# Patient Record
Sex: MALE | Race: ASIAN | NOT HISPANIC OR LATINO | Employment: FULL TIME | URBAN - METROPOLITAN AREA
[De-identification: names, ages, dates, MRNs, and addresses within clinical notes are randomized per-mention and may not be internally consistent; named-entity substitution may affect disease eponyms.]

---

## 2018-07-16 ENCOUNTER — CARE COORDINATION (OUTPATIENT)
Dept: FAMILY MEDICINE | Facility: CLINIC | Age: 33
End: 2018-07-16

## 2018-07-16 NOTE — PROGRESS NOTES
Date of discharge: 7/13/2018  Facility of discharge: [facility]  Patient concerns about condition: Concerns include that if he stands too long his back becomes increasingly painful.  Patient concerns about medications: No concerns at this time.  Full med reconciliation will be completed at clinic visit.  Patient concerns about transitioning: No concerns at this time.  Clinic office visit appointment date: 7/25/2018  Dr Tao  Patient reminded to bring all medications (prescription and over-the-counter) to clinic appointment: Yes   Pharmacy added to chart    Using the date of discharge as day 1, the 30th day post discharge is 8/12/2018.

## 2018-07-17 ENCOUNTER — COMMUNICATION - HEALTHEAST (OUTPATIENT)
Dept: FAMILY MEDICINE | Facility: CLINIC | Age: 33
End: 2018-07-17

## 2018-07-19 ENCOUNTER — OFFICE VISIT - HEALTHEAST (OUTPATIENT)
Dept: UROLOGY | Facility: CLINIC | Age: 33
End: 2018-07-19

## 2018-07-19 ENCOUNTER — AMBULATORY - HEALTHEAST (OUTPATIENT)
Dept: LAB | Facility: CLINIC | Age: 33
End: 2018-07-19

## 2018-07-19 DIAGNOSIS — N20.1 CALCULUS OF URETER: ICD-10-CM

## 2018-07-19 DIAGNOSIS — N20.9 URINARY TRACT STONES: ICD-10-CM

## 2018-07-19 DIAGNOSIS — N20.9 URINARY CALCULUS: ICD-10-CM

## 2018-07-19 LAB
ALBUMIN SERPL-MCNC: 4.1 G/DL (ref 3.5–5)
ALBUMIN UR-MCNC: NEGATIVE MG/DL
ANION GAP SERPL CALCULATED.3IONS-SCNC: 6 MMOL/L (ref 5–18)
APPEARANCE UR: CLEAR
BILIRUB UR QL STRIP: NEGATIVE
BUN SERPL-MCNC: 14 MG/DL (ref 8–22)
CALCIUM SERPL-MCNC: 9.5 MG/DL (ref 8.5–10.5)
CALCIUM SERPL-MCNC: 9.5 MG/DL (ref 8.5–10.5)
CALCIUM, IONIZED MEASURED: 1.19 MMOL/L (ref 1.11–1.3)
CHLORIDE BLD-SCNC: 107 MMOL/L (ref 98–107)
CO2 SERPL-SCNC: 28 MMOL/L (ref 22–31)
COLOR UR AUTO: YELLOW
CREAT SERPL-MCNC: 1.17 MG/DL (ref 0.7–1.3)
CREAT SERPL-MCNC: 1.21 MG/DL (ref 0.7–1.3)
GFR SERPL CREATININE-BSD FRML MDRD: >60 ML/MIN/1.73M2
GFR SERPL CREATININE-BSD FRML MDRD: >60 ML/MIN/1.73M2
GLUCOSE BLD-MCNC: 83 MG/DL (ref 70–125)
GLUCOSE UR STRIP-MCNC: ABNORMAL MG/DL
HGB UR QL STRIP: NEGATIVE
ION CA PH 7.4: 1.14 MMOL/L (ref 1.11–1.3)
KETONES UR STRIP-MCNC: NEGATIVE MG/DL
LEUKOCYTE ESTERASE UR QL STRIP: NEGATIVE
MAGNESIUM SERPL-MCNC: 2.7 MG/DL (ref 1.8–2.6)
NITRATE UR QL: NEGATIVE
PH UR STRIP: 7 [PH] (ref 5–8)
PH: 7.3 (ref 7.35–7.45)
PHOSPHATE SERPL-MCNC: 3.1 MG/DL (ref 2.5–4.5)
PHOSPHATE SERPL-MCNC: 3.2 MG/DL (ref 2.5–4.5)
POTASSIUM BLD-SCNC: 4.7 MMOL/L (ref 3.5–5)
PTH-INTACT SERPL-MCNC: 55 PG/ML (ref 10–86)
SODIUM SERPL-SCNC: 141 MMOL/L (ref 136–145)
SP GR UR STRIP: 1.01 (ref 1–1.03)
URATE SERPL-MCNC: 7.2 MG/DL (ref 3–8)
UROBILINOGEN UR STRIP-ACNC: ABNORMAL

## 2018-07-21 ENCOUNTER — AMBULATORY - HEALTHEAST (OUTPATIENT)
Dept: LAB | Facility: HOSPITAL | Age: 33
End: 2018-07-21

## 2018-07-21 DIAGNOSIS — N20.1 CALCULUS OF URETER: ICD-10-CM

## 2018-07-25 ENCOUNTER — OFFICE VISIT (OUTPATIENT)
Dept: FAMILY MEDICINE | Facility: CLINIC | Age: 33
End: 2018-07-25
Payer: COMMERCIAL

## 2018-07-25 VITALS
HEART RATE: 96 BPM | TEMPERATURE: 98.1 F | SYSTOLIC BLOOD PRESSURE: 126 MMHG | WEIGHT: 213 LBS | OXYGEN SATURATION: 97 % | HEIGHT: 69 IN | BODY MASS INDEX: 31.55 KG/M2 | DIASTOLIC BLOOD PRESSURE: 86 MMHG | RESPIRATION RATE: 16 BRPM

## 2018-07-25 DIAGNOSIS — N13.2 HYDRONEPHROSIS WITH URINARY OBSTRUCTION DUE TO URETERAL CALCULUS: ICD-10-CM

## 2018-07-25 DIAGNOSIS — N20.1 URETEROLITHIASIS: Primary | ICD-10-CM

## 2018-07-25 NOTE — MR AVS SNAPSHOT
After Visit Summary   2018    Deonna Honeycutt    MRN: [id number]           Patient Information     Date Of Birth          1985        Visit Information        Provider Department      2018 3:00 PM Kranthi Kirby MD [facility]        Today's Diagnoses     Ureterolithiasis    -  1    Hydronephrosis with urinary obstruction due to ureteral calculus           Follow-ups after your visit        Follow-up notes from your care team     Return in about 1 month (around 2018).      Your next 10 appointments already scheduled     Aug 24, 2018  2:20 PM CDT   PHYSICAL with Kranthi Kirby MD   [facility] ([facility])    [address] [place] [id number]   [phone]              Who to contact     Please call your clinic at [phone] to:    Ask questions about your health    Make or cancel appointments    Discuss your medicines    Learn about your test results    Speak to your doctor            Additional Information About Your Visit        MyChart Information     CloudArena is an electronic gateway that provides easy, online access to your medical records. With CloudArena, you can request a clinic appointment, read your test results, renew a prescription or communicate with your care team.     To sign up for Instamo[employer] visit the website at [email or web address]   You will be asked to enter the access code listed below, as well as some personal information. Please follow the directions to create your username and password.     Your access code is: [email or web address]  Expires: 10/29/2018  8:10 AM     Your access code will  in 90 days. If you need help or a new code, please contact your [facility] or call [phone] for assistance.        Care EveryWhere ID     This is your Care EveryWhere ID. This could be used by other organizations to access your [facility] medical records  [id number]        Your Vitals Were     Pulse  "Temperature Respirations Height Pulse Oximetry BMI (Body Mass Index)    96 98.1  F (36.7  C) (Oral) 16 5' 8.7\" (174.5 cm) 97% 31.73 kg/m2       Blood Pressure from Last 3 Encounters:   07/25/18 126/86    Weight from Last 3 Encounters:   07/25/18 213 lb (96.6 kg)              We Performed the Following      : Sign Language or Oral - 38-52 minutes        Primary Care Provider Office Phone #    Kranthi Kirby [phone]       [facility] [address] [place] [address]        Equal Access to Services     [facility]: Hadii aad ku hadasho Soomaali, waaxda luqadaha, qaybta kaalmada adeegyada, waxay idiin hayaan adeeg kharaberna laboyd . So [facility] [phone].    ATENCIÓN: Si habla español, tiene a starks disposición servicios gratuitos de asistencia lingüística. Llame al [phone].    We comply with applicable federal civil rights laws and [place] laws. We do not discriminate on the basis of race, color, national origin, age, disability, sex, sexual orientation, or gender identity.            Thank you!     Thank you for choosing [facility]  for your care. Our goal is always to provide you with excellent care. Hearing back from our patients is one way we can continue to improve our services. Please take a few minutes to complete the written survey that you may receive in the mail after your visit with us. Thank you!             Your Updated Medication List - Protect others around you: Learn how to safely use, store and throw away your medicines at www.disposemymeds.org.      Notice  As of 7/25/2018 11:59 PM    You have not been prescribed any medications.      "

## 2018-07-25 NOTE — NURSING NOTE
Due to patient being non-English speaking/uses sign language, an  was used for this visit. Only for face-to-face interpretation by an external agency, date and length of interpretation can be found on the scanned worksheet.     name: Hstyra  Agency: [employer]  Language: Bell   Telephone number: [phone]  Type of interpretation: Face-to-face, spoken

## 2018-07-25 NOTE — LETTER
RETURN TO WORK/SCHOOL FORM    7/25/2018    Re: Deonna Honeycutt  1985      To Whom It May Concern:     Deonna Honeycutt was seen in clinic today..  He may return to work without restrictions on 7/26/18          Restrictions:  None      Kranthi Kirby MD  7/25/2018 3:45 PM

## 2018-07-25 NOTE — PROGRESS NOTES
"      Hospitalization Follow-up Visit         Lists of hospitals in the [place]       Hospital Follow-up Visit:    Hospital:  [facility]   Date of Admission: 7/12/18  Date of Discharge: 7/13/18  Reason(s) for Admission: Nephrolithiasis, hydroureter            Problems taking medications regularly:  None       Post Discharge Medication Reconciliation: discharge medications reconciled and changed, per note/orders (see AVS).       Problems adhering to non-medication therapy:  None       Medications reviewed by: by myself. Findings include Still taking tamsulosin, instructed to stop because not having any pain.    Summary of hospitalization:  [facility] discharge summary reviewed  Diagnostic Tests/Treatments reviewed.  Follow up needed: none  Other Healthcare Providers Involved in Patient s Care:[facility]  Update since discharge: improved. No more pain   Plan of care communicated with patient            A [employer]  was used for this visit.            Review of Systems:   CONSTITUTIONAL: no fatigue, no unexpected change in weight  SKIN: no worrisome rashes, no worrisome moles, no worrisome lesions  EYES: no acute vision problems or changes  ENT: no ear problems, no mouth problems, no throat problems  RESP: no significant cough, no shortness of breath  CV: no chest pain, no palpitations, no new or worsening peripheral edema  GI: no nausea, no vomiting, no constipation, no diarrhea            Physical Exam:     Vitals:    07/25/18 1506   BP: 126/86   Pulse: 96   Resp: 16   Temp: 98.1  F (36.7  C)   TempSrc: Oral   SpO2: 97%   Weight: 213 lb (96.6 kg)   Height: 5' 8.7\" (174.5 cm)     Body mass index is 31.73 kg/(m^2).    GENERAL: healthy, alert, well nourished, well hydrated, no distress  HENT: ear canals- normal; TMs- normal; Nose- normal; Mouth- no ulcers, no lesions  NECK: no tenderness, no adenopathy, no asymmetry, no masses, no stiffness; thyroid- normal to palpation  RESP: lungs clear to auscultation - no rales, no rhonchi, " no wheezes  CV: regular rates and rhythm, normal S1 S2, no S3 or S4 and no murmur, no click or rub -  ABDOMEN: soft, no tenderness, no  hepatosplenomegaly, no masses, normal bowel sounds         Results:   Results from the last 24 hours No results found for this or any previous visit (from the past 24 hour(s)).    Assessment and Plan     (N20.1) Ureterolithiasis  (primary encounter diagnosis)  Comment: I believe the stone has passed based on patient's symptoms. Unable to find stone despite straining. But straining likely inconsistent. Patient has FU scheduled with [facility]. In the meantime DC flomax. Patient has PCP and has not had a physical so scheduled for 1 month.  Plan:  : Sign Language or Oral - 38-52         minutes    (N13.2) Hydronephrosis with urinary obstruction due to ureteral calculus  Comment: Mild.  Plan:  Repeat imaging per urologist    FU for yearly physical in 1 month    E&M code to be billed if TCM cannot be: 03318    Type of decision making: Moderate complexity (75735)    Options for treatment and follow-up care were reviewed with the patient  Lay D Dah   engaged in the decision making process and verbalized understanding of the options discussed and agreed with the final plan.      Kranthi Kirby MD

## 2018-07-25 NOTE — PROGRESS NOTES
Preceptor Attestation:   Patient seen, evaluated and discussed with the resident. I have verified the content of the note, which accurately reflects my assessment of the patient and the plan of care.  Supervising Physician:Augie Gallardo MD  [facility]

## 2018-07-26 LAB
CALCIUM 24H UR-MRATE: 119 MG/24HR (ref 25–300)
CHLORIDE 24H UR-SRATE: 193 MMOL/24HR (ref 110–250)
CITRATE 24H UR-MCNC: 284 MG/24HR (ref 221–1191)
CREATININE, 24 HR URINE - HISTORICAL: 1613.7 MG/24HR (ref 1000–2000)
MAGNESIUM 24H UR-MRATE: ABNORMAL MG/24 HR (ref 75–150)
OXALATE MG/SPEC: 48.3 MG/24HR (ref 7–44)
PH UR STRIP: 6 [PH] (ref 4.5–8)
PHOSPHORUS URINE MG/SPEC: 443 MG/24HR (ref 400–1300)
POTASSIUM 24H UR-SCNC: 41 MMOL/24HR (ref 30–90)
SODIUM 24H UR-SRATE: 176 MMOL/24HR (ref 40–217)
SPECIMEN VOL UR: 2475 ML
URIC ACID URINE MG/SPEC: 691 MG/24HR (ref 250–750)

## 2018-07-28 ENCOUNTER — AMBULATORY - HEALTHEAST (OUTPATIENT)
Dept: LAB | Facility: HOSPITAL | Age: 33
End: 2018-07-28

## 2018-07-28 DIAGNOSIS — N20.1 CALCULUS OF URETER: ICD-10-CM

## 2018-07-31 LAB
CALCIUM 24H UR-MRATE: 120 MG/24HR (ref 25–300)
CHLORIDE 24H UR-SRATE: 179 MMOL/24HR (ref 110–250)
CITRATE 24H UR-MCNC: 374 MG/24HR (ref 221–1191)
CREATININE, 24 HR URINE - HISTORICAL: 2290.6 MG/24HR (ref 1000–2000)
MAGNESIUM 24H UR-MRATE: 55 MG/24 HR (ref 75–150)
OXALATE MG/SPEC: 77 MG/24HR (ref 7–44)
PH UR STRIP: 6 [PH] (ref 4.5–8)
PHOSPHORUS URINE MG/SPEC: 1024.4 MG/24HR (ref 400–1300)
POTASSIUM 24H UR-SCNC: 59 MMOL/24HR (ref 30–90)
SODIUM 24H UR-SRATE: 161 MMOL/24HR (ref 40–217)
SPECIMEN VOL UR: 2600 ML
URIC ACID URINE MG/SPEC: 918 MG/24HR (ref 250–750)

## 2018-08-01 ENCOUNTER — TELEPHONE (OUTPATIENT)
Dept: FAMILY MEDICINE | Facility: CLINIC | Age: 33
End: 2018-08-01

## 2018-08-01 NOTE — TELEPHONE ENCOUNTER
TCM APPOINTMENT FOLLOW UP    Language:Other: Bell    Medication questions: yes:  Explain: Unable to speak with him, message left through language line    Specialist follow up: no    Concerns since last visit: no    Next appointment at [facility]:No    Comments: Left messaged educating and to call 24 hr line if needed    @[facility]@ yes   Amada Avendano

## 2018-08-09 ENCOUNTER — OFFICE VISIT - HEALTHEAST (OUTPATIENT)
Dept: UROLOGY | Facility: CLINIC | Age: 33
End: 2018-08-09

## 2018-08-09 DIAGNOSIS — N20.9 URINARY TRACT STONES: ICD-10-CM

## 2018-08-09 DIAGNOSIS — R82.992 HYPEROXALURIA: ICD-10-CM

## 2018-08-09 LAB
ALBUMIN UR-MCNC: NEGATIVE MG/DL
APPEARANCE UR: CLEAR
BILIRUB UR QL STRIP: NEGATIVE
COLOR UR AUTO: YELLOW
GLUCOSE UR STRIP-MCNC: NEGATIVE MG/DL
HGB UR QL STRIP: NEGATIVE
KETONES UR STRIP-MCNC: NEGATIVE MG/DL
LEUKOCYTE ESTERASE UR QL STRIP: NEGATIVE
NITRATE UR QL: NEGATIVE
PH UR STRIP: 5.5 [PH] (ref 5–8)
SP GR UR STRIP: 1.02 (ref 1–1.03)
UROBILINOGEN UR STRIP-ACNC: NORMAL

## 2018-08-14 ENCOUNTER — HOSPITAL ENCOUNTER (OUTPATIENT)
Dept: CT IMAGING | Facility: CLINIC | Age: 33
Discharge: HOME | End: 2018-08-14
Attending: PHYSICIAN ASSISTANT

## 2018-08-14 ENCOUNTER — OFFICE VISIT - HEALTHEAST (OUTPATIENT)
Dept: UROLOGY | Facility: CLINIC | Age: 33
End: 2018-08-14

## 2018-08-14 ENCOUNTER — AMBULATORY - HEALTHEAST (OUTPATIENT)
Dept: UROLOGY | Facility: CLINIC | Age: 33
End: 2018-08-14

## 2018-08-14 DIAGNOSIS — R10.9 ACUTE LEFT FLANK PAIN: ICD-10-CM

## 2018-08-14 DIAGNOSIS — R10.9 LEFT FLANK PAIN: ICD-10-CM

## 2018-08-14 DIAGNOSIS — N20.9 URINARY TRACT STONES: ICD-10-CM

## 2018-08-14 DIAGNOSIS — N20.1 CALCULUS OF URETER: ICD-10-CM

## 2018-08-14 LAB
ALBUMIN UR-MCNC: NEGATIVE MG/DL
APPEARANCE UR: CLEAR
BILIRUB UR QL STRIP: NEGATIVE
COLOR UR AUTO: YELLOW
GLUCOSE UR STRIP-MCNC: NEGATIVE MG/DL
HGB UR QL STRIP: NEGATIVE
KETONES UR STRIP-MCNC: NEGATIVE MG/DL
LEUKOCYTE ESTERASE UR QL STRIP: NEGATIVE
NITRATE UR QL: NEGATIVE
PH UR STRIP: 6 [PH] (ref 5–8)
SP GR UR STRIP: 1.01 (ref 1–1.03)
UROBILINOGEN UR STRIP-ACNC: NORMAL

## 2018-08-24 ENCOUNTER — OFFICE VISIT (OUTPATIENT)
Dept: FAMILY MEDICINE | Facility: CLINIC | Age: 33
End: 2018-08-24
Payer: COMMERCIAL

## 2018-08-24 VITALS
HEIGHT: 69 IN | OXYGEN SATURATION: 96 % | TEMPERATURE: 97.8 F | WEIGHT: 215 LBS | SYSTOLIC BLOOD PRESSURE: 124 MMHG | RESPIRATION RATE: 16 BRPM | DIASTOLIC BLOOD PRESSURE: 80 MMHG | BODY MASS INDEX: 31.84 KG/M2 | HEART RATE: 76 BPM

## 2018-08-24 DIAGNOSIS — Z00.00 ROUTINE GENERAL MEDICAL EXAMINATION AT A HEALTH CARE FACILITY: Primary | ICD-10-CM

## 2018-08-24 LAB — HIV 1+2 AB+HIV1 P24 AG SERPL QL IA: NEGATIVE

## 2018-08-24 NOTE — LETTER
August 27, 2018      Deonna Honeycutt  [address]  [place] [address]        Dear Deonna,    Your results from your recent clinic visit show that your HIV test is negative.     Please see below for your test results.    Resulted Orders   HIV Ag/Ab Screen [place] ([facility])   Result Value Ref Range    HIV Antigen/Antibody Negative Negative    Narrative    Test performed by:  [facility]  [address] [place] [address]  Method is Abbott HIV Ag/Ab for the detection of HIV p24 antigen, HIV-1   antibodies and HIV-2 antibodies.       If you have any questions, please call the clinic to make an appointment.    Sincerely,    Kranthi Kirby MD

## 2018-08-24 NOTE — MR AVS SNAPSHOT
After Visit Summary   8/24/2018    Deonna Honeycutt    MRN: [id number]           Patient Information     Date Of Birth          1985        Visit Information        Provider Department      8/24/2018 2:20 PM Kranthi Kirby MD [facility]        Today's Diagnoses     Routine general medical examination at a health care facility    -  1      Care Instructions      Preventive Health Recommendations  Male Ages 26 - 39    Yearly exam:             See your health care provider every year in order to  o   Review health changes.   o   Discuss preventive care.    o   Review your medicines if your doctor has prescribed any.    You should be tested each year for STDs (sexually transmitted diseases), if you re at risk.     After age 35, talk to your provider about cholesterol testing. If you are at risk for heart disease, have your cholesterol tested at least every 5 years.     If you are at risk for diabetes, you should have a diabetes test (fasting glucose).  Shots: Get a flu shot each year. Get a tetanus shot every 10 years.     Nutrition:    Eat at least 5 servings of fruits and vegetables daily.     Eat whole-grain bread, whole-wheat pasta and brown rice instead of white grains and rice.     Get adequate Calcium and Vitamin D.     Lifestyle    Exercise for at least 150 minutes a week (30 minutes a day, 5 days a week). This will help you control your weight and prevent disease.     Limit alcohol to one drink per day.     No smoking.     Wear sunscreen to prevent skin cancer.     See your dentist every six months for an exam and cleaning.             Follow-ups after your visit        Follow-up notes from your care team     Return in about 1 year (around 8/24/2019).      Who to contact     Please call your clinic at [phone] to:    Ask questions about your health    Make or cancel appointments    Discuss your medicines    Learn about your test results    Speak to your doctor            Additional  "Information About Your Visit        Servo Software Information     [employer] is an electronic gateway that provides easy, online access to your medical records. With [employer], you can request a clinic appointment, read your test results, renew a prescription or communicate with your care team.     To sign up for [employer] Software visit the website at [email or web address]   You will be asked to enter the access code listed below, as well as some personal information. Please follow the directions to create your username and password.     Your access code is: [email or web address]  Expires: 10/29/2018  8:10 AM     Your access code will  in 90 days. If you need help or a new code, please contact your [facility] or call [phone] for assistance.        Care EveryWhere ID     This is your Care EveryWhere ID. This could be used by other organizations to access your [facility] medical records  [id number]        Your Vitals Were     Pulse Temperature Respirations Height Pulse Oximetry BMI (Body Mass Index)    76 97.8  F (36.6  C) (Oral) 16 5' 9\" (175.3 cm) 96% 31.75 kg/m2       Blood Pressure from Last 3 Encounters:   18 124/80   18 126/86    Weight from Last 3 Encounters:   18 215 lb (97.5 kg)   18 213 lb (96.6 kg)              We Performed the Following     HIV Ag/Ab Screen [place] ([facility])      : Sign Language or Oral - 23-37 minutes       Information about OPIOIDS     PRESCRIPTION OPIOIDS: WHAT YOU NEED TO KNOW   We gave you an opioid (narcotic) pain medicine. It is important to manage your pain, but opioids are not always the best choice. You should first try all the other options your care team gave you. Take this medicine for as short a time (and as few doses) as possible.    Some activities can increase your pain, such as bandage changes or therapy sessions. It may help to take your pain medicine 30 to 60 minutes before these activities. Reduce your stress by " getting enough sleep, working on hobbies you enjoy and practicing relaxation or meditation. Talk to your care team about ways to manage your pain beyond prescription opioids.    These medicines have risks:    DO NOT drive when on new or higher doses of pain medicine. These medicines can affect your alertness and reaction times, and you could be arrested for driving under the influence (DUI). If you need to use opioids long-term, talk to your care team about driving.    DO NOT operate heavy machinery    DO NOT do any other dangerous activities while taking these medicines.    DO NOT drink any alcohol while taking these medicines.     If the opioid prescribed includes acetaminophen, DO NOT take with any other medicines that contain acetaminophen. Read all labels carefully. Look for the word  acetaminophen  or  Tylenol.  Ask your pharmacist if you have questions or are unsure.    You can get addicted to pain medicines, especially if you have a history of addiction (chemical, alcohol or substance dependence). Talk to your care team about ways to reduce this risk.    All opioids tend to cause constipation. Drink plenty of water and eat foods that have a lot of fiber, such as fruits, vegetables, prune juice, apple juice and high-fiber cereal. Take a laxative (Miralax, milk of magnesia, Colace, Senna) if you don t move your bowels at least every other day. Other side effects include upset stomach, sleepiness, dizziness, throwing up, tolerance (needing more of the medicine to have the same effect), physical dependence and slowed breathing.    Store your pills in a secure place, locked if possible. We will not replace any lost or stolen medicine. If you don t finish your medicine, please throw away (dispose) as directed by your pharmacist. The [place] Pollution Control Agency has more information about safe disposal: [email or web address]/living-[email or web address]-unwanted-medications         Primary Care Provider Office  Phone #    Kranthi Kirby [phone]       [facility] [address] [place] [address]        Equal Access to Services     GLENN ANDRE : Hadii gregory pacheco jhonyo Somyronali, waaxda luqadaha, qaybta kaalmada adelibia, linden betancourt laolenatoney wyatt. So [facility] [phone].    ATENCIÓN: Si habla español, tiene a starks disposición servicios gratuitos de asistencia lingüística. Llame al [phone].    We comply with applicable federal civil rights laws and [place] laws. We do not discriminate on the basis of race, color, national origin, age, disability, sex, sexual orientation, or gender identity.            Thank you!     Thank you for choosing [facility]  for your care. Our goal is always to provide you with excellent care. Hearing back from our patients is one way we can continue to improve our services. Please take a few minutes to complete the written survey that you may receive in the mail after your visit with us. Thank you!             Your Updated Medication List - Protect others around you: Learn how to safely use, store and throw away your medicines at www.disposemymeds.org.          This list is accurate as of 8/24/18 11:59 PM.  Always use your most recent med list.                   Brand Name Dispense Instructions for use Diagnosis    OXYCODONE HCL PO      Take 5 mg by mouth every 4 hours as needed    Routine general medical examination at a health care facility       TAMSULOSIN HCL PO      Take 0.4 mg by mouth One capsule po for 14 days    Routine general medical examination at a health care facility

## 2018-08-24 NOTE — NURSING NOTE
Due to patient being non-English speaking/uses sign language, an  was used for this visit. Only for face-to-face interpretation by an external agency, date and length of interpretation can be found on the scanned worksheet.     name: Yelitza Lancaster  Agency: [employer]  Language: Bell   Telephone number: [phone]  Type of interpretation: Face-to-face, spoken

## 2018-08-24 NOTE — PROGRESS NOTES
Male Physical Note      Concerns today:  - Last seen 7/25/18 for Follow-up from ER visit for nephrolithiasis, no longer was symptomatic so felt likely that kidney stone had passes  - Saw nephrology 8/14 afer having one day of left flak pain similar to previous and CT scan showed persistent 3 mm UVJ calculus with resolution of previous hydronephrosis.  - Plan was to pursue 3-4 weeks of medical expulsive therapy and if stone persistent that proceed with surgical intervention.   - Has an appointment with nephrology on on 9/20    A [employer]  was used for this visit.    ROS:                      CONSTITUTIONAL: no fatigue, no unexpected change in weight  SKIN: no worrisome rashes, no worrisome moles, no worrisome lesions  EYES: no acute vision problems or changes  ENT: no ear problems, no mouth problems, no throat problems  RESP: no significant cough, no shortness of breath  CV: no chest pain, no palpitations, no new or worsening peripheral edema  GI: no nausea, no vomiting, no constipation, no diarrhea, Some inguinal pain    Past Medical History:   Diagnosis Date     Ureterolithiasis 7/12/2018        Family History   Problem Relation Age of Onset     Nephrolithiasis Mother      Reviewed no other significant FH           Family History and past Medical History reviewed and unchanged/updated.    Social History   Substance Use Topics     Smoking status: Never Smoker     Smokeless tobacco: Never Used     Alcohol use No       Children- 2    Has anyone hurt you physically, for example by pushing, hitting, slapping or kicking you or forcing you to have sex? Denies  Do you feel threatened or controlled by a partner, ex-partner or anyone in your life? Denies    RISK BEHAVIORS AND HEALTHY HABITS:  Tobacco Use/Smoking: No  Illicit Drug Use: None  ETOH: None  Do you use alcohol? No  Sexually Active: Yes  Diet (5-7 servings of fruits/veg daily): 1-2  Exercise (30 min accumulated most days):No  Dental Care: Yes  "  Calcium 1500 mg/d:  No  Seat Belt Use: Yes     HIV screening:  Due will draw today      There is no immunization history on file for this patient.  Reviewed Immunization Record Today    EXAMINATION:  /80  Pulse 76  Temp 97.8  F (36.6  C) (Oral)  Resp 16  Ht 5' 9\" (175.3 cm)  Wt 215 lb (97.5 kg)  SpO2 96%  BMI 31.75 kg/m2  GENERAL: healthy, alert and no distress  EYES: Eyes grossly normal to inspection, extraocular movements - intact, and PERRL  HENT: ear canals- normal; TMs- normal; Nose- normal; Mouth- no ulcers, no lesions  NECK: no tenderness, no adenopathy, no asymmetry, no masses, no stiffness; thyroid- normal to palpation  RESP: lungs clear to auscultation - no rales, no rhonchi, no wheezes  CV: regular rates and rhythm, normal S1 S2, no S3 or S4 and no murmur, no click or rub -  ABDOMEN: soft, no tenderness, no  hepatosplenomegaly, no masses, normal bowel sounds, normal flank tenderness  MS: extremities- no gross deformities noted, no edema  SKIN: no suspicious lesions, no rashes  NEURO: strength and tone- normal, sensory exam- grossly normal, mentation- intact, speech- normal, reflexes- symmetric  BACK: no CVA tenderness, no paralumbar tenderness  PSYCH: Alert and oriented times 3; speech- coherent , normal rate and volume; able to articulate logical thoughts, affect- normal    ASSESSMENT:  1. Health Care Maintenance: Normal Physical Exam    PLAN:  1. HIV screening normal  2. Routine follow up in one year.    Dr. Kranthi Kirby PGY2    Patient seen and staffed with Dr. Weller  "

## 2018-08-27 NOTE — PROGRESS NOTES
Jeaneth or Shirley,  Please call the patient and give them my message below,  Lay  Your results from your recent clinic visit show that your HIV test is negative.    If you have more questions please let my PCS and tell her a number I can reach you at and I will call you back.    Dr. Kranthi Kirby

## 2018-09-04 NOTE — PROGRESS NOTES
I have personally reviewed the history and examination as documented by Dr. Kirby.  I was present during key portions of the visit and agree with the assessment and plan as documented for 32 yr old male with nephrolithiasis here for follow-up. Precautions given. Anticipatory guidance given.     Jono Weller MD  September 4, 2018  2:05 PM

## 2018-09-20 ENCOUNTER — HOSPITAL ENCOUNTER (OUTPATIENT)
Dept: CT IMAGING | Facility: CLINIC | Age: 33
Discharge: HOME | End: 2018-09-20
Attending: PHYSICIAN ASSISTANT

## 2018-09-20 ENCOUNTER — OFFICE VISIT - HEALTHEAST (OUTPATIENT)
Dept: UROLOGY | Facility: CLINIC | Age: 33
End: 2018-09-20

## 2018-09-20 DIAGNOSIS — N20.1 CALCULUS OF URETER: ICD-10-CM

## 2018-09-20 DIAGNOSIS — N20.9 URINARY TRACT STONES: ICD-10-CM

## 2018-09-20 LAB
ALBUMIN UR-MCNC: NEGATIVE MG/DL
APPEARANCE UR: CLEAR
BILIRUB UR QL STRIP: NEGATIVE
COLOR UR AUTO: YELLOW
GLUCOSE UR STRIP-MCNC: NEGATIVE MG/DL
HGB UR QL STRIP: ABNORMAL
KETONES UR STRIP-MCNC: NEGATIVE MG/DL
LEUKOCYTE ESTERASE UR QL STRIP: NEGATIVE
NITRATE UR QL: NEGATIVE
PH UR STRIP: 7 [PH] (ref 5–8)
SP GR UR STRIP: 1.02 (ref 1–1.03)
UROBILINOGEN UR STRIP-ACNC: ABNORMAL

## 2018-09-24 ENCOUNTER — ANESTHESIA - HEALTHEAST (OUTPATIENT)
Dept: SURGERY | Facility: CLINIC | Age: 33
End: 2018-09-24

## 2018-09-25 ENCOUNTER — SURGERY - HEALTHEAST (OUTPATIENT)
Dept: SURGERY | Facility: CLINIC | Age: 33
End: 2018-09-25

## 2018-09-25 ASSESSMENT — MIFFLIN-ST. JEOR: SCORE: 1947.8

## 2018-10-08 ENCOUNTER — AMBULATORY - HEALTHEAST (OUTPATIENT)
Dept: UROLOGY | Facility: CLINIC | Age: 33
End: 2018-10-08

## 2018-10-08 DIAGNOSIS — N20.1 CALCULUS OF URETER: ICD-10-CM

## 2018-10-08 DIAGNOSIS — N20.9 URINARY TRACT STONES: ICD-10-CM

## 2018-10-08 LAB
ALBUMIN UR-MCNC: ABNORMAL MG/DL
APPEARANCE UR: CLEAR
BILIRUB UR QL STRIP: NEGATIVE
COLOR UR AUTO: YELLOW
GLUCOSE UR STRIP-MCNC: NEGATIVE MG/DL
HGB UR QL STRIP: ABNORMAL
KETONES UR STRIP-MCNC: NEGATIVE MG/DL
LEUKOCYTE ESTERASE UR QL STRIP: ABNORMAL
NITRATE UR QL: NEGATIVE
PH UR STRIP: 6.5 [PH] (ref 5–8)
SP GR UR STRIP: 1.01 (ref 1–1.03)
UROBILINOGEN UR STRIP-ACNC: ABNORMAL

## 2018-10-09 LAB — BACTERIA SPEC CULT: NO GROWTH

## 2018-11-05 ENCOUNTER — COMMUNICATION - HEALTHEAST (OUTPATIENT)
Dept: UROLOGY | Facility: CLINIC | Age: 33
End: 2018-11-05

## 2018-11-15 ENCOUNTER — HOSPITAL ENCOUNTER (OUTPATIENT)
Dept: CT IMAGING | Facility: CLINIC | Age: 33
Discharge: HOME | End: 2018-11-15
Attending: UROLOGY

## 2018-11-15 ENCOUNTER — OFFICE VISIT - HEALTHEAST (OUTPATIENT)
Dept: UROLOGY | Facility: CLINIC | Age: 33
End: 2018-11-15

## 2018-11-15 DIAGNOSIS — N20.0 CALCULUS OF KIDNEY: ICD-10-CM

## 2018-11-15 DIAGNOSIS — R82.992 HYPEROXALURIA: ICD-10-CM

## 2018-11-15 DIAGNOSIS — N20.1 CALCULUS OF URETER: ICD-10-CM

## 2018-11-15 LAB
ALBUMIN UR-MCNC: NEGATIVE MG/DL
APPEARANCE UR: CLEAR
BILIRUB UR QL STRIP: NEGATIVE
COLOR UR AUTO: YELLOW
GLUCOSE UR STRIP-MCNC: NEGATIVE MG/DL
HGB UR QL STRIP: ABNORMAL
KETONES UR STRIP-MCNC: NEGATIVE MG/DL
LEUKOCYTE ESTERASE UR QL STRIP: NEGATIVE
NITRATE UR QL: NEGATIVE
PH UR STRIP: 6 [PH] (ref 5–8)
SP GR UR STRIP: 1.01 (ref 1–1.03)
UROBILINOGEN UR STRIP-ACNC: ABNORMAL

## 2019-10-28 ENCOUNTER — HOSPITAL ENCOUNTER (OUTPATIENT)
Dept: CT IMAGING | Facility: CLINIC | Age: 34
Discharge: HOME | End: 2019-10-28
Attending: PHYSICIAN ASSISTANT

## 2019-10-28 ENCOUNTER — OFFICE VISIT - HEALTHEAST (OUTPATIENT)
Dept: UROLOGY | Facility: CLINIC | Age: 34
End: 2019-10-28

## 2019-10-28 DIAGNOSIS — N20.1 CALCULUS OF URETER: ICD-10-CM

## 2019-10-28 DIAGNOSIS — Z87.442 HISTORY OF KIDNEY STONES: ICD-10-CM

## 2019-10-28 LAB
ALBUMIN UR-MCNC: NEGATIVE MG/DL
APPEARANCE UR: CLEAR
BILIRUB UR QL STRIP: NEGATIVE
COLOR UR AUTO: YELLOW
GLUCOSE UR STRIP-MCNC: ABNORMAL MG/DL
HGB UR QL STRIP: NEGATIVE
KETONES UR STRIP-MCNC: NEGATIVE MG/DL
LEUKOCYTE ESTERASE UR QL STRIP: NEGATIVE
NITRATE UR QL: NEGATIVE
PH UR STRIP: 6 [PH] (ref 5–8)
SP GR UR STRIP: 1.01 (ref 1–1.03)
UROBILINOGEN UR STRIP-ACNC: ABNORMAL

## 2021-05-15 ENCOUNTER — IMMUNIZATION (OUTPATIENT)
Dept: FAMILY MEDICINE | Facility: CLINIC | Age: 36
End: 2021-05-15
Payer: COMMERCIAL

## 2021-05-15 PROCEDURE — 91300 PR COVID VAC PFIZER DIL RECON 30 MCG/0.3 ML IM: CPT

## 2021-05-15 PROCEDURE — 0001A PR COVID VAC PFIZER DIL RECON 30 MCG/0.3 ML IM: CPT

## 2021-06-01 VITALS — WEIGHT: 210 LBS | BODY MASS INDEX: 28.48 KG/M2

## 2021-06-02 VITALS — HEIGHT: 72 IN | WEIGHT: 212.7 LBS | BODY MASS INDEX: 28.81 KG/M2

## 2021-06-02 NOTE — PATIENT INSTRUCTIONS - HE
Patient Stated Goal: Prevent further stones  Calcium Oxalate Stone Prevention Self Management    Drink more fluids:    Drinking more liquids is the best way you can help prevent future stones. Stones can form when substances in the urine are too concentrated. The more you drink, the more urine you will make. This means all substances in the urine will be less concentrated.    How much urine should I be producing?    The usual recommended daily urine production is about 2 to 3 quarts (0139-5142 ml). If you are producing more than 3 quarts of urine on a regular basis, it is possible to deplete important minerals stored in the body.    To measure the amount of urine you produce in a day, you can either:    Collect all urine in a container and measure at the end of the day     Use a measuring cup each time you urinate and add up the amounts at the end of the day     Observe    Color - Dark hang urine is concentrated. Light straw color or lighter is dilute and desirable     Odor - Concentrated urine tends to smell stronger. Dilute urine is nearly odorless    Ways to increase your fluid intake    Increasing the amount of fluid you drink is effective for all types of kidney stones. While water is commonly recommended, all fluids are effective for increasing the amount of urine your body produces.    Focus on starting a lifelong habit, rather than a short-term solution.     Keep liquids on hand that you like. Crystal Light is a low calorie appropriate choice.    Drink out of larger glasses. You'll tend to drink more with each serving.     Have an additional glass of fluid with each meal.     Keep a water or drink bottle at work and fill it regularly.     *If you are prone to fluid retention, consult your doctor before making changes to your fluid habits.    Low Oxalate Diet:    Avoid excess amounts or daily consumption of these foods:    All nuts and nut products including peanuts, almonds, pecans, peanut butter, almond  milk    Rhubarb    Chocolate    Soybeans and soy products     Spinach    Wheat Germ    Beets    Maintain a normal calcium diet:    Researches have found that people with low calcium intakes tend to have more stones. Foods with high calcium content are acceptable and include:    Dairy products (including milk, cheese and yogurt)    Meat and fish    Enriched cereals    Dark green vegetables    What about calcium supplements?     Many people take calcium supplements, either on their own or as prescribed by a doctor. Research has indicated that calcium supplements do not usually pose a risk for stone formation.  Calcium citrate is a better choice for a supplement.    Avoid excess salt:    Salt (sodium chloride) is found in abundance in many foods. High sodium levels in the urine can interfere with the kidney's handling of calcium.     Tips for reducing the salt in your diet:    Don't use salt at the table    Reduce the salt used in food preparation. Try 1/2 teaspoon when recipes call for 1 teaspoon.    Use herbs and spices for flavoring instead of salt.    Avoid salty foods.    Check the label before you buy or use a product. Note sodium and portion size information.    Try to consume less than 2,000 mg/day. (1 teaspoon = 2,000 mg)    Foods with high sodium content include:    Processed meat (including luncheon meats, sausage)     Crackers     Instant cereal     Processed cheese     Canned soups     Chips and snack foods     Soy sauce    [facility] can respond to your questions or concerns 24 hours a day at [phone].

## 2021-06-03 VITALS
WEIGHT: 212 LBS | SYSTOLIC BLOOD PRESSURE: 127 MMHG | DIASTOLIC BLOOD PRESSURE: 81 MMHG | TEMPERATURE: 97.8 F | HEART RATE: 72 BPM | BODY MASS INDEX: 28.75 KG/M2

## 2021-06-03 NOTE — PROGRESS NOTES
Assessment/Plan:        Diagnoses and all orders for this visit:    Calculus of ureter  -     Patient Stated Goal: Prevent further stones  -     Calcium Oxalate Stone Prevention Education  -     Urinalysis Macroscopic    History of kidney stones      Stone Management Plan  [facility] Stone Management 10/8/2018 11/15/2018 10/28/2019   Urinary Tract Infection No suspicion of infection No suspicion of infection No suspicion of infection   Renal Colic Well controlled symptoms Asymptomatic at this time Well controlled symptoms   Renal Failure No suspicion of renal failure No suspicion of renal failure No suspicion of renal failure   Current CT date - 11/15/2018 10/28/2019   Right sided stones? - Yes No   R Stone Event No current event No current event No current event   Left sided stones? - No No   L Number of ureteral stones - - -   L GSD of ureteral stones - - -   L Location of ureteral stone - - -   L Number of kidney stones  - - -   L GSD of kidney stones - - -   L Hydronephrosis - - -   L Stone Event Established event Resolved event No current event   Diagnosis date - - -   Initial location of primary symptomatic stone - - -   Initial GSD of primary symptomatic stone - - -   Resolved date - 11/15/2018 -   Post-op status Stent Removal No residual stone -   L MET Status - - -   L Current Plan - - -   MET - - -   Clear rationale - - -             Subjective:      HPI  Mr. Deonna Honeycutt is a 33 y.o. Bell male returning to the [facility] [place] for follow up of his stone disease.    He returns for annual follow up. Slight occasional left sided discomfort. Seems like MSK.     CT from today is personally reviewed and demonstrates no hydronephrosis.     Excellent progress status post distal ureteral incision for severely impacted stone.    Follow up PRN     ROS   Review of systems is negative except for HPI.    Past Medical History:   Diagnosis Date     Kidney stone 07/2018    first stone at age 32       Past Surgical  History:   Procedure Laterality Date     none       AK CYSTOSCOPY,INSERT URETERAL STENT Left 9/25/2018    Procedure: CYSTOSCOPY LEFT URETEROSCOPY STENT INSERTION, INCISION OF URETER;  Surgeon: Corky Hercules MD;  Location: [facility];  Service: Urology       No current outpatient medications on file.     No current facility-administered medications for this visit.        No Known Allergies    Social History     Socioeconomic History     Marital status:      Spouse name: Not on file     Number of children: Not on file     Years of education: Not on file     Highest education level: Not on file   Occupational History     Occupation: essembly   Social Needs     Financial resource strain: Not on file     Food insecurity:     Worry: Not on file     Inability: Not on file     Transportation needs:     Medical: Not on file     Non-medical: Not on file   Tobacco Use     Smoking status: Never Smoker     Smokeless tobacco: Never Used   Substance and Sexual Activity     Alcohol use: Yes     Comment: social     Drug use: No     Sexual activity: Not on file   Lifestyle     Physical activity:     Days per week: Not on file     Minutes per session: Not on file     Stress: Not on file   Relationships     Social connections:     Talks on phone: Not on file     Gets together: Not on file     Attends L[employer]-day service: Not on file     Active member of club or organization: Not on file     Attends meetings of clubs or organizations: Not on file     Relationship status: Not on file     Intimate partner violence:     Fear of current or ex partner: Not on file     Emotionally abused: Not on file     Physically abused: Not on file     Forced sexual activity: Not on file   Other Topics Concern     Not on file   Social History Narrative     Not on file       Family History   Problem Relation Age of Onset     Urolithiasis Mother      Urolithiasis Father      Objective:      Physical Exam  Vitals:    10/28/19 1135   BP:  127/81   Pulse: 72   Temp: 97.8  F (36.6  C)     General - well developed, well nourished, appropriate for age. Appears no distress at this time  Abdomen - mildly obese soft, non-tender, no hepatosplenomegaly, no masses.   - no flank tenderness, no suprapubic tenderness, kidney and bladder non-palpable  MSK - normal spinal curvature. no spinal tenderness. normal gait. muscular strength intact.  Psych - oriented to time, place, and person, normal mood and affect.      Labs   Urinalysis POC (Office):  Nitrite, UA   Date Value Ref Range Status   10/28/2019 Negative Negative Final   11/15/2018 Negative Negative Final   10/08/2018 Negative Negative Final       Lab Urinalysis:  Blood, UA   Date Value Ref Range Status   10/28/2019 Negative Negative Final   11/15/2018 Trace (!) Negative Final   10/08/2018 Large (!) Negative Final     Nitrite, UA   Date Value Ref Range Status   10/28/2019 Negative Negative Final   11/15/2018 Negative Negative Final   10/08/2018 Negative Negative Final     Leukocytes, UA   Date Value Ref Range Status   10/28/2019 Negative Negative Final   11/15/2018 Negative Negative Final   10/08/2018 Moderate (!) Negative Final     pH, UA   Date Value Ref Range Status   10/28/2019 6.0 5.0 - 8.0 Final   11/15/2018 6.0 5.0 - 8.0 Final   10/08/2018 6.5 5.0 - 8.0 Final

## 2021-06-05 ENCOUNTER — IMMUNIZATION (OUTPATIENT)
Dept: FAMILY MEDICINE | Facility: CLINIC | Age: 36
End: 2021-06-05
Payer: COMMERCIAL

## 2021-06-05 PROCEDURE — 0002A PR COVID VAC PFIZER DIL RECON 30 MCG/0.3 ML IM: CPT

## 2021-06-05 PROCEDURE — 91300 PR COVID VAC PFIZER DIL RECON 30 MCG/0.3 ML IM: CPT

## 2021-06-19 NOTE — PROGRESS NOTES
Assessment/Plan:        Diagnoses and all orders for this visit:    Calculus of ureter  -     Renal Function Profile; Future; Expected date: 7/19/18  -     Magnesium; Future; Expected date: 7/19/18  -     Uric Acid; Future; Expected date: 7/19/18  -     Calcium, Ionized, Measured; Future; Expected date: 7/19/18  -     Parathyroid Hormone Intact with Minerals; Future; Expected date: 7/19/18  -     Stone Formation, 24 Hour Urine x2; Standing  -     Patient Stated Goal: Prevent further stones  -     24 Hour Urine Collection Steps Education    Urinary tract stones  -     Urinalysis Macroscopic    Urinary calculus      Stone Management Plan  [facility] Stone Management 7/19/2018   Urinary Tract Infection No suspicion of infection   Renal Colic Asymptomatic at this time   Renal Failure No suspicion of renal failure   Current CT date 7/12/2018   Right sided stones? No   R Stone Event No current event   Left sided stones? Yes   L Number of ureteral stones 2   L GSD of ureteral stones 3   L Location of ureteral stone Distal   L Number of kidney stones  No renal stones   L GSD of kidney stones N/A   L Hydronephrosis Mild   L Stone Event New event   Diagnosis date 7/12/2018   Initial location of primary symptomatic stone Distal   Initial GSD of primary symptomatic stone 3   L MET Status Initiation   L Current Plan MET             Subjective:      HPI  Mr. Deonna Honeycutt is a 32 y.o. Bell male presenting to the [facility] [place] for a new problem.    He is a first time unidentified composition stone former who has not required stone clearance procedures. He has not previously participated in stone risk evaluation. He has no identified modifiable stone risk factors. He has identified non-modifiable stone risks including:  extensive family history.    Presented to ED with severe, sudden onset left flank pain accompanied by vomiting. No fever or chills. No dysuria. He has not caught the stone. He has been pain free since  presentation. Both of his parents have had stones.    CT scan is personally reviewed and demonstrates a 5 mm left extreme distal stone and mild hydronephrosis.     Significant labs from presentation include slightly elevated creatinine and summarized below.    PLAN    I suspect that he has already passed the stone. Will follow up with stone risk evaluation and gabriel serum chemistries with [facility] today with orders to collect two 24 hour urine collection.       ROS   Review of Systems  A 12 point comprehensive review of systems is negative except for HPI    Past Medical History:   Diagnosis Date     Kidney stone 07/2018    first stone at age 32       No past surgical history on file.    Current Outpatient Prescriptions   Medication Sig Dispense Refill     tamsulosin (FLOMAX) 0.4 mg Cp24 Take 1 capsule (0.4 mg total) by mouth daily after supper. 20 capsule 0     No current facility-administered medications for this visit.        No Known Allergies    Social History     Social History     Marital status:      Spouse name: N/A     Number of children: N/A     Years of education: N/A     Occupational History     essembly      Social History Main Topics     Smoking status: Never Smoker     Smokeless tobacco: Never Used     Alcohol use Yes      Comment: social     Drug use: No     Sexual activity: Not on file     Other Topics Concern     Not on file     Social History Narrative     No narrative on file       Family History   Problem Relation Age of Onset     Urolithiasis Mother      Urolithiasis Father        Objective:      Physical Exam  Vitals:    07/19/18 1036   BP: 141/79   Pulse: (!) 101   Temp: 98.4  F (36.9  C)     General - well developed, well nourished, appropriate for age. Appears no distress at this time   Heart - regular rate and rhythm, no murmur  Respiratory - normal effort, clear to auscultation, good air entry without adventitious noises  Abdomen - mildly obese soft, non-tender, no hepatosplenomegaly, no  masses.   - no flank tenderness, no suprapubic tenderness, kidney and bladder non-palpable  MSK - normal spinal curvature. no spinal tenderness. normal gait. muscular strength intact.  Neurology - cranial nerves II-XII grossly intact, normal sensation, no unsteadiness  Skin - intact, no bruising, no gouty tophi  Psych - oriented to time, place, and person, normal mood and affect.      Labs  Urinalysis POC (Office):  Nitrite, UA   Date Value Ref Range Status   07/19/2018 Negative Negative Final   07/12/2018 Negative Negative Final       Lab Urinalysis:  Blood, UA   Date Value Ref Range Status   07/19/2018 Negative Negative Final   07/12/2018 Moderate (!) Negative Final     Nitrite, UA   Date Value Ref Range Status   07/19/2018 Negative Negative Final   07/12/2018 Negative Negative Final     Leukocytes, UA   Date Value Ref Range Status   07/19/2018 Negative Negative Final   07/12/2018 Negative Negative Final     pH, UA   Date Value Ref Range Status   07/19/2018 7.0 5.0 - 8.0 Final   07/12/2018 6.0 4.5 - 8.0 Final    and Acute Labs   CBC   WBC   Date Value Ref Range Status   07/13/2018 15.3 (H) 4.0 - 11.0 thou/uL Final   07/12/2018 14.3 (H) 4.0 - 11.0 thou/uL Final     Hemoglobin   Date Value Ref Range Status   07/13/2018 14.9 14.0 - 18.0 g/dL Final   07/12/2018 15.7 14.0 - 18.0 g/dL Final     Platelets   Date Value Ref Range Status   07/13/2018 238 140 - 440 thou/uL Final   07/12/2018 268 140 - 440 thou/uL Final   , Renal Panel  [facility]  Creatinine   Date Value Ref Range Status   07/13/2018 1.25 0.70 - 1.30 mg/dL Final   07/12/2018 1.41 (H) 0.70 - 1.30 mg/dL Final   07/12/2018 1.48 (H) 0.70 - 1.30 mg/dL Final     Potassium   Date Value Ref Range Status   07/13/2018 3.5 3.5 - 5.0 mmol/L Final   07/12/2018 4.1 3.5 - 5.0 mmol/L Final   07/12/2018 3.9 3.5 - 5.0 mmol/L Final     Calcium   Date Value Ref Range Status   07/13/2018 8.4 (L) 8.5 - 10.5 mg/dL Final   07/12/2018 8.1 (L) 8.5 - 10.5 mg/dL Final   07/12/2018 9.4 8.5 -  10.5 mg/dL Final    and Urine Culture  No results found for: CULTURE

## 2021-06-19 NOTE — PROGRESS NOTES
Assessment/Plan:        Diagnoses and all orders for this visit:    Calculus of ureter  -     Symptom Control While Passing a Stone Education  -     CT Abdomen Pelvis Without Oral Without IV Contrast; Future; Expected date: 9/11/18  -     tamsulosin (FLOMAX) 0.4 mg cap; Take 1 capsule (0.4 mg total) by mouth daily for 14 days.  Dispense: 14 capsule; Refill: 1  -     oxyCODONE (ROXICODONE) 5 MG immediate release tablet; Take 1-2 tablets (5-10 mg total) by mouth every 4 (four) hours as needed for pain.  Dispense: 20 tablet; Refill: 0  -     Patient Stated Goal: Pass my stone    Acute left flank pain    Urinary tract stones  -     Urinalysis Macroscopic      Stone Management Plan  [facility] Stone [facility] 7/19/2018 8/9/2018 8/14/2018   Urinary Tract Infection No suspicion of infection No suspicion of infection No suspicion of infection   Renal Colic Asymptomatic at this time Asymptomatic at this time Well controlled symptoms   Renal Failure No suspicion of renal failure No suspicion of renal failure No suspicion of renal failure   Current CT date 7/12/2018 - 8/14/2018   Right sided stones? No - No   R Stone Event No current event No current event No current event   Left sided stones? Yes - Yes   L Number of ureteral stones 2 - 1   L GSD of ureteral stones 3 - 3   L Location of ureteral stone Distal - Distal   L Number of kidney stones  No renal stones - No renal stones   L GSD of kidney stones N/A - N/A   L Hydronephrosis Mild - None   L Stone Event New event Resolved event Established event   Diagnosis date 7/12/2018 - 7/12/2018   Initial location of primary symptomatic stone Distal - Distal   Initial GSD of primary symptomatic stone 3 - 5   Resolved date - 8/9/2018 -   L MET Status Initiation - No progression   L Current Plan MET - MET   MET - - (No Data)         Subjective:      HPI  Mr. Deonna Honeycutt is a 32 y.o. Bell male returning to the [facility] [place] for unanticipated visit with acute exacerbation  of chronic stone disease.      He is a first time unidentified composition stone former who has not required stone clearance procedures. He has previously participated in stone risk evaluation and remains adherent to recommendations. He has identified modifiable stone risks including:  dietary hyperoxaluria. He has no identified non-modifiable stone risk factors.    Lay had recurrence of left flank pain one day ago. The pain was similar to what he experienced with initial stone event, when he was diagnosed with a left distal ureteral stone in July. On his last visit, he was asymptomatic and it was suspected his stone had passed but was not retrieved. He is asymptomatic at present. Pertinent negative current symptoms include:  fever, chills, nausea, vomiting, hematuria, urinary frequency and dysuria.    CT scan from today is personally reviewed and demonstrates persistent 3 mm left UVJ calculus with resolution of previous left hydronephrosis.    Significant labs from presentation include no hematuria, no pyuria, negative nitrite and no bacteria.    PLAN    33 yo [place] first time stone former with persistent left distal ureteral stone with no current hydronephrosis.     Will resume with medical expulsive therapy. Risks and benefits were detailed of medical expulsive therapy including probability of stone passage, recurrent renal colic, and requirement of emergency medical and/or surgical care and further imaging. Patient verbalized understanding. Patient agrees with plan as discussed. He will return in 3-4   weeks with low dose CT scan. If stone persists at next encounter, will proceed with surgical intervention.    For symptom control, he was prescribed oxycodone and Flomax. Over the counter symptom control medications of ibuprofen, Dramamine and Tylenol were recommended.       ROS   Review of systems is negative except for HPI.    Past Medical History:   Diagnosis Date     Kidney stone 07/2018    first stone at  age 32       No past surgical history on file.    Current Outpatient Prescriptions   Medication Sig Dispense Refill     oxyCODONE (ROXICODONE) 5 MG immediate release tablet Take 1-2 tablets (5-10 mg total) by mouth every 4 (four) hours as needed for pain. 20 tablet 0     tamsulosin (FLOMAX) 0.4 mg cap Take 1 capsule (0.4 mg total) by mouth daily for 14 days. 14 capsule 1     No current facility-administered medications for this visit.        No Known Allergies    Social History     Social History     Marital status:      Spouse name: N/A     Number of children: N/A     Years of education: N/A     Occupational History     essembly      Social History Main Topics     Smoking status: Never Smoker     Smokeless tobacco: Never Used     Alcohol use Yes      Comment: social     Drug use: No     Sexual activity: Not on file     Other Topics Concern     Not on file     Social History Narrative       Family History   Problem Relation Age of Onset     Urolithiasis Mother      Urolithiasis Father      Objective:      Physical Exam  Vitals:    08/14/18 1509   BP: 129/75   Pulse: 78   Temp: 98.4  F (36.9  C)     General - well developed, well nourished, appropriate for age. Appears no distress at this time  Abdomen - slender soft, non-tender, no hepatosplenomegaly, no masses.   - no flank tenderness, no suprapubic tenderness, kidney and bladder non-palpable  MSK - normal spinal curvature. no spinal tenderness. normal gait. muscular strength intact.  Psych - oriented to time, place, and person, normal mood and affect.      Labs   Urinalysis POC (Office):  Nitrite, UA   Date Value Ref Range Status   08/14/2018 Negative Negative Final   08/09/2018 Negative Negative Final   07/19/2018 Negative Negative Final       Lab Urinalysis:  Blood, UA   Date Value Ref Range Status   08/14/2018 Negative Negative Final   08/09/2018 Negative Negative Final   07/19/2018 Negative Negative Final     Nitrite, UA   Date Value Ref Range Status    08/14/2018 Negative Negative Final   08/09/2018 Negative Negative Final   07/19/2018 Negative Negative Final     Leukocytes, UA   Date Value Ref Range Status   08/14/2018 Negative Negative Final   08/09/2018 Negative Negative Final   07/19/2018 Negative Negative Final     pH, UA   Date Value Ref Range Status   08/14/2018 6.0 5.0 - 8.0 Final   08/09/2018 5.5 5.0 - 8.0 Final   07/19/2018 7.0 5.0 - 8.0 Final

## 2021-06-19 NOTE — PROGRESS NOTES
Assessment/Plan:        Diagnoses and all orders for this visit:    Hyperoxaluria (H)  -     Patient Stated Goal: Prevent further stones  -     Low Oxalate Food List Education    Urinary tract stones  -     Urinalysis Macroscopic      Stone Management Plan  [facility] Stone Management 7/19/2018 8/9/2018   Urinary Tract Infection No suspicion of infection No suspicion of infection   Renal Colic Asymptomatic at this time Asymptomatic at this time   Renal Failure No suspicion of renal failure No suspicion of renal failure   Current CT date 7/12/2018 -   Right sided stones? No -   R Stone Event No current event No current event   Left sided stones? Yes -   L Number of ureteral stones 2 -   L GSD of ureteral stones 3 -   L Location of ureteral stone Distal -   L Number of kidney stones  No renal stones -   L GSD of kidney stones N/A -   L Hydronephrosis Mild -   L Stone Event New event Resolved event   Diagnosis date 7/12/2018 -   Initial location of primary symptomatic stone Distal -   Initial GSD of primary symptomatic stone 3 -   Resolved date - 8/9/2018   L MET Status Initiation -   L Current Plan MET -         Subjective:      HPI  Mr. Deonna Honeycutt is a 32 y.o. [place] male returning to the [facility] for review of initial stone risk evaluation.     He is a first time unidentified composition stone former who has not required stone clearance procedures. He has not previously participated in stone risk evaluation. He has identified modifiable stone risks including:  dietary hyperoxaluria. He has no identified non-modifiable stone risk factors.     He is asymptomatic at present. He denies symptoms of fever, chills, flank pain, nausea, vomiting, urinary frequency and dysuria.     Evaluation of serum lab results demonstrates no significant abnormalities, normal PTH, normal venous calcium, normal ionized calcium and no hyperuricemia. Two 24 hour urine collections demonstrate good urine volume (2600, 2475 mL),  creatinine (2291, 1614 mg), calcium (120, 119 mg), sodium (161, 176 mmol), citrate (374, 284 mg),  moderate hyperoxaluria (77, 48 mg), and pH (6 on both). Dietary journal is not available for review. He does eat consistent, high amounts of nuts and sweets (chocolate), in addition to soy products.    PLAN    31 yo [place] first time stone former with suspected interval passage of ureteral stone, no specimen retrieved. Initial stone risk evaluation notable for predominant risk of dietary hyperoxaluria.    Based on review of findings with the patient, he will maintain increased fluid consumption to generate at least 2,000 ml urine daily and initiate rigorous avoidance of foods greater than 50 mg/100g oxalate. He is happy to follow up on a prn basis.    Patient also seen and examined by Sarai Brian PA-C       ROS   Review of systems is negative except for HPI.    Past Medical History:   Diagnosis Date     Kidney stone 07/2018    first stone at age 32       History reviewed. No pertinent surgical history.    No current outpatient prescriptions on file.     No current facility-administered medications for this visit.        No Known Allergies    Social History     Social History     Marital status:      Spouse name: N/A     Number of children: N/A     Years of education: N/A     Occupational History     essembly      Social History Main Topics     Smoking status: Never Smoker     Smokeless tobacco: Never Used     Alcohol use Yes      Comment: social     Drug use: No     Sexual activity: Not on file     Other Topics Concern     Not on file     Social History Narrative       Family History   Problem Relation Age of Onset     Urolithiasis Mother      Urolithiasis Father      Objective:      Physical Exam  Vitals:    08/09/18 0827   BP: 131/89   Pulse: 82   Temp: 98.1  F (36.7  C)     General - well developed, well nourished, appropriate for age. Appears no distress at this time  Abdomen - slender soft, non-tender, no  hepatosplenomegaly, no masses.   - no flank tenderness, no suprapubic tenderness, kidney and bladder non-palpable  MSK - normal spinal curvature. no spinal tenderness. normal gait. muscular strength intact.  Psych - oriented to time, place, and person, normal mood and affect.      Labs   Urinalysis POC (Office):  Nitrite, UA   Date Value Ref Range Status   08/09/2018 Negative Negative Final   07/19/2018 Negative Negative Final   07/12/2018 Negative Negative Final       Lab Urinalysis:  Blood, UA   Date Value Ref Range Status   08/09/2018 Negative Negative Final   07/19/2018 Negative Negative Final   07/12/2018 Moderate (!) Negative Final     Nitrite, UA   Date Value Ref Range Status   08/09/2018 Negative Negative Final   07/19/2018 Negative Negative Final   07/12/2018 Negative Negative Final     Leukocytes, UA   Date Value Ref Range Status   08/09/2018 Negative Negative Final   07/19/2018 Negative Negative Final   07/12/2018 Negative Negative Final     pH, UA   Date Value Ref Range Status   08/09/2018 5.5 5.0 - 8.0 Final   07/19/2018 7.0 5.0 - 8.0 Final   07/12/2018 6.0 4.5 - 8.0 Final    and Stone prevention labs   Serum chemistries   Creatinine   Date Value Ref Range Status   07/19/2018 1.21 0.70 - 1.30 mg/dL Final   07/19/2018 1.17 0.70 - 1.30 mg/dL Final   07/13/2018 1.25 0.70 - 1.30 mg/dL Final     Potassium   Date Value Ref Range Status   07/19/2018 4.7 3.5 - 5.0 mmol/L Final   07/13/2018 3.5 3.5 - 5.0 mmol/L Final   07/12/2018 4.1 3.5 - 5.0 mmol/L Final     Calcium   Date Value Ref Range Status   07/19/2018 9.5 8.5 - 10.5 mg/dL Final   07/19/2018 9.5 8.5 - 10.5 mg/dL Final   07/13/2018 8.4 (L) 8.5 - 10.5 mg/dL Final     Phosphorus   Date Value Ref Range Status   07/19/2018 3.1 2.5 - 4.5 mg/dL Final   07/19/2018 3.2 2.5 - 4.5 mg/dL Final     Uric Acid   Date Value Ref Range Status   07/19/2018 7.2 3.0 - 8.0 mg/dL Final   , Calcium metabolism   Calcium, Ionized Measured   Date Value Ref Range Status    07/19/2018 1.19 1.11 - 1.30 mmol/L Final      PTH   Date Value Ref Range Status   07/19/2018 55 10 - 86 pg/mL Final     No results found for: HEAKNWDT69CX  and 24 hour urine   Calcium, 24H Urine   Date Value Ref Range Status   07/28/2018 120 25 - 300 mg/24hr Final     Comment:       Hypercalciuria >350  Values are for persons with  average daily calcium intake  (600-800 mg/day)   07/21/2018 119 25 - 300 mg/24hr Final     Comment:       Hypercalciuria >350  Values are for persons with  average daily calcium intake  (600-800 mg/day)     Sodium, 24 Hour Urine   Date Value Ref Range Status   07/28/2018 161 40 - 217 mmol/24hr Final   07/21/2018 176 40 - 217 mmol/24hr Final     Citrate, 24 Hour Urine   Date Value Ref Range Status   07/28/2018 374 221 - 1191 mg/24hr Final     Comment:       Reference Ranges are not  established for 0-19 years  or >60 years of age.   07/21/2018 284 221 - 1191 mg/24hr Final     Comment:       Reference Ranges are not  established for 0-19 years  or >60 years of age.     Oxalate, 24 Hour Urine   Date Value Ref Range Status   07/28/2018 77.0 (H) 7.0 - 44.0 mg/24hr Final   07/21/2018 48.3 (H) 7.0 - 44.0 mg/24hr Final     pH, Urine   Date Value Ref Range Status   07/28/2018 6.0 4.5 - 8.0 Final   07/21/2018 6.0 4.5 - 8.0 Final     Urine Volume   Date Value Ref Range Status   07/28/2018 2600 mL Final   07/21/2018 2475 mL Final

## 2021-06-20 NOTE — PROGRESS NOTES
Patient in office today for cystoscopy left ureteral stent removal procedure.    Patient educated regarding stent removal procedure and possible symptoms after removal through [facility] .  Patient voiced understanding of information.  Handout given to patient.  Consent form signed.    KSI Timeout    Correct patient?: Yes  Correct site?:  Yes  Correct procedure?:  Yes  Correct laterality?:  Left  Consents verified?:  Yes  Relevant lab results available?:  Yes

## 2021-06-20 NOTE — ANESTHESIA CARE TRANSFER NOTE
Patient spontaneously breathing, LMA removed without complication.  O2 via mask at 10L.  To PACU, VSS, SBAR report to RN per institutional policy and procedure.  Transfer of care.    Last vitals:   Vitals:    09/25/18 1000   BP: (!) 141/93   Pulse: 96   Resp: 20   Temp: 36.8  C (98.2  F)   SpO2: 100%     Patient's level of consciousness is drowsy  Spontaneous respirations: yes  Maintains airway independently: yes  Dentition unchanged: yes  Oropharynx: oropharynx clear of all foreign objects    QCDR Measures:  ASA# 20 - Surgical Safety Checklist: WHO surgical safety checklist completed prior to induction  PQRS# 430 - Adult PONV Prevention: 4558F - Pt received => 2 anti-emetic agents (different classes) preop & intraop  ASA# 8 - Peds PONV Prevention: NA - Not pediatric patient, not GA or 2 or more risk factors NOT present  PQRS# 424 - [facility]-op Temp Management: 4559F - At least one body temp DOCUMENTED => 35.5C or 95.9F within required timeframe  PQRS# 426 - PACU Transfer Protocol: - Transfer of care checklist used  ASA# 14 - Acute Post-op Pain: ASA14B - Patient did NOT experience pain >= 7 out of 10

## 2021-06-20 NOTE — PROGRESS NOTES
Assessment/Plan:        Diagnoses and all orders for this visit:    Calculus of ureter  -     Cystoscopy with Stent Removal Education  -     Patient Stated Goal: Prevent further stones  -     CT Abdomen Pelvis Without Oral Without IV Contrast; Future; Expected date: 11/7/18    Urinary tract stones  -     Urinalysis Macroscopic  -     Culture, Urine- Future; Future; Expected date: 11/7/18  -     Culture, Urine- Future  -     lidocaine HCl 2 % topical jelly 10 mL (UROJET); Insert 10 mL into the urethra once.  -     ciprofloxacin HCl tablet 250 mg (CIPRO); Take 1 tablet (250 mg total) by mouth once.    Other orders  -     ciprofloxacin HCl (CIPRO) 250 MG tablet;   -     lidocaine HCl (UROJET) 2 % topical jelly;       Stone Management Plan  [facility] Stone Management 8/14/2018 9/20/2018 10/8/2018   Urinary Tract Infection No suspicion of infection No suspicion of infection No suspicion of infection   Renal Colic Well controlled symptoms Well controlled symptoms Well controlled symptoms   Renal Failure No suspicion of renal failure No suspicion of renal failure No suspicion of renal failure   Current CT date 8/14/2018 9/20/2018 -   Right sided stones? No No -   R Stone Event No current event No current event No current event   Left sided stones? Yes Yes -   L Number of ureteral stones 1 1 -   L GSD of ureteral stones 3 4 -   L Location of ureteral stone Distal Distal -   L Number of kidney stones  No renal stones No renal stones -   L GSD of kidney stones N/A N/A -   L Hydronephrosis None None -   L Stone Event Established event Established event Established event   Diagnosis date 7/12/2018 - -   Initial location of primary symptomatic stone Distal - -   Initial GSD of primary symptomatic stone 5 - -   Resolved date - - -   Post-op status - - Stent Removal   L MET Status No progression No progression -   L Current Plan MET Clear -   MET (No Data) - -   Clear rationale - MET failure -             Subjective:      JOSUÉ VALLE  Yinka is a 32 y.o. [place] male returning to the [facility] for early postoperative follow up for anticipated stent removal.     He returns status post left ureteroscopic extraction for distal ureteral stone. Stone was found to be severely impacted and [place] knife incision of ureteric orifice was performed. He has had no unanticipated post-operative events.    He has had no symptoms suspicious for infection and stent was mildly symptomatic with typical issues of flank discomfort and lower urinary tract irritation.       Flexible cystoscopy is performed and indwelling stent is removed without incident.    He will follow up in the office in one month with imaging.       ROS   Review of systems is negative except for HPI.    Past Medical History:   Diagnosis Date     Kidney stone 07/2018    first stone at age 32       Past Surgical History:   Procedure Laterality Date     none       MD CYSTOSCOPY,INSERT URETERAL STENT Left 9/25/2018    Procedure: CYSTOSCOPY LEFT URETEROSCOPY STENT INSERTION, INCISION OF URETER;  Surgeon: Corky Hercules MD;  Location: [facility];  Service: Urology       Current Outpatient Prescriptions   Medication Sig Dispense Refill     oxyCODONE (ROXICODONE) 5 MG immediate release tablet Take 1-2 tablets (5-10 mg total) by mouth every 4 (four) hours as needed for pain. 20 tablet 0     Current Facility-Administered Medications   Medication Dose Route Frequency Provider Last Rate Last Dose     ciprofloxacin HCl (CIPRO) 250 MG tablet              lidocaine HCl (UROJET) 2 % topical jelly                No Known Allergies    Social History     Social History     Marital status:      Spouse name: N/A     Number of children: N/A     Years of education: N/A     Occupational History     essembly      Social History Main Topics     Smoking status: Never Smoker     Smokeless tobacco: Never Used     Alcohol use Yes      Comment: social     Drug use: No     Sexual activity:  Not on file     Other Topics Concern     Not on file     Social History Narrative       Family History   Problem Relation Age of Onset     Urolithiasis Mother      Urolithiasis Father      Objective:      Physical Exam  Vitals:    10/08/18 1010   BP: 121/74   Pulse: 78   Temp: 98.2  F (36.8  C)     General - well developed, well nourished, appropriate for age. Appears no distress at this time  Abdomen - mildly obese soft, non-tender, no hepatosplenomegaly, no masses.   - no flank tenderness, no suprapubic tenderness, kidney and bladder non-palpable  MSK - normal spinal curvature. no spinal tenderness. normal gait. muscular strength intact.  Psych - oriented to time, place, and person, normal mood and affect.      Labs   Urinalysis POC (Office):  Nitrite, UA   Date Value Ref Range Status   10/08/2018 Negative Negative Final   09/20/2018 Negative Negative Final   08/14/2018 Negative Negative Final       Lab Urinalysis:  Blood, UA   Date Value Ref Range Status   10/08/2018 Large (!) Negative Final   09/20/2018 Trace (!) Negative Final   08/14/2018 Negative Negative Final     Nitrite, UA   Date Value Ref Range Status   10/08/2018 Negative Negative Final   09/20/2018 Negative Negative Final   08/14/2018 Negative Negative Final     Leukocytes, UA   Date Value Ref Range Status   10/08/2018 Moderate (!) Negative Final   09/20/2018 Negative Negative Final   08/14/2018 Negative Negative Final     pH, UA   Date Value Ref Range Status   10/08/2018 6.5 5.0 - 8.0 Final   09/20/2018 7.0 5.0 - 8.0 Final   08/14/2018 6.0 5.0 - 8.0 Final

## 2021-06-20 NOTE — PROGRESS NOTES
Assessment/Plan:        Diagnoses and all orders for this visit:    Calculus of ureter  -     tamsulosin (FLOMAX) 0.4 mg cap; Take 1 capsule (0.4 mg total) by mouth Daily after breakfast for 7 days.  Dispense: 7 capsule; Refill: 1  -     Cancel: Place sequential compression device; Standing  -     Cancel: Insert and maintain IV; Standing  -     Cancel: lidocaine 10 mg/mL (1 %) injection 0.1-0.3 mL; Inject 0.1-0.3 mL under the skin as needed (for IV insertion).  -     Cancel: sodium chloride flush 3 mL (NS); Infuse 3 mL into a venous catheter Line Care.  -     Patient Stated Goal: Know what to expect after surgery  -     Ureteroscopy Education  -     Cancel: Verify informed consent; Standing  -     Cancel: Diet NPO; Standing  -     Cancel: XR Abdomen AP; Standing  -     Cancel: levoFLOXacin 500 mg/100 mL IVPB 500 mg (LEVAQUIN); Infuse 100 mL (500 mg total) into a venous catheter 60 minutes before surgery or procedure for Prior to Procedure (On Call to OR).    Urinary tract stones  -     Urinalysis Macroscopic    Other orders  -     Discontinue: tamsulosin (FLOMAX) 0.4 mg cap; Take 0.4 mg by mouth.      Stone Management Plan  [facility] Stone Management 8/9/2018 8/14/2018 9/20/2018   Urinary Tract Infection No suspicion of infection No suspicion of infection No suspicion of infection   Renal Colic Asymptomatic at this time Well controlled symptoms Well controlled symptoms   Renal Failure No suspicion of renal failure No suspicion of renal failure No suspicion of renal failure   Current CT date - 8/14/2018 9/20/2018   Right sided stones? - No No   R Stone Event No current event No current event No current event   Left sided stones? - Yes Yes   L Number of ureteral stones - 1 1   L GSD of ureteral stones - 3 4   L Location of ureteral stone - Distal Distal   L Number of kidney stones  - No renal stones No renal stones   L GSD of kidney stones - N/A N/A   L Hydronephrosis - None None   L Stone Event Resolved event Established  event Established event   Diagnosis date - 7/12/2018 -   Initial location of primary symptomatic stone - Distal -   Initial GSD of primary symptomatic stone - 5 -   Resolved date 8/9/2018 - -   L MET Status - No progression No progression   L Current Plan - MET Clear   MET - (No Data) -   Clear rationale - - MET failure         Subjective:      HPI  Mr. Deonna Honeycutt is a 32 y.o. [place] male returning to the [facility] for medical expulsive therapy follow up.     On last encounter, his 3 mm stone was in left distal ureter with no hydronephrosis. He has had no unanticipated events.    Symptoms have been well controlled with medication and he is able to carry on normal activities. He has had intermittent, mild left flank pain. He has not seen a stone pass or retrieved a specimen. He is asymptomatic at present. He denies symptoms of fever, chills, flank pain, nausea, vomiting, urinary frequency and dysuria.     New CT scan was personally reviewed and demonstrates no progression of left extreme distal ureteral stone with no hydronephrosis.     PLAN    33 yo [place] M first time stone former with persistent left distal ureteral stone with no current hydronephrosis.    He has failed adequate duration of medical expulsive therapy and will proceed to the operating room for ureteroscopic stone clearance. Risks and benefits were detailed of ureteroscopic stone clearance including potential issues of urinary or systemic infection, ureteral injury, inaccessible stone, incomplete stone clearance, multiple surgeries, and stent related symptoms of urgency, frequency and hematuria. Current documentation is adequate for preoperative history and physical.     Refilled flomax, as he ran out. He does not need additional opioid at this time.    Patient also seen and examined by LUZ MARINA Park   Review of systems is negative except for HPI.    Past Medical History:   Diagnosis Date     Kidney stone 07/2018     first stone at age 32       History reviewed. No pertinent surgical history.    Current Outpatient Prescriptions   Medication Sig Dispense Refill     oxyCODONE (ROXICODONE) 5 MG immediate release tablet Take 1-2 tablets (5-10 mg total) by mouth every 4 (four) hours as needed for pain. 20 tablet 0     tamsulosin (FLOMAX) 0.4 mg cap Take 1 capsule (0.4 mg total) by mouth Daily after breakfast for 7 days. 7 capsule 1     No current facility-administered medications for this visit.        No Known Allergies    Social History     Social History     Marital status:      Spouse name: N/A     Number of children: N/A     Years of education: N/A     Occupational History     essembly      Social History Main Topics     Smoking status: Never Smoker     Smokeless tobacco: Never Used     Alcohol use Yes      Comment: social     Drug use: No     Sexual activity: Not on file     Other Topics Concern     Not on file     Social History Narrative       Family History   Problem Relation Age of Onset     Urolithiasis Mother      Urolithiasis Father      Objective:      Physical Exam  Vitals:    09/20/18 1527   BP: 129/79   Pulse: 72   Temp: 98.2  F (36.8  C)     General - well developed, well nourished, appropriate for age. Appears no distress at this time   Heart - regular rate and rhythm, no murmur  Respiratory - normal effort, clear to auscultation, good air entry without adventitious noises  Abdomen - slender soft, non-tender, no hepatosplenomegaly, no masses.   - no flank tenderness, no suprapubic tenderness, kidney and bladder non-palpable  MSK - normal spinal curvature. no spinal tenderness. normal gait. muscular strength intact.  Neurology - cranial nerves II-XII grossly intact, normal sensation, no unsteadiness  Skin - intact, no bruising, no gouty tophi  Psych - oriented to time, place, and person, normal mood and affect.      Labs   Urinalysis POC (Office):  Nitrite, UA   Date Value Ref Range Status   09/20/2018  Negative Negative Final   08/14/2018 Negative Negative Final   08/09/2018 Negative Negative Final       Lab Urinalysis:  Blood, UA   Date Value Ref Range Status   09/20/2018 Trace (!) Negative Final   08/14/2018 Negative Negative Final   08/09/2018 Negative Negative Final     Nitrite, UA   Date Value Ref Range Status   09/20/2018 Negative Negative Final   08/14/2018 Negative Negative Final   08/09/2018 Negative Negative Final     Leukocytes, UA   Date Value Ref Range Status   09/20/2018 Negative Negative Final   08/14/2018 Negative Negative Final   08/09/2018 Negative Negative Final     pH, UA   Date Value Ref Range Status   09/20/2018 7.0 5.0 - 8.0 Final   08/14/2018 6.0 5.0 - 8.0 Final   08/09/2018 5.5 5.0 - 8.0 Final

## 2021-06-20 NOTE — ANESTHESIA PREPROCEDURE EVALUATION
Anesthesia Evaluation      Patient summary reviewed   No history of anesthetic complications     Airway   Mallampati: II  Neck ROM: full   Pulmonary - negative ROS and normal exam                          Cardiovascular - negative ROS and normal exam   Neuro/Psych - negative ROS     Endo/Other - negative ROS      GI/Hepatic/Renal    (+)   chronic renal disease,           Dental - normal exam                        Anesthesia Plan  Planned anesthetic: general LMA  50 mg ketamine IV on induction.    ASA 2   Induction: intravenous   Anesthetic plan and risks discussed with: patient  Anesthesia plan special considerations: antiemetics,   Post-op plan: routine recovery

## 2021-06-20 NOTE — ANESTHESIA POSTPROCEDURE EVALUATION
Patient: Lay D Dah  CYSTOSCOPY LEFT URETEROSCOPY STENT INSERTION, INCISION OF URETER  Anesthesia type: general    Patient location: PACU  Last vitals:   Vitals:    09/25/18 1130   BP: 125/84   Pulse: 90   Resp: 16   Temp:    SpO2:      Post vital signs: stable  Level of consciousness: awake and responds to simple questions  Post-anesthesia pain: pain controlled  Post-anesthesia nausea and vomiting: no  Pulmonary: unassisted, return to baseline  Cardiovascular: stable and blood pressure at baseline  Hydration: adequate  Anesthetic events: no    QCDR Measures:  ASA# 11 - [place]-op Cardiac Arrest: ASA11B - Patient did NOT experience unanticipated cardiac arrest  ASA# 12 - [place]-op Mortality Rate: ASA12B - Patient did NOT die  ASA# 13 - PACU Re-Intubation Rate: ASA13B - Patient did NOT require a new airway mgmt  ASA# 10 - Composite Anes Safety: ASA10A - No serious adverse event    Additional Notes:

## 2021-06-21 NOTE — PROGRESS NOTES
Assessment/Plan:        Diagnoses and all orders for this visit:    Calculus of ureter  -     CT Abdomen Pelvis Without Oral Without IV Contrast; Future; Expected date: 11/15/2019  -     Patient Stated Goal: Prevent further stones  -     Low Oxalate Food List Education    Calculus of kidney  -     Urinalysis Macroscopic    Hyperoxaluria  -     Low Oxalate Food List Education      Stone Management Plan  [facility] Stone Management 9/20/2018 10/8/2018 11/15/2018   Urinary Tract Infection No suspicion of infection No suspicion of infection No suspicion of infection   Renal Colic Well controlled symptoms Well controlled symptoms Asymptomatic at this time   Renal Failure No suspicion of renal failure No suspicion of renal failure No suspicion of renal failure   Current CT date 9/20/2018 - 11/15/2018   Right sided stones? No - Yes   R Stone Event No current event No current event No current event   Left sided stones? Yes - No   L Number of ureteral stones 1 - -   L GSD of ureteral stones 4 - -   L Location of ureteral stone Distal - -   L Number of kidney stones  No renal stones - -   L GSD of kidney stones N/A - -   L Hydronephrosis None - -   L Stone Event Established event Established event Resolved event   Diagnosis date - - -   Initial location of primary symptomatic stone - - -   Initial GSD of primary symptomatic stone - - -   Resolved date - - 11/15/2018   Post-op status - Stent Removal No residual stone   L MET Status No progression - -   L Current Plan Clear - -   MET - - -   Clear rationale MET failure - -         Subjective:      HPI  Mr. Deonna Honeycutt is a 32 y.o. [place] male returning to the [facility] [place] for late postoperative follow-up.     He returns status post Left ureteroscopic extraction for severely impacted distal ureteral stone, requiring incision of ureteral orifice. He has had no unanticipated events.     He is asymptomatic at present. He denies symptoms of fever, chills, flank pain,  nausea, vomiting, urinary frequency and dysuria.    New CT scan was personally reviewed and demonstrates complete clearance of targeted stone  with no hydronephrosis.     Stone composition was 60 % calcium oxalate and 40 % calcium phosphate (apatite).     PLAN    33 yo [place] first time calcium based stone former s/p left ureteroscopic extraction of severely impacted distal ureteral stone.     He has previously undergone stone risk evaluation and will resume existing risk reduction strategy, focusing on dietary oxalate restriction.    Patient also seen and examined by LUZ MARINA Park   Review of systems is negative except for HPI.    Past Medical History:   Diagnosis Date     Kidney stone 07/2018    first stone at age 32       Past Surgical History:   Procedure Laterality Date     none       NC CYSTOSCOPY,INSERT URETERAL STENT Left 9/25/2018    Procedure: CYSTOSCOPY LEFT URETEROSCOPY STENT INSERTION, INCISION OF URETER;  Surgeon: Corky Hercules MD;  Location: [facility];  Service: Urology       Current Outpatient Medications   Medication Sig Dispense Refill     oxyCODONE (ROXICODONE) 5 MG immediate release tablet Take 1-2 tablets (5-10 mg total) by mouth every 4 (four) hours as needed for pain. 20 tablet 0     No current facility-administered medications for this visit.        No Known Allergies    Social History     Socioeconomic History     Marital status:      Spouse name: Not on file     Number of children: Not on file     Years of education: Not on file     Highest education level: Not on file   Social Needs     Financial resource strain: Not on file     Food insecurity - worry: Not on file     Food insecurity - inability: Not on file     Transportation needs - medical: Not on file     Transportation needs - non-medical: Not on file   Occupational History     Occupation: essembly   Tobacco Use     Smoking status: Never Smoker     Smokeless tobacco: Never Used   Substance and  Sexual Activity     Alcohol use: Yes     Comment: social     Drug use: No     Sexual activity: Not on file   Other Topics Concern     Not on file   Social History Narrative     Not on file       Family History   Problem Relation Age of Onset     Urolithiasis Mother      Urolithiasis Father      Objective:      Physical Exam  There were no vitals filed for this visit.  General - well developed, well nourished, appropriate for age. Appears no distress at this time  Abdomen - slender soft, non-tender, no hepatosplenomegaly, no masses.   - no flank tenderness, no suprapubic tenderness, kidney and bladder non-palpable  MSK - normal spinal curvature. no spinal tenderness. normal gait. muscular strength intact.  Psych - oriented to time, place, and person, normal mood and affect.      Labs   Urinalysis POC (Office):  Nitrite, UA   Date Value Ref Range Status   10/08/2018 Negative Negative Final   09/20/2018 Negative Negative Final   08/14/2018 Negative Negative Final       Lab Urinalysis:  Blood, UA   Date Value Ref Range Status   10/08/2018 Large (!) Negative Final   09/20/2018 Trace (!) Negative Final   08/14/2018 Negative Negative Final     Nitrite, UA   Date Value Ref Range Status   10/08/2018 Negative Negative Final   09/20/2018 Negative Negative Final   08/14/2018 Negative Negative Final     Leukocytes, UA   Date Value Ref Range Status   10/08/2018 Moderate (!) Negative Final   09/20/2018 Negative Negative Final   08/14/2018 Negative Negative Final     pH, UA   Date Value Ref Range Status   10/08/2018 6.5 5.0 - 8.0 Final   09/20/2018 7.0 5.0 - 8.0 Final   08/14/2018 6.0 5.0 - 8.0 Final

## 2021-07-02 ENCOUNTER — OFFICE VISIT (OUTPATIENT)
Dept: FAMILY MEDICINE | Facility: CLINIC | Age: 36
End: 2021-07-02
Payer: COMMERCIAL

## 2021-07-02 VITALS
HEART RATE: 75 BPM | HEIGHT: 69 IN | TEMPERATURE: 98.3 F | DIASTOLIC BLOOD PRESSURE: 87 MMHG | WEIGHT: 174.8 LBS | RESPIRATION RATE: 18 BRPM | OXYGEN SATURATION: 100 % | SYSTOLIC BLOOD PRESSURE: 124 MMHG | BODY MASS INDEX: 25.89 KG/M2

## 2021-07-02 DIAGNOSIS — Z00.00 ROUTINE GENERAL MEDICAL EXAMINATION AT A HEALTH CARE FACILITY: Primary | ICD-10-CM

## 2021-07-02 LAB
CHOLEST SERPL-MCNC: 261.6 MG/DL (ref 0–200)
CHOLEST/HDLC SERPL: 5.9 {RATIO} (ref 0–5)
GLUCOSE P FAST SERPL-MCNC: 290 MG/DL (ref 51–109)
HDLC SERPL-MCNC: 44.4 MG/DL
HEMOGLOBIN: 16.7 G/DL (ref 13.3–17.7)
HIV 1+2 AB+HIV1 P24 AG SERPL QL IA: NEGATIVE
LDLC SERPL CALC-MCNC: 168 MG/DL (ref 0–129)
TRIGL SERPL-MCNC: 246.1 MG/DL (ref 0–150)
VLDL CHOLESTEROL: 49.2 MG/DL (ref 7–32)

## 2021-07-02 PROCEDURE — 80061 LIPID PANEL: CPT | Performed by: STUDENT IN AN ORGANIZED HEALTH CARE EDUCATION/TRAINING PROGRAM

## 2021-07-02 PROCEDURE — 85018 HEMOGLOBIN: CPT | Performed by: STUDENT IN AN ORGANIZED HEALTH CARE EDUCATION/TRAINING PROGRAM

## 2021-07-02 PROCEDURE — 82947 ASSAY GLUCOSE BLOOD QUANT: CPT | Performed by: STUDENT IN AN ORGANIZED HEALTH CARE EDUCATION/TRAINING PROGRAM

## 2021-07-02 PROCEDURE — 36415 COLL VENOUS BLD VENIPUNCTURE: CPT | Performed by: STUDENT IN AN ORGANIZED HEALTH CARE EDUCATION/TRAINING PROGRAM

## 2021-07-02 PROCEDURE — 99395 PREV VISIT EST AGE 18-39: CPT | Mod: GC | Performed by: STUDENT IN AN ORGANIZED HEALTH CARE EDUCATION/TRAINING PROGRAM

## 2021-07-02 SDOH — HEALTH STABILITY: MENTAL HEALTH: HOW OFTEN DO YOU HAVE 6 OR MORE DRINKS ON ONE OCCASION?: NEVER

## 2021-07-02 SDOH — HEALTH STABILITY: MENTAL HEALTH: HOW OFTEN DO YOU HAVE A DRINK CONTAINING ALCOHOL?: MONTHLY OR LESS

## 2021-07-02 SDOH — HEALTH STABILITY: MENTAL HEALTH: HOW MANY STANDARD DRINKS CONTAINING ALCOHOL DO YOU HAVE ON A TYPICAL DAY?: 1 OR 2

## 2021-07-02 ASSESSMENT — MIFFLIN-ST. JEOR: SCORE: 1726.01

## 2021-07-02 NOTE — NURSING NOTE
Due to patient being non-English speaking/uses sign language, an  was used for this visit. Only for face-to-face interpretation by an external agency, date and length of interpretation can be found on the scanned worksheet.     name: Carter Sánchez   Agency: [employer]  Language: Bell   Telephone number: [phone]  Type of interpretation: Face-to-face, spoken

## 2021-07-02 NOTE — PROGRESS NOTES
Male Physical Note      Concerns today: Lipoma    A Bell  was used for  this visit.     ROS:                      CONSTITUTIONAL: no fatigue, no unexpected change in weight  SKIN: no worrisome rashes, no worrisome moles, no worrisome lesions  EYES: no acute vision problems or changes  ENT: no ear problems, no mouth problems, no throat problems  RESP: no significant cough, no shortness of breath  CV: no chest pain, no palpitations, no new or worsening peripheral edema  GI: no nausea, no vomiting, no constipation, no diarrhea  : no frequency, no dysuria, no hematuria  PSYCHIATRIC: NEGATIVE for changes in mood or affect    Past Medical History:   Diagnosis Date     Ureterolithiasis 7/12/2018        Family History   Problem Relation Age of Onset     Nephrolithiasis Mother      Diabetes Father      Cancer No family hx of      Reviewed no other significant FH           Family History and past Medical History reviewed and unchanged/updated.    Social History     Tobacco Use     Smoking status: Never Smoker     Smokeless tobacco: Never Used   Substance Use Topics     Alcohol use: Yes     Frequency: Monthly or less     Drinks per session: 1 or 2     Binge frequency: Never     Comment: Alcoholic Drinks/day: social       Children ? yes three    Has anyone hurt you physically, for example by pushing, hitting, slapping or kicking you or forcing you to have sex? Denies  Do you feel threatened or controlled by a partner, ex-partner or anyone in your life? Denies    RISK BEHAVIORS AND HEALTHY HABITS:  Tobacco Use/Smoking: None  Illicit Drug Use: None  ETOH: Details 1-2x a year  Do you use alcohol? Yes  Sexually Active: Yes  Diet (5-7 servings of fruits/veg daily): Yes   Exercise (30 min accumulated most days):Yes   Dental Care: In November   Calcium 1500 mg/d:  No  Seat Belt Use: Yes     HIV screening:  Recommended and patient accepted testing.    CV Risk based on Pooled Cohort Risk:      Immunization History  "  Administered Date(s) Administered     COVID-19,PF,Pfizer 05/15/2021, 06/05/2021   Reviewed Immunization Record Today    EXAMINATION:  /87 (BP Location: Left arm, Patient Position: Sitting, Cuff Size: Adult Regular)   Pulse 75   Temp 98.3  F (36.8  C) (Oral)   Resp 18   Ht 1.749 m (5' 8.86\")   Wt 79.3 kg (174 lb 12.8 oz)   SpO2 100%   BMI 25.92 kg/m    GENERAL: healthy, alert and no distress  EYES: Eyes grossly normal to inspection, extraocular movements - intact, and PERRL  HENT: ear canals- normal; Nose- normal; Mouth- no ulcers, no lesions  NECK: no tenderness, no adenopathy, no asymmetry, no masses, no stiffness; thyroid- normal to palpation  RESP: lungs clear to auscultation - no rales, no rhonchi, no wheezes  CV: regular rates and rhythm, normal S1 S2, no S3 or S4 and no murmur, no click or rub -  ABDOMEN: soft, no tenderness, no  hepatosplenomegaly, no masses, normal bowel sounds  MS: extremities- no gross deformities noted, no edema  SKIN: 1 cm marble sized lipoma in the patient right buttock  NEURO: strength and tone- normal, sensory exam- grossly normal, mentation- intact, speech- normal, reflexes- symmetric  BACK: no CVA tenderness, no paralumbar tenderness  PSYCH: Alert and oriented times 3; speech- coherent , normal rate and volume; able to articulate logical thoughts, able to abstract reason, no tangential thoughts, no hallucinations or delusions, affect- normal  LYMPHATICS: ant. cervical- normal, post. cervical- normal, axillary- normal, supraclavicular- normal, inguinal- normal    ASSESSMENT:  1. Health Care Maintenance: Normal Physical Exam  2. [place] sized lipoma in the right buttock - return to clinic for lipoma excision.  3. HIV, Hep B/C Screening    PLAN:  1.Lipid panel, fasting glucose ordered. Will relay results to patient, appears that patient was not fasting but LDL was only moderately high. No concern for familial dyslipidemia.  2. return to clinic for lipoma excision.  3. " Baseline Hemoglobin - no anemia    This is a [facility] patient.  - Dr. Khai Solares PGY2

## 2021-07-05 LAB — HCV AB SER QL: NEGATIVE

## 2021-07-06 DIAGNOSIS — Z00.00 ROUTINE GENERAL MEDICAL EXAMINATION AT A HEALTH CARE FACILITY: Primary | ICD-10-CM

## 2021-07-06 NOTE — PROGRESS NOTES
Preceptor Attestation:    I discussed the patient with the resident and evaluated the patient in person. I have verified the content of the note, which accurately reflects my assessment of the patient and the plan of care.  Would repeat glucose and get AIC at follow-up visit.   Supervising Physician:  Delmer Diaz MD.

## 2021-07-09 ENCOUNTER — OFFICE VISIT (OUTPATIENT)
Dept: FAMILY MEDICINE | Facility: CLINIC | Age: 36
End: 2021-07-09
Payer: COMMERCIAL

## 2021-07-09 VITALS
TEMPERATURE: 98.1 F | DIASTOLIC BLOOD PRESSURE: 88 MMHG | BODY MASS INDEX: 26.42 KG/M2 | SYSTOLIC BLOOD PRESSURE: 129 MMHG | HEIGHT: 69 IN | OXYGEN SATURATION: 99 % | HEART RATE: 75 BPM | RESPIRATION RATE: 16 BRPM | WEIGHT: 178.4 LBS

## 2021-07-09 DIAGNOSIS — D17.30 LIPOMA OF SKIN AND SUBCUTANEOUS TISSUE: Primary | ICD-10-CM

## 2021-07-09 DIAGNOSIS — Z00.00 ROUTINE GENERAL MEDICAL EXAMINATION AT A HEALTH CARE FACILITY: ICD-10-CM

## 2021-07-09 PROCEDURE — 11402 EXC TR-EXT B9+MARG 1.1-2 CM: CPT | Mod: GC | Performed by: STUDENT IN AN ORGANIZED HEALTH CARE EDUCATION/TRAINING PROGRAM

## 2021-07-09 RX ORDER — IBUPROFEN 600 MG/1
600 TABLET, FILM COATED ORAL EVERY 6 HOURS PRN
Qty: 24 TABLET | Refills: 0 | Status: SHIPPED | OUTPATIENT
Start: 2021-07-09 | End: 2021-07-29

## 2021-07-09 RX ORDER — CEPHALEXIN 500 MG/1
500 CAPSULE ORAL 2 TIMES DAILY
Qty: 10 CAPSULE | Refills: 0 | Status: SHIPPED | OUTPATIENT
Start: 2021-07-09 | End: 2021-07-29

## 2021-07-09 ASSESSMENT — MIFFLIN-ST. JEOR: SCORE: 1741.1

## 2021-07-09 NOTE — PATIENT INSTRUCTIONS
Patient Education     Lipoma (Removed)   A lipoma is a growth made of fatty tissue. It is not cancer (benign). It looks like a soft lump, often less than 2 inches across. A lipoma may be removed (excised) because you don t like how it looks. Or it may be removed if it is painful or growing. A lipoma is made of fat cells. But it is not linked to diet. And it does not mean that you are overweight.  Home care  The following guidelines will help you care for your wound:    Keep the wound clean and dry. If a bandage was applied and it gets wet or dirty, replace it. Otherwise, leave it in place for the first 24 hours. Then change it once a day or as directed.    If stitches (sutures) were used, clean the wound daily:  ? After removing the bandage, wash the area with soap and water. Use a cotton swab to loosen and remove any blood or crust that forms.  ? After cleaning, apply a thin layer of petroleum ointment. Or your healthcare provider may advise an antibiotic ointment. This will keep the wound clean and make it easier to remove the stitches. Reapply the bandage.  ? You may shower as normal after the first 24 hours. But don t soak the area in water (no baths or swimming) until the stitches are removed.    If surgical tape closures were used, keep the area clean and dry. If the area gets wet, pat it dry with a towel. After the surgical tape closures have been removed, it's safe to go back to your normal activities.    You may use over-the-counter pain medicine to control pain, unless another medicine was given. Talk with your provider before using these medicines if you have long-term (chronic) liver or kidney disease, or ever had a stomach ulcer or GI (gastrointestinal) bleeding.  Follow-up care  Most skin wounds heal in 10 days. But an infection may sometimes occur, even with correct treatment. Check the wound daily for the signs of infection listed below. Stitches should be taken out in 7 to 14 days. You may have  surgical tape closures. If these have not fallen off after 7 days, you can remove them yourself unless you were told otherwise.  When to get medical advice  Call your healthcare provider right away if any of these occur:    Pain in the wound gets worse    Redness, swelling, or pus coming from the wound    Fever of 100.4 F (38 C) or higher, or as directed by your provider    Stitches come apart or fall out, or surgical tape falls off before 5 days    The wound edges reopen    Numb feeling that doesn t go away by the time stitches are removed  Mayo last reviewed this educational content on 8/1/2019 2000-2021 [employer]. All rights reserved. This information is not intended as a substitute for professional medical care. Always follow your healthcare professional's instructions.

## 2021-07-09 NOTE — PROGRESS NOTES
Preceptor Attestation:  I discussed the patient with the resident and evaluated the patient in person. I was present for and supervised the entire procedure. I have verified the content of the note, which accurately reflects my assessment of the patient and the plan of care.  Supervising Physician:  Dari Andrews MD.

## 2021-07-09 NOTE — LETTER
RETURN TO WORK/SCHOOL FORM    7/9/2021    Re: Deonna Honeycutt  1985      To Whom It May Concern:     Deonna Honeycutt was seen in clinic today and received a skin procedure.  He may return to work without restrictions on 7/16/21.       Restrictions:  None full duty      Khai Solares MD  7/9/2021 3:23 PM

## 2021-07-09 NOTE — LETTER
RETURN TO WORK/SCHOOL FORM    7/9/2021    Re: Deonna Honeycutt  1985      To Whom It May Concern:     Deonna Honeycutt was seen in clinic today and received a skin procedure.  He may return to work without restrictions on 7/14/21.       Restrictions:  None full duty      Khai Solares MD  7/9/2021 3:23 PM

## 2021-07-09 NOTE — PROGRESS NOTES
"There are no exam notes on file for this visit.  Chief Complaint   Patient presents with     Procedure      lipoma resection     other     Pt needs doctor note to bring to work for attandence.     Blood pressure 129/88, pulse 75, temperature 98.1  F (36.7  C), temperature source Oral, resp. rate 16, height 1.747 m (5' 8.78\"), weight 80.9 kg (178 lb 6.4 oz), SpO2 99 %.           SUBJECTIVE       Lay is a 35 year old  male with a PMH significant for:     Patient Active Problem List   Diagnosis     Ureterolithiasis      Lay D Daaron is a 35 year old male who presents today for lipoma excision of the right buttock. He has 1 lipoma that he would like removed. They have been changing. They have not become irritated by the lipoma.    A [employer]  was used for this visit.          OBJECTIVE     Vitals:    07/09/21 1515   BP: 129/88   BP Location: Left arm   Patient Position: Sitting   Cuff Size: Adult Regular   Pulse: 75   Resp: 16   Temp: 98.1  F (36.7  C)   TempSrc: Oral   SpO2: 99%   Weight: 80.9 kg (178 lb 6.4 oz)   Height: 1.747 m (5' 8.78\")     Body mass index is 26.51 kg/m .    Constitutional: Awake, alert, cooperative, no acute distress, and appears stated age.  Eyes: sclera clear, conjunctiva normal.  Back: Symmetric, no curvature  Lungs: No increased WOB. CTABL, no crackles or wheezing appreciated.  Cardiovascular: Appears well perfused. RRR, normal S1 and S2, no S3 or S4, and no murmur appreciated.  Abdomen: Normal BS, soft, non-distended, non-tender, no masses palpated  Musculoskeletal: No redness, warmth, or swelling of the joints. Tone is normal.  Neurologic: A&Ox3.  Cranial nerves II-XII are grossly intact.  Sensory is intact and gait is normal.  Neuropsychiatric: Normal affect, mood, orientation, memory and insight.  Skin: No visible rashes, erythema, pallor, petechia or purpura. Exam shows a subcutaneous lipoma which is raised, on the right buttock. It is 1.5 cm in diameter.      No flowsheet data " found.  No flowsheet data found.  No results found for any visits on 07/09/21.    ASSESSMENT AND PLAN     Lay was seen today for procedure and other.    Diagnoses and all orders for this visit:    Lipoma of skin and subcutaneous tissue  -     cephALEXin (KEFLEX) 500 MG capsule; Take 1 capsule (500 mg) by mouth 2 times daily  -     ibuprofen (ADVIL/MOTRIN) 600 MG tablet; Take 1 tablet (600 mg) by mouth every 6 hours as needed for moderate pain  -     trunk, arms, legs    Routine general medical examination at a health care facility  -     Cancel: Hemoglobin A1c ([facility])      Discussed with Lay regarding technique, risk of infection, and scarring. Betadine is used for cleansing. Lidocaine with epinephrine is used for anesthesia. The lipoma were removed with an linear excision with 11 blade, and removed with forceps and medical scissors. 4 sutures of 2-0 monocryl are placed. Bandage applied. Lay tolerated procedure well. No complications.    ASSESSMENT:   Lipoma excision.    PLAN:   Wound care instructions given. Return in 7 days for suture removal.    Return in about 1 week (around 7/16/2021), or Suture Removal.    Khai Solares MD

## 2021-07-12 ENCOUNTER — MEDICAL CORRESPONDENCE (OUTPATIENT)
Dept: HEALTH INFORMATION MANAGEMENT | Facility: CLINIC | Age: 36
End: 2021-07-12

## 2021-07-15 ENCOUNTER — OFFICE VISIT (OUTPATIENT)
Dept: PHARMACY | Facility: CLINIC | Age: 36
End: 2021-07-15
Payer: COMMERCIAL

## 2021-07-15 ENCOUNTER — OFFICE VISIT (OUTPATIENT)
Dept: FAMILY MEDICINE | Facility: CLINIC | Age: 36
End: 2021-07-15
Payer: COMMERCIAL

## 2021-07-15 VITALS
DIASTOLIC BLOOD PRESSURE: 76 MMHG | HEIGHT: 69 IN | TEMPERATURE: 98.7 F | SYSTOLIC BLOOD PRESSURE: 121 MMHG | RESPIRATION RATE: 16 BRPM | WEIGHT: 177.2 LBS | OXYGEN SATURATION: 98 % | HEART RATE: 103 BPM | BODY MASS INDEX: 26.25 KG/M2

## 2021-07-15 DIAGNOSIS — Z79.4 TYPE 2 DIABETES MELLITUS WITH STAGE 3A CHRONIC KIDNEY DISEASE, WITH LONG-TERM CURRENT USE OF INSULIN (H): Primary | ICD-10-CM

## 2021-07-15 DIAGNOSIS — E11.9 TYPE 2 DIABETES MELLITUS WITHOUT COMPLICATION, WITHOUT LONG-TERM CURRENT USE OF INSULIN (H): Primary | ICD-10-CM

## 2021-07-15 DIAGNOSIS — Z13.1 SCREENING FOR DIABETES MELLITUS: Primary | ICD-10-CM

## 2021-07-15 DIAGNOSIS — E11.22 TYPE 2 DIABETES MELLITUS WITH STAGE 3A CHRONIC KIDNEY DISEASE, WITH LONG-TERM CURRENT USE OF INSULIN (H): Primary | ICD-10-CM

## 2021-07-15 DIAGNOSIS — Z13.1 SCREENING FOR DIABETES MELLITUS: ICD-10-CM

## 2021-07-15 DIAGNOSIS — N18.31 TYPE 2 DIABETES MELLITUS WITH STAGE 3A CHRONIC KIDNEY DISEASE, WITH LONG-TERM CURRENT USE OF INSULIN (H): Primary | ICD-10-CM

## 2021-07-15 LAB
CREAT UR-MCNC: 25 MG/DL
HBA1C MFR BLD: >14 %
MICROALBUMIN UR-MCNC: <0.5 MG/DL (ref 0–1.99)
MICROALBUMIN/CREAT UR: NORMAL MG/G{CREAT}

## 2021-07-15 PROCEDURE — 99214 OFFICE O/P EST MOD 30 MIN: CPT | Mod: 24 | Performed by: STUDENT IN AN ORGANIZED HEALTH CARE EDUCATION/TRAINING PROGRAM

## 2021-07-15 PROCEDURE — 36415 COLL VENOUS BLD VENIPUNCTURE: CPT | Performed by: STUDENT IN AN ORGANIZED HEALTH CARE EDUCATION/TRAINING PROGRAM

## 2021-07-15 PROCEDURE — 99207 PR NO CHARGE LOS: CPT | Performed by: PHARMACIST

## 2021-07-15 PROCEDURE — 82043 UR ALBUMIN QUANTITATIVE: CPT | Performed by: STUDENT IN AN ORGANIZED HEALTH CARE EDUCATION/TRAINING PROGRAM

## 2021-07-15 PROCEDURE — 83036 HEMOGLOBIN GLYCOSYLATED A1C: CPT | Performed by: STUDENT IN AN ORGANIZED HEALTH CARE EDUCATION/TRAINING PROGRAM

## 2021-07-15 RX ORDER — METFORMIN HCL 500 MG
500 TABLET, EXTENDED RELEASE 24 HR ORAL
Qty: 30 TABLET | Refills: 3 | Status: CANCELLED | OUTPATIENT
Start: 2021-07-15

## 2021-07-15 RX ORDER — BLOOD-GLUCOSE METER
EACH MISCELLANEOUS
Qty: 1 KIT | Refills: 0 | Status: SHIPPED | OUTPATIENT
Start: 2021-07-15 | End: 2021-07-29

## 2021-07-15 RX ORDER — LANCETS
EACH MISCELLANEOUS
Qty: 100 EACH | Refills: 11 | Status: SHIPPED | OUTPATIENT
Start: 2021-07-15 | End: 2021-11-29

## 2021-07-15 ASSESSMENT — MIFFLIN-ST. JEOR: SCORE: 1739.4

## 2021-07-15 NOTE — NURSING NOTE
Due to patient being non-English speaking/uses sign language, an  was used for this visit. Only for face-to-face interpretation by an external agency, date and length of interpretation can be found on the scanned worksheet.       name: [employer]  Language: KWASI  Agency:  [id number]  Phone number: [phone]  Type of interpretation:  Telephone, spoken       DUSTIN Diallo  12:16 PM  7/15/2021

## 2021-07-15 NOTE — PROGRESS NOTES
"Nursing Notes:   Marlon Martines EMT  7/15/2021 12:16 PM  Signed  Due to patient being non-English speaking/uses sign language, an  was used for this visit. Only for face-to-face interpretation by an external agency, date and length of interpretation can be found on the scanned worksheet.       name: [employer]  Language: KWASI  Agency:  [id number]  Phone number: [phone]  Type of interpretation:  Telephone, spoken       DUSTIN Diallo  12:16 PM  7/15/2021          Chief Complaint   Patient presents with     other     F/U with suture removal     Blood pressure 121/76, pulse 103, temperature 98.7  F (37.1  C), temperature source Oral, resp. rate 16, height 1.753 m (5' 9.02\"), weight 80.4 kg (177 lb 3.2 oz), SpO2 98 %.           SUBJECTIVE       Lay is a 35 year old  male with a PMH significant for:     Patient Active Problem List   Diagnosis     Ureterolithiasis     He presents for suture removal after a lipoma removal a week ago. Denies any discharge or bleeding from the surgical side. Also here to follow up on lab work that was unable to be obtained last time despite multiple attempts. Denies any dysuria, dizziness, headache, fatigue, weakness or any other acute complaints.     Of note has an extensive family history of diabetes, with his father and sister both taking medication for type 2 diabetes mellitus.    A [employer]  was used for this visit.          OBJECTIVE     Vitals:    07/15/21 1107   BP: 121/76   BP Location: Left arm   Patient Position: Sitting   Cuff Size: Adult Regular   Pulse: 103   Resp: 16   Temp: 98.7  F (37.1  C)   TempSrc: Oral   SpO2: 98%   Weight: 80.4 kg (177 lb 3.2 oz)   Height: 1.753 m (5' 9.02\")     Body mass index is 26.16 kg/m .    Constitutional: Awake, alert, cooperative, no acute distress, and appears stated age.  Eyes: sclera clear, conjunctiva normal.  ENT: NC/AT. TM clear bilaterally. Tonsils nonerythematous and without " exudates or trismus.  Neck: Supple, symmetrical, trachea midline. No submandibular LAD.  Back: Symmetric, no curvature  Lungs: No increased WOB. CTABL, no crackles or wheezing appreciated.  Cardiovascular: Appears well perfused. RRR, normal S1 and S2, no S3 or S4, and no murmur appreciated.  Abdomen: Normal BS, soft, non-distended, non-tender, no masses palpated  Musculoskeletal: No redness, warmth, or swelling of the joints. Tone is normal.  Neurologic: A&Ox3.  Cranial nerves II-XII are grossly intact.  Sensory is intact and gait is normal.  Neuropsychiatric: Normal affect, mood, orientation, memory and insight.  Skin: Well-approximated incision of the right buttock, non-erythematous.    No flowsheet data found.  No flowsheet data found.  No results found for any visits on 07/15/21.    ASSESSMENT AND PLAN     Lay was seen today for other.    Diagnoses and all orders for this visit:    Type 2 diabetes mellitus with stage 3a chronic kidney disease, with long-term current use of insulin (H)    Screening for diabetes mellitus  -     Hemoglobin A1c    Other orders  -     Cancel: Basic Metabolic Panel  -     Cancel: Albumin Random Urine Quantitative with Creat Ratio  -     Diabetes Education Referral  -     blood glucose monitoring (ACCU-CHEK CHRISTIE PLUS) meter device kit; Use to test blood sugar 1 times daily or as directed.  -     insulin pen needle (29G X 12.7MM) 29G X 12.7MM miscellaneous; Use 1 pen needles daily or as directed.  -     insulin glargine (LANTUS PEN) 100 UNIT/ML pen; Inject 10 Units Subcutaneous At Bedtime  -     Albumin Random Urine Quantitative with Creat Ratio  -     blood glucose (NO BRAND SPECIFIED) test strip; Use to test blood sugar 1 times daily or as directed.  -     Basic Metabolic Panel; Future     4 sutures were removed via scissors and forceps without complications. Patient was screened for diabetes with an A1c that was elevated beyond machine detection, was sent out for further evaluation.  Pharm D. Was present to help with patient education regarding the appropriate use of insulin needles and blood glucose checks. Recommended to take fasting glucose checks in the AM prior to meals, record and bring back to clinic in 2 weeks. Cover with 10 U of lantus at bedtime and get plugged in with diabetes educator. Consider SGLT2 and ACEi to start at next visit pending Cr and urine studies.    Return in about 2 weeks (around 7/29/2021).    Khai Solares MD  7/15/2021    Precepted with Dr. Briceno

## 2021-07-15 NOTE — PROGRESS NOTES
Clinical Pharmacy Consult:                                                    Deonna Honeycutt is a 35 year old male seen for a clinical pharmacist consult.  He was referred to me from Dr Solares.     Reason for Consult: Diabetes Education    Discussion: Patient was referred to me by Dr. Solares for diabetes education.  Patient is a new diagnosis of diabetes today.  Specific education was for starting Lantus as well as glucometer teaching.  Went over Lantus education with patient including priming, dose, administration, and storage.  Patient was able to adequately demonstrate how to use the Lantus pen with a demo pen.  Did not go over glucometer education with patient as he did not have a glucometer with him today.  He stated that he should be able to figure it out as his wife uses a glucometer with her father.  Did go over blood glucose goals with patient as well as gave him the blood sugar log today to write down his readings.  Patient reported no further questions.    Plan:  1. Continue current meds.   2. Pt is scheduled for follow up in 2 weeks with Dr. Solares.  I will blue dot patient as he could benefit from full MTM visit.     Asia Mosqueda, Pharm.D.

## 2021-07-16 ENCOUNTER — TELEPHONE (OUTPATIENT)
Dept: FAMILY MEDICINE | Facility: CLINIC | Age: 36
End: 2021-07-16

## 2021-07-16 NOTE — TELEPHONE ENCOUNTER
Diabetes Education Scheduling Outreach #1:    Call to patient to schedule. Patient declined to schedule.    Ro Garza  [place] [facility]  Diabetes and Nutrition Scheduling

## 2021-07-19 NOTE — PROGRESS NOTES
Preceptor Attestation:    I discussed the patient with the resident and evaluated the patient in person. I have verified the content of the note, which accurately reflects my assessment of the patient and the plan of care.   Supervising Physician:  Chuy Briceno MD.

## 2021-07-29 ENCOUNTER — OFFICE VISIT (OUTPATIENT)
Dept: FAMILY MEDICINE | Facility: CLINIC | Age: 36
End: 2021-07-29
Payer: COMMERCIAL

## 2021-07-29 ENCOUNTER — OFFICE VISIT (OUTPATIENT)
Dept: PHARMACY | Facility: CLINIC | Age: 36
End: 2021-07-29
Payer: COMMERCIAL

## 2021-07-29 VITALS
WEIGHT: 176.6 LBS | OXYGEN SATURATION: 99 % | BODY MASS INDEX: 26.16 KG/M2 | DIASTOLIC BLOOD PRESSURE: 76 MMHG | SYSTOLIC BLOOD PRESSURE: 115 MMHG | RESPIRATION RATE: 18 BRPM | HEIGHT: 69 IN | HEART RATE: 57 BPM | TEMPERATURE: 98.3 F

## 2021-07-29 DIAGNOSIS — Z79.4 TYPE 2 DIABETES MELLITUS WITH STAGE 3A CHRONIC KIDNEY DISEASE, WITH LONG-TERM CURRENT USE OF INSULIN (H): Primary | ICD-10-CM

## 2021-07-29 DIAGNOSIS — N18.31 TYPE 2 DIABETES MELLITUS WITH STAGE 3A CHRONIC KIDNEY DISEASE, WITH LONG-TERM CURRENT USE OF INSULIN (H): Primary | ICD-10-CM

## 2021-07-29 DIAGNOSIS — E11.22 TYPE 2 DIABETES MELLITUS WITH STAGE 3A CHRONIC KIDNEY DISEASE, WITH LONG-TERM CURRENT USE OF INSULIN (H): Primary | ICD-10-CM

## 2021-07-29 DIAGNOSIS — E11.9 TYPE 2 DIABETES MELLITUS WITHOUT COMPLICATION, WITHOUT LONG-TERM CURRENT USE OF INSULIN (H): Primary | ICD-10-CM

## 2021-07-29 LAB
ANION GAP SERPL CALCULATED.3IONS-SCNC: 9 MMOL/L (ref 5–18)
BUN SERPL-MCNC: 11 MG/DL (ref 8–22)
CALCIUM SERPL-MCNC: 9 MG/DL (ref 8.5–10.5)
CHLORIDE BLD-SCNC: 105 MMOL/L (ref 98–107)
CO2 SERPL-SCNC: 26 MMOL/L (ref 22–31)
CREAT SERPL-MCNC: 0.95 MG/DL (ref 0.7–1.3)
GFR SERPL CREATININE-BSD FRML MDRD: >90 ML/MIN/1.73M2
GLUCOSE BLD-MCNC: 167 MG/DL (ref 70–125)
POTASSIUM BLD-SCNC: 4.1 MMOL/L (ref 3.5–5)
SODIUM SERPL-SCNC: 140 MMOL/L (ref 136–145)

## 2021-07-29 PROCEDURE — 99607 MTMS BY PHARM ADDL 15 MIN: CPT | Performed by: PHARMACIST

## 2021-07-29 PROCEDURE — 99214 OFFICE O/P EST MOD 30 MIN: CPT | Mod: GC | Performed by: STUDENT IN AN ORGANIZED HEALTH CARE EDUCATION/TRAINING PROGRAM

## 2021-07-29 PROCEDURE — T1013 SIGN LANG/ORAL INTERPRETER: HCPCS | Performed by: STUDENT IN AN ORGANIZED HEALTH CARE EDUCATION/TRAINING PROGRAM

## 2021-07-29 PROCEDURE — 36415 COLL VENOUS BLD VENIPUNCTURE: CPT | Performed by: STUDENT IN AN ORGANIZED HEALTH CARE EDUCATION/TRAINING PROGRAM

## 2021-07-29 PROCEDURE — 99605 MTMS BY PHARM NP 15 MIN: CPT | Performed by: PHARMACIST

## 2021-07-29 PROCEDURE — 80048 BASIC METABOLIC PNL TOTAL CA: CPT | Performed by: STUDENT IN AN ORGANIZED HEALTH CARE EDUCATION/TRAINING PROGRAM

## 2021-07-29 RX ORDER — FLURBIPROFEN SODIUM 0.3 MG/ML
SOLUTION/ DROPS OPHTHALMIC
Qty: 100 EACH | Refills: 11 | Status: SHIPPED | OUTPATIENT
Start: 2021-07-29 | End: 2021-08-12

## 2021-07-29 RX ORDER — METFORMIN HCL 500 MG
500 TABLET, EXTENDED RELEASE 24 HR ORAL
Qty: 60 TABLET | Refills: 3 | Status: SHIPPED | OUTPATIENT
Start: 2021-07-29 | End: 2021-08-12

## 2021-07-29 ASSESSMENT — MIFFLIN-ST. JEOR: SCORE: 1729.81

## 2021-07-29 NOTE — PROGRESS NOTES
Medication Therapy Management (MTM) Encounter    ASSESSMENT:                            Medication Adherence/Access: No issues identified    Type 2 diabetes: Not at hemoglobin A1c goal of less than 7% per A1c at last visit.  SMBG's are considerably high yet likely improved with the addition of Lantus.  Patient could benefit from the addition of Metformin, and an increased dosage of Lantus.  Patient does not need aspirin or statin at this time based on his age.  His LDL was a little bit elevated at the last visit so I would recommend to continue to watch that at this time.  He does not need an ACE inhibitor as he does not have hypertension or microalbuminuria.    PLAN:                              Increase Lantus dose to 16 units at noon    Start Metformin  mg once daily for 1 week then increase to 1000 mg daily.    Follow-up: Approximately 2 weeks with Dr Solares.    SUBJECTIVE/OBJECTIVE:                          Deonna Honeycutt is a 35 year old male seen for an initial visit. He was referred to me from Dr Solares. Patient is seeing provider after our visit today. [employer]  Cynthia (ID# [id number]).      Reason for visit: Diabetes.    Allergies/ADRs: Reviewed in chart  Past Medical History: Reviewed in chart  Tobacco: He reports that he has never smoked. He has never used smokeless tobacco.    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    Type 2 Diabetes:  Currently taking Lantus 10 units daily.  Patient denies side effects.  Patient is testing his blood sugars twice daily; in the morning when he wakes up before food and after work around 11 PM/12 PM.  His morning blood  "pressure sugars are about 190-230 and his evening blood sugars are in the low 200s up to 270.  Of note, I get the impression from the patient that he has not quite progressed that diabetes is an indefinite diagnosis for him.  Symptoms of low blood sugar? none  Symptoms of high blood sugar? none  Aspirin: Not taking due to age less than 40  Statin: No; not needed due to age less than 40.  ACEi/ARB: No; not needed due to normal blood pressure and absence of microalbuminuria  Urine Albumin: No results found for: UMALCR   Lab Results   Component Value Date    A1C >14.0 07/15/2021       Today's Vitals:   BP Readings from Last 1 Encounters:   07/29/21 115/76     Pulse Readings from Last 1 Encounters:   07/29/21 57     Wt Readings from Last 1 Encounters:   07/29/21 176 lb 9.6 oz (80.1 kg)     Ht Readings from Last 1 Encounters:   07/29/21 5' 8.58\" (1.742 m)     Estimated body mass index is 26.4 kg/m  as calculated from the following:    Height as of an earlier encounter on 7/29/21: 5' 8.58\" (1.742 m).    Weight as of an earlier encounter on 7/29/21: 176 lb 9.6 oz (80.1 kg).    Temp Readings from Last 1 Encounters:   07/29/21 98.3  F (36.8  C) (Oral)       ----------------      I spent 20 minutes with this patient today. Dr. Solares was provided the recommendations above  in clinic today and Dr. Camacho is the authorizing prescriber for this visit through the pharmacist collaborative practice agreement.. A copy of the visit note was NOT provided to the patient's primary care provider as it was discussed with him in person today.    The patient was given a summary of these recommendations. See Provider note/AVS from today.     Hiren CanadaD.     Medication Therapy Recommendations  Type 2 diabetes mellitus without complication, without long-term current use of insulin (H)    Current Medication: insulin glargine (LANTUS PEN) 100 UNIT/ML pen   Rationale: Dose too low - Dosage too low - Effectiveness   Recommendation: " Increase Dose   Status: Accepted per Provider          Current Medication: metFORMIN (GLUCOPHAGE-XR) 500 MG 24 hr tablet   Rationale: Synergistic therapy - Needs additional medication therapy - Indication   Recommendation: Start Medication   Status: Accepted per Provider

## 2021-07-29 NOTE — PROGRESS NOTES
"Nursing Notes:   Yinka Walker  7/29/2021 12:59 PM  Signed  Due to patient being non-English speaking/uses sign language, an  was used for this visit. Only for face-to-face interpretation by an external agency, date and length of interpretation can be found on the scanned worksheet.     name: NATHANIEL   Agency: [employer]  Language: Bell   Telephone number: [phone]  Type of interpretation: Telephone, spoken        Chief Complaint   Patient presents with     RECHECK     follow up on DM per pt      Blood pressure 115/76, pulse 57, temperature 98.3  F (36.8  C), temperature source Oral, resp. rate 18, height 1.742 m (5' 8.58\"), weight 80.1 kg (176 lb 9.6 oz), SpO2 99 %.           SUBJECTIVE       Lay is a 35 year old  male with a PMH significant for:     Patient Active Problem List   Diagnosis     Ureterolithiasis     He presents for follow up of his T2DM, was started on 10 U of lantus on 7/15/21 for basal coverage. Since then he has been checking his BGs twice a day, in the morning his fasting glucose have been in the 220s-260s. Never had any symptoms of polyuria or polydipsia, or abdominal pain, nausea or vomiting. No neuropathy, no tingling, no numbness. Otherwise has no complaints today.    A Bell  was used for this visit.          OBJECTIVE     Vitals:    07/29/21 1245   BP: 115/76   BP Location: Left arm   Patient Position: Sitting   Cuff Size: Adult Small   Pulse: 57   Resp: 18   Temp: 98.3  F (36.8  C)   TempSrc: Oral   SpO2: 99%   Weight: 80.1 kg (176 lb 9.6 oz)   Height: 1.742 m (5' 8.58\")     Body mass index is 26.4 kg/m .    Constitutional: Awake, alert, cooperative, no acute distress, and appears stated age.  Eyes: sclera clear, conjunctiva normal.  Neck: Supple, symmetrical, trachea midline. No submandibular LAD.  Back: Symmetric, no curvature  Lungs: No increased WOB. CTABL, no crackles or wheezing appreciated.  Cardiovascular: Appears well perfused. RRR, normal S1 and S2, no " S3 or S4, and no murmur appreciated.  Abdomen: Normal BS, soft, non-distended, non-tender, no masses palpated  Musculoskeletal: Tone is normal.  Neurologic: A&Ox3.  Cranial nerves II-XII are grossly intact.  Sensory is intact and gait is normal.  Neuropsychiatric: Normal affect, mood, orientation, memory and insight.  Skin: No visible rashes, erythema, pallor, petechia or purpura.  Foot Exam: No cuts, scrapes or open sores. Monofilament test was normal.    No results found for any visits on 07/29/21.    ASSESSMENT AND PLAN     Lay was seen today for recheck.    Diagnoses and all orders for this visit:    Type 2 diabetes mellitus with stage 3a chronic kidney disease, with long-term current use of insulin (H)  -     metFORMIN (GLUCOPHAGE-XR) 500 MG 24 hr tablet; Take 1 tablet (500 mg) by mouth daily (with dinner)  -     insulin glargine (LANTUS PEN) 100 UNIT/ML pen; Inject 16 Units Subcutaneous At Bedtime    Other orders  -     Basic Metabolic Panel    Increase Lantus to 16 units at noon for increased basal coverage, doesn't have microalbuminuria or hypertension. Will check a BMP today to check kidney function.    Start metformin ER     Take 500mg daily (1 tablet) for 1 week    Then increase to 1000mg daily (2 tablets)       Return in about 2 weeks (around 8/12/2021).    Khai Solares MD  7/29/2021  Precepted today with Dr. Anders

## 2021-07-29 NOTE — NURSING NOTE
Due to patient being non-English speaking/uses sign language, an  was used for this visit. Only for face-to-face interpretation by an external agency, date and length of interpretation can be found on the scanned worksheet.     name: NATHANIEL   Agency: [employer]  Language: Bell   Telephone number: [phone]  Type of interpretation: Telephone, spoken

## 2021-07-29 NOTE — PROGRESS NOTES
Preceptor Attestation:   Patient seen, evaluated and discussed with the resident. I have verified the content of the note, which accurately reflects my assessment of the patient and the plan of care.   Supervising Physician:  Bharath Anders MD

## 2021-07-29 NOTE — PATIENT INSTRUCTIONS
Increase Lantus to 16 units at noon    Start metformin ER     Take 500mg daily (1 tablet) for 1 week    Then increase to 1000mg daily (2 tablets)     Come back to see Dr. Solares in 2 weeks

## 2021-08-04 DIAGNOSIS — Z79.4 TYPE 2 DIABETES MELLITUS WITH STAGE 3A CHRONIC KIDNEY DISEASE, WITH LONG-TERM CURRENT USE OF INSULIN (H): Primary | ICD-10-CM

## 2021-08-04 DIAGNOSIS — N18.31 TYPE 2 DIABETES MELLITUS WITH STAGE 3A CHRONIC KIDNEY DISEASE, WITH LONG-TERM CURRENT USE OF INSULIN (H): Primary | ICD-10-CM

## 2021-08-04 DIAGNOSIS — E11.22 TYPE 2 DIABETES MELLITUS WITH STAGE 3A CHRONIC KIDNEY DISEASE, WITH LONG-TERM CURRENT USE OF INSULIN (H): Primary | ICD-10-CM

## 2021-08-04 RX ORDER — BLOOD SUGAR DIAGNOSTIC
STRIP MISCELLANEOUS
Qty: 400 STRIP | Refills: 1 | Status: SHIPPED | OUTPATIENT
Start: 2021-08-04 | End: 2022-08-16

## 2021-08-12 ENCOUNTER — OFFICE VISIT (OUTPATIENT)
Dept: FAMILY MEDICINE | Facility: CLINIC | Age: 36
End: 2021-08-12
Payer: COMMERCIAL

## 2021-08-12 VITALS
DIASTOLIC BLOOD PRESSURE: 81 MMHG | RESPIRATION RATE: 16 BRPM | OXYGEN SATURATION: 100 % | SYSTOLIC BLOOD PRESSURE: 119 MMHG | WEIGHT: 178 LBS | BODY MASS INDEX: 26.61 KG/M2 | TEMPERATURE: 98.2 F | HEART RATE: 63 BPM

## 2021-08-12 DIAGNOSIS — Z79.4 TYPE 2 DIABETES MELLITUS WITH STAGE 3A CHRONIC KIDNEY DISEASE, WITH LONG-TERM CURRENT USE OF INSULIN (H): ICD-10-CM

## 2021-08-12 DIAGNOSIS — E11.9 TYPE 2 DIABETES MELLITUS WITHOUT COMPLICATION, WITHOUT LONG-TERM CURRENT USE OF INSULIN (H): ICD-10-CM

## 2021-08-12 DIAGNOSIS — E11.22 TYPE 2 DIABETES MELLITUS WITH STAGE 3A CHRONIC KIDNEY DISEASE, WITH LONG-TERM CURRENT USE OF INSULIN (H): ICD-10-CM

## 2021-08-12 DIAGNOSIS — N18.31 TYPE 2 DIABETES MELLITUS WITH STAGE 3A CHRONIC KIDNEY DISEASE, WITH LONG-TERM CURRENT USE OF INSULIN (H): ICD-10-CM

## 2021-08-12 PROCEDURE — 86341 ISLET CELL ANTIBODY: CPT | Mod: 90 | Performed by: STUDENT IN AN ORGANIZED HEALTH CARE EDUCATION/TRAINING PROGRAM

## 2021-08-12 PROCEDURE — 99000 SPECIMEN HANDLING OFFICE-LAB: CPT | Performed by: STUDENT IN AN ORGANIZED HEALTH CARE EDUCATION/TRAINING PROGRAM

## 2021-08-12 PROCEDURE — 99214 OFFICE O/P EST MOD 30 MIN: CPT | Mod: GC | Performed by: STUDENT IN AN ORGANIZED HEALTH CARE EDUCATION/TRAINING PROGRAM

## 2021-08-12 PROCEDURE — 84681 ASSAY OF C-PEPTIDE: CPT | Performed by: STUDENT IN AN ORGANIZED HEALTH CARE EDUCATION/TRAINING PROGRAM

## 2021-08-12 PROCEDURE — 36415 COLL VENOUS BLD VENIPUNCTURE: CPT | Performed by: STUDENT IN AN ORGANIZED HEALTH CARE EDUCATION/TRAINING PROGRAM

## 2021-08-12 RX ORDER — METFORMIN HCL 500 MG
2000 TABLET, EXTENDED RELEASE 24 HR ORAL
Qty: 120 TABLET | Refills: 3 | Status: SHIPPED | OUTPATIENT
Start: 2021-08-12 | End: 2022-08-16

## 2021-08-12 RX ORDER — FLURBIPROFEN SODIUM 0.3 MG/ML
SOLUTION/ DROPS OPHTHALMIC
Qty: 100 EACH | Refills: 11 | Status: SHIPPED | OUTPATIENT
Start: 2021-08-12 | End: 2021-11-29

## 2021-08-12 RX ORDER — METFORMIN HCL 500 MG
500 TABLET, EXTENDED RELEASE 24 HR ORAL
Qty: 120 TABLET | Refills: 3 | Status: SHIPPED | OUTPATIENT
Start: 2021-08-12 | End: 2021-08-12

## 2021-08-12 NOTE — PROGRESS NOTES
Preceptor attestation:  Vital signs reviewed: /81 (BP Location: Left arm, Patient Position: Sitting, Cuff Size: Adult Regular)   Pulse 63   Temp 98.2  F (36.8  C) (Oral)   Resp 16   Wt 80.7 kg (178 lb)   SpO2 100%   BMI 26.61 kg/m      Patient seen, evaluated, and discussed with the resident.  I have verified the content of the note, which accurately reflects my assessment of the patient and the plan of care.    Supervising physician: Shila Bowens MD  [facility]

## 2021-08-12 NOTE — NURSING NOTE
Due to patient being non-English speaking/uses sign language, an  was used for this visit. Only for face-to-face interpretation by an external agency, date and length of interpretation can be found on the scanned worksheet.     name: Shaq Hamilton  Agency: [employer]  Language: Bell   Telephone number: [phone]  Type of interpretation: Face-to-face, spoken

## 2021-08-12 NOTE — PATIENT INSTRUCTIONS
Patient Education     Diabetes: Exams and Tests    For your diabetes care, you may see your primary care provider or a specialist 2 to 4 times a year. This page lists some of the regular exams and tests advised for people with diabetes. To learn more, contact the American Diabetes Association (205-795-6876 or www.diabetes.org).  Tests and vaccines  These should be done at least as often as stated below:    Blood pressure check. Every healthcare provider visit.    A1C. At first, every 3 months. If controlled, then every 3 to 6 months.     Cholesterol and blood lipid tests. At least every 12 months.    Urine tests for kidney function. Every 12 months.    Flu shots. Once a year.    Pneumonia shot. Ask your provider which pneumonia vaccines are right for you.    Hepatitis B shots. As soon as possible if you re younger than 60. Or as advised by your healthcare provider after age 60.    Shingles vaccine. At age 50. Get the shots even if you have had shingles. Or if you had a past shingles vaccine.    Other tests or vaccines. As advised by your provider.    Individualized medical nutrition therapy. At least once. Then as needed.    Stop smoking counseling. If you still smoke, at each visit.   Regular exams  The following exams help keep you healthy.  Foot exams  Nerve and blood vessel problems can affect your feet first. Have your healthcare provider check your feet at every office visit. Take your shoes and socks off in the exam room. This will help remind you to get your feet checked. Also check your feet at home every day. Look for pressure sores or injuries. Contact your provider if you see problems.  Eye exams  You can have problems with your eyes even if you don t have trouble seeing. An eye healthcare provider (ophthalmologist) or specially trained optometrist will give you a dilated eye exam at least once a year. Tell your healthcare provider right away if you:    See dark spots    Don't see well in dim  light    Have eye pain or pressure    Have any other problems  Dental exams  Gum disease (also called periodontal disease) and other mouth problems are common in people with diabetes. To help prevent these problems, see your dentist 2 or more times a year. Tell your dentist that you have diabetes.  Ask your healthcare provider what other exams you ll need on a regular basis.  [employer] last reviewed this educational content on 12/1/2018 2000-2021 [employer]. All rights reserved. This information is not intended as a substitute for professional medical care. Always follow your healthcare professional's instructions.

## 2021-08-12 NOTE — PROGRESS NOTES
Nursing Notes:   AyersJovana albrechtJAVON  8/12/2021 12:39 PM  Signed  Due to patient being non-English speaking/uses sign language, an  was used for this visit. Only for face-to-face interpretation by an external agency, date and length of interpretation can be found on the scanned worksheet.     name: Shaq Hamilton  Agency: [employer]  Language: Bell   Telephone number: [phone]  Type of interpretation: Face-to-face, spoken      Chief Complaint   Patient presents with     Diabetes     DM/ needs refill on needles and lantus     Blood pressure 119/81, pulse 63, temperature 98.2  F (36.8  C), temperature source Oral, resp. rate 16, weight 80.7 kg (178 lb), SpO2 100 %.           SUBJECTIVE       Lay is a 35 year old  male with a PMH significant for recent diagnosis of diabetes mellitus.     Patient Active Problem List   Diagnosis     Ureterolithiasis     Diabetes   Patient has not had any new symptoms of diabetes, has not noticed urinary frequency or increased thirst. Denies any  He tries to walk to work and go to the park with his children on weekends. He is also working to avoid sugary drinks and eat healthier. Fasting BG values have been ~140.     Diabetes Follow-up    Patient is checking blood sugars: twice daily.    Blood sugar testing frequency justification: On insulin, frequency appropriate  and Patient modifying lifestyle changes (diet, exercise) with blood sugars  Results are as follows:         -Last A1C was   Lab Results   Component Value Date    A1C >14.0 07/15/2021        Diabetic concerns: None    Chest Pain or exercise related calf pain (claudication):no     Symptoms of hypoglycemia (low blood sugar): none     Paresthesias (numbness or burning in feet) or sores: No     Diabetic eye exam within the last year?: No      Adherence and Exercise  Medication side effects: no  How often is a medication missed? Never  Exercise:walking  4-5 days/week for an average of 30-45 minutes       A Bell   was used for this visit.          OBJECTIVE     Vitals:    08/12/21 1238   BP: 119/81   BP Location: Left arm   Patient Position: Sitting   Cuff Size: Adult Regular   Pulse: 63   Resp: 16   Temp: 98.2  F (36.8  C)   TempSrc: Oral   SpO2: 100%   Weight: 80.7 kg (178 lb)     Body mass index is 26.61 kg/m .    Constitutional: Awake, alert, cooperative, no acute distress, and appears stated age.  Eyes: sclera clear, conjunctiva normal.  Back: Symmetric, no curvature  Lungs: No increased WOB. CTABL, no crackles or wheezing appreciated.  Cardiovascular: Appears well perfused. RRR, normal S1 and S2, no S3 or S4, and no murmur appreciated.  Abdomen: Normal BS, soft, non-distended, non-tender, no masses palpated  Musculoskeletal: No redness, warmth, or swelling of the joints. Tone is normal.  Neurologic: A&Ox3.  Cranial nerves II-XII are grossly intact.  Sensory is intact and gait is normal.  Neuropsychiatric: Normal affect, mood, orientation, memory and insight.  Skin: No visible rashes, erythema, pallor, petechia or purpura.    No results found for any visits on 08/12/21.    ASSESSMENT AND PLAN     Lay D Yinka is a 35 year old male with pmh ureterolithiasis and diabetes mellitus who was seen today for diabetes follow-up.     Diagnoses and all orders for this visit:    Type 2 diabetes mellitus without complication, without long-term current use of insulin (H)  -     insulin glargine (LANTUS PEN) 100 UNIT/ML pen; Inject 16 Units Subcutaneous At Bedtime  -     Increase metFORMIN (GLUCOPHAGE-XR) 500 MG 24 hr tablet; Take 1 tablet (500 mg) by mouth daily (with dinner)    Continue metformin ER     Take 1500mg daily (3 tablet) for 1 week split into am and pm    Then increase to 2000mg daily (4 tablets) split into am and pm    Type 2 diabetes mellitus with stage 3a chronic kidney disease, with long-term current use of insulin (H)  Patient has not experienced any symptoms or side effects, is tolerating current diabetes  regimen well. Physical exam was unremarkable, last A1c was 14 on 7/15/21. Patient is young and otherwise healthy and does not have a strong family history of diabetes or having an unbalanced diet. It is difficult to discern which type of diabetes he has, further evaluation is required will follow up with a C-peptide and CHRIS   -     insulin glargine (LANTUS PEN) 100 UNIT/ML pen; Inject 16 Units Subcutaneous At Bedtime  -     metFORMIN (GLUCOPHAGE-XR) 500 MG 24 hr tablet; Take 1 tablet (500 mg) by mouth daily (with dinner)  - insulin pen needle (B-D U/F) 31G X 5 MM miscellaneous; Use 1 daily as directed.  -     C-peptide  -     Glutamic Acid Decarboxylase CSF:      Return in about 3 months (around 11/12/2021), or Diabetes, for Follow up.    Génesis Henry MS4    Resident/Fellow Attestation   I, Khai Solares, was present with the medical/TITO student who participated in the service and in the documentation of the note.  I have verified the history and personally performed the physical exam and medical decision making.  I agree with the assessment and plan of care as documented in the note.      Khai Solares MD  PGY2    Precepted with Dr. Harris

## 2021-08-13 LAB — C PEPTIDE SERPL-MCNC: 1.3 NG/ML (ref 0.9–6.9)

## 2021-08-17 LAB — GAD65 AB SER IA-ACNC: <5 IU/ML

## 2021-09-01 ENCOUNTER — TELEPHONE (OUTPATIENT)
Dept: FAMILY MEDICINE | Facility: CLINIC | Age: 36
End: 2021-09-01
Payer: COMMERCIAL

## 2021-09-08 DIAGNOSIS — Z79.4 TYPE 2 DIABETES MELLITUS WITH STAGE 3A CHRONIC KIDNEY DISEASE, WITH LONG-TERM CURRENT USE OF INSULIN (H): Primary | ICD-10-CM

## 2021-09-08 DIAGNOSIS — N18.31 TYPE 2 DIABETES MELLITUS WITH STAGE 3A CHRONIC KIDNEY DISEASE, WITH LONG-TERM CURRENT USE OF INSULIN (H): Primary | ICD-10-CM

## 2021-09-08 DIAGNOSIS — E11.22 TYPE 2 DIABETES MELLITUS WITH STAGE 3A CHRONIC KIDNEY DISEASE, WITH LONG-TERM CURRENT USE OF INSULIN (H): Primary | ICD-10-CM

## 2021-11-29 ENCOUNTER — OFFICE VISIT (OUTPATIENT)
Dept: FAMILY MEDICINE | Facility: CLINIC | Age: 36
End: 2021-11-29
Payer: COMMERCIAL

## 2021-11-29 VITALS
HEIGHT: 69 IN | DIASTOLIC BLOOD PRESSURE: 81 MMHG | OXYGEN SATURATION: 99 % | WEIGHT: 179 LBS | BODY MASS INDEX: 26.51 KG/M2 | TEMPERATURE: 98.4 F | RESPIRATION RATE: 18 BRPM | SYSTOLIC BLOOD PRESSURE: 121 MMHG | HEART RATE: 73 BPM

## 2021-11-29 DIAGNOSIS — E11.22 TYPE 2 DIABETES MELLITUS WITH STAGE 3A CHRONIC KIDNEY DISEASE, WITH LONG-TERM CURRENT USE OF INSULIN (H): Primary | ICD-10-CM

## 2021-11-29 DIAGNOSIS — N18.31 TYPE 2 DIABETES MELLITUS WITH STAGE 3A CHRONIC KIDNEY DISEASE, WITH LONG-TERM CURRENT USE OF INSULIN (H): Primary | ICD-10-CM

## 2021-11-29 DIAGNOSIS — E11.9 TYPE 2 DIABETES MELLITUS WITHOUT COMPLICATION, WITHOUT LONG-TERM CURRENT USE OF INSULIN (H): ICD-10-CM

## 2021-11-29 DIAGNOSIS — Z79.4 TYPE 2 DIABETES MELLITUS WITH STAGE 3A CHRONIC KIDNEY DISEASE, WITH LONG-TERM CURRENT USE OF INSULIN (H): Primary | ICD-10-CM

## 2021-11-29 LAB — HBA1C MFR BLD: 8.3 % (ref 0–5.6)

## 2021-11-29 PROCEDURE — 36415 COLL VENOUS BLD VENIPUNCTURE: CPT | Performed by: STUDENT IN AN ORGANIZED HEALTH CARE EDUCATION/TRAINING PROGRAM

## 2021-11-29 PROCEDURE — 83036 HEMOGLOBIN GLYCOSYLATED A1C: CPT | Performed by: STUDENT IN AN ORGANIZED HEALTH CARE EDUCATION/TRAINING PROGRAM

## 2021-11-29 PROCEDURE — T1013 SIGN LANG/ORAL INTERPRETER: HCPCS | Performed by: STUDENT IN AN ORGANIZED HEALTH CARE EDUCATION/TRAINING PROGRAM

## 2021-11-29 PROCEDURE — 99213 OFFICE O/P EST LOW 20 MIN: CPT | Mod: GC | Performed by: STUDENT IN AN ORGANIZED HEALTH CARE EDUCATION/TRAINING PROGRAM

## 2021-11-29 RX ORDER — LANCETS
EACH MISCELLANEOUS
Qty: 100 EACH | Refills: 11 | Status: SHIPPED | OUTPATIENT
Start: 2021-11-29 | End: 2022-08-16

## 2021-11-29 RX ORDER — FLURBIPROFEN SODIUM 0.3 MG/ML
SOLUTION/ DROPS OPHTHALMIC
Qty: 100 EACH | Refills: 11 | Status: SHIPPED | OUTPATIENT
Start: 2021-11-29 | End: 2022-08-16

## 2021-11-29 ASSESSMENT — MIFFLIN-ST. JEOR: SCORE: 1743.35

## 2021-11-29 NOTE — PATIENT INSTRUCTIONS
Patient Education     Diabetes: Understanding Carbohydrates, Fats, and Protein  Food is a source of fuel and nourishment for your body. It s also a source of pleasure. Having diabetes doesn t mean you have to eat special foods or give up desserts. Instead, your dietitian can show you how to plan meals to suit your body. To start, learn how different foods affect blood sugar.  Carbohydrates  Carbohydrates (carbs) are the main source of fuel for the body. They raise blood sugar. Many people think carbohydrates are only in pasta or bread. But carbohydrates are in many kinds of foods. Carbs include:    Sugars.These are naturally in foods such as fruit, milk, honey, and molasses. Sugars can also be added to many foods. They may be added to cereals and yogurt to candy and desserts. Sugars raise blood sugar.    Starches. These are in bread, cereals, pasta, and dried beans. They re found in corn, peas, potatoes, yam, acorn squash, and butternut squash. Starches raise blood sugar.     Fiber. This is in foods such as vegetables, fruits, beans, and whole grains. Unlike other carbs, fiber isn t digested or absorbed. So it doesn t raise blood sugar. In fact, fiber can help keep blood sugar from rising too fast. It helps keep blood cholesterol at a healthy level.  Did you know?  Even though carbohydrates raise blood sugar, it s best to have some in every meal. They are an important part of a healthy diet.  Fat  Fat is an energy source that can be stored until needed. Fat does not raise blood sugar. But it can raise blood cholesterol. This increases the risk of heart disease. Fat is high in calories. Eating too many calories can cause weight gain. Not all types of fat are the same.  More healthy:    Monounsaturated fats. These are mostly found in vegetable oils, such as olive, canola, and peanut oils. They are found in avocados and some nuts. Monounsaturated fats are healthy for your heart. That s because they lower LDL  "(unhealthy) cholesterol.    Polyunsaturated fats.These are mostly found in vegetable oils, such as corn, safflower, and soybean oils. They are found in some seeds, nuts, and fish. Polyunsaturated fats lower LDL (unhealthy) cholesterol. So, choosing them instead of saturated fats is healthy for your heart. Certain unsaturated fats can help lower triglycerides.   Less healthy:    Saturated fats.These are found in animal products, such as meat, poultry, whole milk, lard, and butter. Saturated fats raise LDL cholesterol.They are not healthy for your heart.    Hydrogenated oilsand trans fats. These are formed when vegetable oils are processed into solid fats. They are found in many processed foods. Hydrogenated oils and trans fats raise LDL (\"bad\") cholesterol and lower HDL (\"good\") cholesterol. They are not healthy for your heart.  Protein  Protein helps the body build and repair muscle and other tissue. Protein has little or no effect on blood sugar. But many foods that have protein also have saturated fat. By choosing low-fat protein sources, you can get the benefits of protein without the extra fat:    Plant protein. This is found in dry beans and peas, nuts, and soy products, such as tofu and soymilk. These foods tend to have no cholesterol.Most are low in saturated fat.    Animal protein. This is found in fish, poultry, meat, cheese, milk, and eggs. These foods have cholesterol.They can be high in saturated fat. Aim for lean, lower-fat choices. Don't eat fried foods.  [employer] last reviewed this educational content on 4/1/2019 2000-2021 [employer]. All rights reserved. This information is not intended as a substitute for professional medical care. Always follow your healthcare professional's instructions.           "

## 2021-11-29 NOTE — PROGRESS NOTES
"Preceptor attestation:  Vital signs reviewed: /81 (BP Location: Right arm, Patient Position: Sitting, Cuff Size: Adult Regular)   Pulse 73   Temp 98.4  F (36.9  C) (Tympanic)   Resp 18   Ht 1.746 m (5' 8.75\")   Wt 81.2 kg (179 lb)   SpO2 99%   BMI 26.63 kg/m      Patient seen, evaluated, and discussed with the resident.  I have verified the content of the note, which accurately reflects my assessment of the patient and the plan of care.    Supervising physician: Shila Bowens MD  [facility]    "

## 2021-11-29 NOTE — NURSING NOTE
Due to patient being non-English speaking/uses sign language, an  was used for this visit. Only for face-to-face interpretation by an external agency, date and length of interpretation can be found on the scanned worksheet.       name: Carter Sánchez (Htoo)  Language: Bell  Agency:  [employer]  Phone number: [phone]  Type of interpretation:  Face-to-face, spoken

## 2021-11-29 NOTE — PROGRESS NOTES
"Nursing Notes:   Brenda Cadena, [facility]  11/29/2021 11:18 AM  Signed  Due to patient being non-English speaking/uses sign language, an  was used for this visit. Only for face-to-face interpretation by an external agency, date and length of interpretation can be found on the scanned worksheet.       name: Carter Sánchez (Htoo)  Language: Bell  Agency:  [employer]  Phone number: [phone]  Type of interpretation:  Face-to-face, spoken      Chief Complaint   Patient presents with     Diabetes     just follow up diabetes     Blood pressure 121/81, pulse 73, temperature 98.4  F (36.9  C), temperature source Tympanic, resp. rate 18, height 5' 8.75\" (1.746 m), weight 179 lb (81.2 kg), SpO2 99 %.           SUBJECTIVE       Lay is a 35 year old  male with a PMH significant for:     Patient Active Problem List   Diagnosis     Ureterolithiasis     Type 2 diabetes mellitus with stage 3a chronic kidney disease, with long-term current use of insulin (H)     Type 2 diabetes mellitus without complication, without long-term current use of insulin (H)     He presents for follow up of his DM, which was found incidentally in July at which point his A1c was > 14. He was started on Lantus 16 U and 1g Metformin BID and he has been adherent to his medication and is doing well. Patient denies urinary frequency, abdominal pain, weight changes, numbness or burning in feet, dizziness, shakiness, or feeling light-headed.    Diabetes Follow-up      Patient is checking blood sugars: 2-3 times a week, AM before breakfast and are 140-150         -Last A1C was   Lab Results   Component Value Date    A1C >14.0 07/15/2021        Diabetic concerns: None    Chest Pain or exercise related calf pain (claudication):no     Symptoms of hypoglycemia (low blood sugar): none     Paresthesias (numbness or burning in feet) or sores: No     Diabetic eye exam within the last year?: No      Adherence and Exercise  Medication side effects: no  How often " "is a medication missed? Less than 1 time per week  Exercise:walking  2-3 days/week for an average of 30-45 minutes       A [employer]  was used for this visit.          OBJECTIVE     Vitals:    11/29/21 1115   BP: 121/81   BP Location: Right arm   Patient Position: Sitting   Cuff Size: Adult Regular   Pulse: 73   Resp: 18   Temp: 98.4  F (36.9  C)   TempSrc: Tympanic   SpO2: 99%   Weight: 179 lb (81.2 kg)   Height: 5' 8.75\" (1.746 m)     Body mass index is 26.63 kg/m .    Constitutional: Awake, alert, cooperative, no acute distress, and appears stated age.  Eyes: sclera clear, conjunctiva normal.  ENT: NC/AT. TM clear bilaterally. Tonsils nonerythematous and without exudates or trismus.  Neck: Supple, symmetrical, trachea midline. No submandibular LAD.  Back: Symmetric, no curvature  Lungs: No increased WOB. CTABL, no crackles or wheezing appreciated.  Cardiovascular: Appears well perfused. RRR, normal S1 and S2, no S3 or S4, and no murmur appreciated.  Abdomen: Normal BS, soft, non-distended, non-tender, no masses palpated  Musculoskeletal: No redness, warmth, or swelling of the joints. Tone is normal.  Neurologic: A&Ox3.  Cranial nerves II-XII are grossly intact.  Sensory is intact and gait is normal.  Neuropsychiatric: Normal affect, mood, orientation, memory and insight.  Skin: No visible rashes, erythema, pallor, petechia or purpura.    Results for orders placed or performed in visit on 11/29/21   Hemoglobin A1c     Status: Abnormal   Result Value Ref Range    Hemoglobin A1C 8.3 (H) 0.0 - 5.6 %       ASSESSMENT AND PLAN     Lay was seen today for diabetes.    Diagnoses and all orders for this visit:    Type 2 diabetes mellitus without complication, without long-term current use of insulin (H)  -     Hemoglobin A1c  -     insulin glargine (LANTUS PEN) 100 UNIT/ML pen; Inject 16 Units Subcutaneous At Bedtime  -     insulin pen needle (B-D U/F) 31G X 5 MM miscellaneous; Use 1 daily as directed.  -     thin " (NO BRAND SPECIFIED) lancets; Use to test blood sugar 2 times daily or as directed.  -     blood glucose (NO BRAND SPECIFIED) test strip; Use to test blood sugar 2 times daily or as directed.    Patient has had significant improvement in his A1c from > 14 to 8.3, his morning fasting blood glucoses are in a good range. Dose not go into the hypoglycemic range at any point, recommended an SGLT-2 inhibitor for improvement of diabetes management and has a history of CKD which is now in a normal range. However patient declined at this time would like refills of his insulin and pen needles but not start new agents at this time.    Creatinine   Date Value Ref Range Status   07/29/2021 0.95 0.70 - 1.30 mg/dL Final           Return in about 3 months (around 2/28/2022).    Khai Solares MD  11/29/2021

## 2021-11-30 PROBLEM — E11.9 TYPE 2 DIABETES MELLITUS WITHOUT COMPLICATION, WITHOUT LONG-TERM CURRENT USE OF INSULIN (H): Status: ACTIVE | Noted: 2021-11-30

## 2022-05-28 ENCOUNTER — TRANSFERRED RECORDS (OUTPATIENT)
Dept: HEALTH INFORMATION MANAGEMENT | Facility: CLINIC | Age: 37
End: 2022-05-28

## 2022-05-28 LAB — RETINOPATHY: NEGATIVE

## 2022-06-01 ENCOUNTER — TRANSFERRED RECORDS (OUTPATIENT)
Dept: MULTI SPECIALTY CLINIC | Facility: CLINIC | Age: 37
End: 2022-06-01

## 2022-06-01 LAB — RETINOPATHY: NORMAL

## 2022-08-14 NOTE — PROGRESS NOTES
Medication Therapy Management (MTM) Encounter    ASSESSMENT:                            Medication Adherence/Access: Misunderstanding that Lantus is to be skipped if BS are normal.    Diabetes:   Goal A1C: <7%  Meeting goal A1C? No  He would benefit from increasing metformin dose. Since only taking 500mg/d, needs to titrate weekly up to 2g once daily.  Dr. Solares started Jardiance and increased Lantus to 20 units daily today.      PLAN:                              Medication reconciliation completed and EHR med list updated accordingly    Increase metformin: 2 tablets daily for 1 week, then 3 tablets daily for 1 week, then 4 tablets daily    Start Jardiance 10mg daily    Increase Lantus to 20 units daily    TAKE Lantus every day; even if blood sugars are in normal range    Urine microalbumin checked today.    Follow-up: 1 month with PharmD      Medication issues to be addressed at a future visit:      He wants to wait on starting once weekly Bydureon    He declined Freestyle Ghada as he thinks it would bother him, but he will consider in the future.    SUBJECTIVE/OBJECTIVE:                          Deonna Honeycutt is a 36 year old male seen for an initial visit for the year but he has had a previous MTM one year ago. He was referred to me from Dr. Solares. Today's visit is a co-visit with Dr. Solares.     Reason for visit: Initial Medication Therapy Management visit.    Allergies/ADRs: Reviewed in chart  Past Medical History: Reviewed in chart  Social History     Tobacco Use     Smoking status: Never Smoker     Smokeless tobacco: Never Used   Substance Use Topics     Alcohol use: Yes     Comment: Alcoholic Drinks/day: social     Drug use: Never     ^Reviewed today    Medication Adherence/Access: One issue is the misunderstanding that he is only taking his Lantus when his blood sugars are high    Diabetes  Type 2 Diabetes:  Currently prescribed metformin XR 2g daily and Lantus 16 units daily. Ran out of metformin so has only been  "taking 1 tablet daily. Previously was taking 2 tablets daily. Does not recall taking 4 tabs daily.  At last visit, Dr. Solares recommended an SGLT2i but he declined and did not want to start any new agents. Today, he is willing to start.  Blood sugar monitoring: fingerstick. Ranges (patient reported per his recall): high 100s - 200s. In past 2 weeks- ranged 180 to 330.    If blood sugars 100-130- he doesn't use Lantus  If blood sugars 200s- he uses Lantus  On average, uses Lantus 5 times weekly- 16 units each of those times.    Symptoms of low blood sugar? none  Symptoms of high blood sugar? polydipsia, polyuria and nocturia  Diet/Exercise: not discussed today  Aspirin: Not taking due to age  Statin: No; due to age   ACEi/ARB: No.   Urine Albumin: No results found for: UMALCR     A1C today: 11.3%    Lab Results   Component Value Date    A1C 8.3 11/29/2021    A1C >14.0 07/15/2021     Most Recent Immunizations   Administered Date(s) Administered     COVID-19,PF,Pfizer (12+ Yrs) 06/05/2021          BP Readings from Last 1 Encounters:   08/16/22 110/74     Pulse Readings from Last 1 Encounters:   08/16/22 57     Wt Readings from Last 1 Encounters:   08/16/22 180 lb 6.4 oz (81.8 kg)     Ht Readings from Last 1 Encounters:   11/29/21 5' 8.75\" (1.746 m)     Estimated body mass index is 26.83 kg/m  as calculated from the following:    Height as of 11/29/21: 5' 8.75\" (1.746 m).    Weight as of an earlier encounter on 8/16/22: 180 lb 6.4 oz (81.8 kg).    Temp Readings from Last 1 Encounters:   08/16/22 98.3  F (36.8  C) (Oral)       ----------------      I spent 20 minutes with this patient today. Dr. Solares was provided the recommendations above  in clinic today and Dr. France is the authorizing prescriber for this visit through the pharmacist collaborative practice agreement. A copy of the visit note was provided to the patient's provider(s).    The patient was given a summary of these recommendations. See Provider note/AVS from " today.     Rena Bey, Pharm.D.         Medication Therapy Recommendations  Type 2 diabetes mellitus with stage 3a chronic kidney disease, with long-term current use of insulin (H)    Current Medication: insulin glargine (LANTUS PEN) 100 UNIT/ML pen   Rationale: Does not understand instructions - Adherence - Adherence   Recommendation: Provide Education   Status: Patient Agreed - Adherence/Education          Current Medication: metFORMIN (GLUCOPHAGE XR) 500 MG 24 hr tablet   Rationale: Does not understand instructions - Adherence - Adherence   Recommendation: Provide Education   Status: Patient Agreed - Adherence/Education

## 2022-08-16 ENCOUNTER — OFFICE VISIT (OUTPATIENT)
Dept: PHARMACY | Facility: CLINIC | Age: 37
End: 2022-08-16
Payer: COMMERCIAL

## 2022-08-16 ENCOUNTER — OFFICE VISIT (OUTPATIENT)
Dept: FAMILY MEDICINE | Facility: CLINIC | Age: 37
End: 2022-08-16
Payer: COMMERCIAL

## 2022-08-16 VITALS
HEART RATE: 57 BPM | RESPIRATION RATE: 16 BRPM | DIASTOLIC BLOOD PRESSURE: 74 MMHG | OXYGEN SATURATION: 98 % | WEIGHT: 180.4 LBS | BODY MASS INDEX: 26.83 KG/M2 | SYSTOLIC BLOOD PRESSURE: 110 MMHG | TEMPERATURE: 98.3 F

## 2022-08-16 DIAGNOSIS — E11.9 TYPE 2 DIABETES MELLITUS WITHOUT COMPLICATION, WITHOUT LONG-TERM CURRENT USE OF INSULIN (H): ICD-10-CM

## 2022-08-16 DIAGNOSIS — N18.31 TYPE 2 DIABETES MELLITUS WITH STAGE 3A CHRONIC KIDNEY DISEASE, WITH LONG-TERM CURRENT USE OF INSULIN (H): Primary | ICD-10-CM

## 2022-08-16 DIAGNOSIS — E11.22 TYPE 2 DIABETES MELLITUS WITH STAGE 3A CHRONIC KIDNEY DISEASE, WITH LONG-TERM CURRENT USE OF INSULIN (H): Primary | ICD-10-CM

## 2022-08-16 DIAGNOSIS — Z79.4 TYPE 2 DIABETES MELLITUS WITH STAGE 3A CHRONIC KIDNEY DISEASE, WITH LONG-TERM CURRENT USE OF INSULIN (H): Primary | ICD-10-CM

## 2022-08-16 DIAGNOSIS — N18.31 TYPE 2 DIABETES MELLITUS WITH STAGE 3A CHRONIC KIDNEY DISEASE, WITH LONG-TERM CURRENT USE OF INSULIN (H): ICD-10-CM

## 2022-08-16 DIAGNOSIS — E11.22 TYPE 2 DIABETES MELLITUS WITH STAGE 3A CHRONIC KIDNEY DISEASE, WITH LONG-TERM CURRENT USE OF INSULIN (H): ICD-10-CM

## 2022-08-16 DIAGNOSIS — Z79.4 TYPE 2 DIABETES MELLITUS WITH STAGE 3A CHRONIC KIDNEY DISEASE, WITH LONG-TERM CURRENT USE OF INSULIN (H): ICD-10-CM

## 2022-08-16 LAB
ANION GAP SERPL CALCULATED.3IONS-SCNC: 9 MMOL/L (ref 7–15)
BUN SERPL-MCNC: 14 MG/DL (ref 6–20)
CALCIUM SERPL-MCNC: 9.3 MG/DL (ref 8.6–10)
CHLORIDE SERPL-SCNC: 101 MMOL/L (ref 98–107)
CHOLEST SERPL-MCNC: 219 MG/DL
CREAT SERPL-MCNC: 0.87 MG/DL (ref 0.67–1.17)
CREAT UR-MCNC: 101 MG/DL
DEPRECATED HCO3 PLAS-SCNC: 27 MMOL/L (ref 22–29)
GFR SERPL CREATININE-BSD FRML MDRD: >90 ML/MIN/1.73M2
GLUCOSE SERPL-MCNC: 256 MG/DL (ref 70–99)
HBA1C MFR BLD: 11.3 % (ref 0–5.6)
HDLC SERPL-MCNC: 38 MG/DL
HGB BLD-MCNC: 16.2 G/DL (ref 13.3–17.7)
LDLC SERPL CALC-MCNC: 134 MG/DL
MICROALBUMIN UR-MCNC: <12 MG/L
MICROALBUMIN/CREAT UR: NORMAL MG/G{CREAT}
NONHDLC SERPL-MCNC: 181 MG/DL
POTASSIUM SERPL-SCNC: 4.9 MMOL/L (ref 3.4–5.3)
SODIUM SERPL-SCNC: 137 MMOL/L (ref 136–145)
TRIGL SERPL-MCNC: 234 MG/DL

## 2022-08-16 PROCEDURE — 99214 OFFICE O/P EST MOD 30 MIN: CPT | Mod: GC | Performed by: STUDENT IN AN ORGANIZED HEALTH CARE EDUCATION/TRAINING PROGRAM

## 2022-08-16 PROCEDURE — 83036 HEMOGLOBIN GLYCOSYLATED A1C: CPT | Performed by: STUDENT IN AN ORGANIZED HEALTH CARE EDUCATION/TRAINING PROGRAM

## 2022-08-16 PROCEDURE — 99607 MTMS BY PHARM ADDL 15 MIN: CPT | Performed by: PHARMACIST

## 2022-08-16 PROCEDURE — 82043 UR ALBUMIN QUANTITATIVE: CPT | Performed by: STUDENT IN AN ORGANIZED HEALTH CARE EDUCATION/TRAINING PROGRAM

## 2022-08-16 PROCEDURE — 85018 HEMOGLOBIN: CPT | Performed by: STUDENT IN AN ORGANIZED HEALTH CARE EDUCATION/TRAINING PROGRAM

## 2022-08-16 PROCEDURE — 80048 BASIC METABOLIC PNL TOTAL CA: CPT | Performed by: STUDENT IN AN ORGANIZED HEALTH CARE EDUCATION/TRAINING PROGRAM

## 2022-08-16 PROCEDURE — 80061 LIPID PANEL: CPT | Performed by: STUDENT IN AN ORGANIZED HEALTH CARE EDUCATION/TRAINING PROGRAM

## 2022-08-16 PROCEDURE — 36415 COLL VENOUS BLD VENIPUNCTURE: CPT | Performed by: STUDENT IN AN ORGANIZED HEALTH CARE EDUCATION/TRAINING PROGRAM

## 2022-08-16 PROCEDURE — 99605 MTMS BY PHARM NP 15 MIN: CPT | Performed by: PHARMACIST

## 2022-08-16 RX ORDER — BLOOD SUGAR DIAGNOSTIC
STRIP MISCELLANEOUS
Qty: 400 STRIP | Refills: 1 | Status: SHIPPED | OUTPATIENT
Start: 2022-08-16

## 2022-08-16 RX ORDER — METFORMIN HCL 500 MG
1000 TABLET, EXTENDED RELEASE 24 HR ORAL 2 TIMES DAILY WITH MEALS
Qty: 120 TABLET | Refills: 3 | Status: SHIPPED | OUTPATIENT
Start: 2022-08-16 | End: 2023-03-28

## 2022-08-16 RX ORDER — LANCETS
EACH MISCELLANEOUS
Qty: 100 EACH | Refills: 11 | Status: SHIPPED | OUTPATIENT
Start: 2022-08-16 | End: 2023-03-28

## 2022-08-16 RX ORDER — FLURBIPROFEN SODIUM 0.3 MG/ML
SOLUTION/ DROPS OPHTHALMIC
Qty: 100 EACH | Refills: 11 | Status: SHIPPED | OUTPATIENT
Start: 2022-08-16 | End: 2022-11-22

## 2022-08-16 NOTE — PROGRESS NOTES
Assessment & Plan     Type 2 diabetes mellitus with stage 3a chronic kidney disease, with long-term current use of insulin (H)  Interval A1c was increased to 11.3, discussed that he needs to continue taking his insulin daily we will increase his Lantus to 20 units.  Also recommended titration of metformin up to 4 tablets daily, he was agreeable to starting Jardiance.  Interval diabetes labs were obtained today during her visit.  Medications were refilled.  Recommended that he keep a journal to record all of his blood sugars, Pharm.D. also discussed continuous glucose monitoring with him, however he is not interested at this time. Return to clinic in 1 month for interval diabetes check.    Per Pharm D Notes:     Medication reconciliation completed and EHR med list updated accordingly    Increase metformin: 2 tablets daily for 1 week, then 3 tablets daily for 1 week, then 4 tablets daily    Start Jardiance 10mg daily    Increase Lantus to 20 units daily    TAKE Lantus every day    - Hemoglobin; Future  - Albumin Random Urine Quantitative with Creat Ratio  - Basic metabolic panel  - Lipid Panel  - Hemoglobin A1c  - Hemoglobin  - metFORMIN (GLUCOPHAGE XR) 500 MG 24 hr tablet; Take 2 tablets (1,000 mg) by mouth 2 times daily (with meals) Then take 2 tablets in the AM and 2 in the PM after that.  - blood glucose (ACCU-CHEK GUIDE) test strip; USE TO TEST ONCE DAILY    Type 2 diabetes mellitus without complication, without long-term current use of insulin (H)  - insulin pen needle (B-D U/F) 31G X 5 MM miscellaneous; Use 1 daily as directed.  - blood glucose (NO BRAND SPECIFIED) test strip; Use to test blood sugar 2 times daily or as directed.  - insulin glargine (LANTUS PEN) 100 UNIT/ML pen; Inject 20 Units Subcutaneous At Bedtime  - empagliflozin (JARDIANCE) 10 MG TABS tablet; Take 1 tablet (10 mg) by mouth daily  - thin (NO BRAND SPECIFIED) lancets; Use to test blood sugar 2 times daily or as directed.      Return in  about 4 weeks (around 9/13/2022).    Khai Solares MD  [facility] SAAD Ying is a 36 year old, presenting for the following health issues:  Diabetes (F/ U DM no other concern today)      HPI     Patient is here for interval follow-up for type 2 diabetes.  He was recently diagnosed a little over a year ago, started on insulin and has been taking 16 units of Lantus.  However he has not taken his medication consistently, he has been skipping doses every once in a while when his blood sugars look well controlled in the 100s.  There is also a period of time where he was traveling in town and he did not take his medications at that point either.  He is also instructed to take 2 tablets of metformin twice a day, titrating up during her last visit however he due to misunderstanding he has only been taking 1 tablet of metformin daily.  This morning his fasting a.m. blood sugar was 230.  He has been able to check his blood sugars at least once a day with lancets.  Denies any other subjective symptoms or concerns, he does note that he has been hungrier and eating more lately.    Diabetes Follow-up    How often are you checking your blood sugar? One time daily  What time of day are you checking your blood sugars (select all that apply)?  Fasting AM glucose checks  Have you had any blood sugars above 200?  Yes this morning was 230  Have you had any blood sugars below 70?  No    What symptoms do you notice when your blood sugar is low?  None    What concerns do you have today about your diabetes? None     Do you have any of these symptoms? (Select all that apply)  No numbness or tingling in feet.  No redness, sores or blisters on feet.  No complaints of excessive thirst.  No reports of blurry vision.  No significant changes to weight.    Have you had a diabetic eye exam in the last 12 months? Yes- Date of last eye exam: 6/22,  Location: [facility]        BP Readings from Last 2 Encounters:    08/16/22 110/74   11/29/21 121/81     Hemoglobin A1C (%)   Date Value   08/16/2022 11.3 (H)   11/29/2021 8.3 (H)     LDL Cholesterol Calculated (mg/dL)   Date Value   07/02/2021 168 (H)       Review of Systems   Constitutional, HEENT, cardiovascular, pulmonary, gi and gu systems are negative, except as otherwise noted.      Objective    /74   Pulse 57   Temp 98.3  F (36.8  C) (Oral)   Resp 16   Wt 81.8 kg (180 lb 6.4 oz)   SpO2 98%   BMI 26.83 kg/m    Body mass index is 26.83 kg/m .  Physical Exam   GENERAL: healthy, alert and no distress  EYES: Eyes grossly normal to inspection, PERRL and conjunctivae and sclerae normal  HENT: ear canals and TM's normal, nose and mouth without ulcers or lesions  NECK: no adenopathy, no asymmetry, masses, or scars and thyroid normal to palpation  RESP: lungs clear to auscultation - no rales, rhonchi or wheezes  CV: regular rate and rhythm, normal S1 S2, no S3 or S4, no murmur, click or rub, no peripheral edema and peripheral pulses strong  ABDOMEN: soft, nontender, no hepatosplenomegaly, no masses and bowel sounds normal  MS: no gross musculoskeletal defects noted, no edema  SKIN: no suspicious lesions or rashes  NEURO: Normal strength and tone, mentation intact and speech normal  PSYCH: mentation appears normal, affect normal/bright    Results for orders placed or performed in visit on 08/16/22   Hemoglobin A1c     Status: Abnormal   Result Value Ref Range    Hemoglobin A1C 11.3 (H) 0.0 - 5.6 %   Hemoglobin     Status: Normal   Result Value Ref Range    Hemoglobin 16.2 13.3 - 17.7 g/dL       ----- Service Performed and Documented by Resident or Fellow ------            .  ..

## 2022-08-16 NOTE — PATIENT INSTRUCTIONS
Increase metformin: 2 tablets daily for 1 week, then 3 tablets daily for 1 week, then 4 tablets daily  Start Jardiance 10mg daily in the morning  Increase Lantus to 20 units daily  TAKE Lantus and diabetes pills every day

## 2022-08-16 NOTE — NURSING NOTE
Due to patient being non-English speaking/uses sign language, an  was used for this visit. Only for face-to-face interpretation by an external agency, date and length of interpretation can be found on the scanned worksheet.     name: Chilango   Agency: [employer]  Language: Bell   Telephone number: [phone]  Type of interpretation: Telephone, spoken

## 2022-08-16 NOTE — PROGRESS NOTES
Preceptor attestation:  Vital signs reviewed: /74   Pulse 57   Temp 98.3  F (36.8  C) (Oral)   Resp 16   Wt 81.8 kg (180 lb 6.4 oz)   SpO2 98%   BMI 26.83 kg/m      Patient seen, evaluated, and discussed with the resident.  I have verified the content of the note, which accurately reflects my assessment of the patient and the plan of care.    Supervising physician: Shila Bowens MD  [facility]

## 2022-11-22 ENCOUNTER — OFFICE VISIT (OUTPATIENT)
Dept: FAMILY MEDICINE | Facility: CLINIC | Age: 37
End: 2022-11-22
Payer: COMMERCIAL

## 2022-11-22 VITALS
BODY MASS INDEX: 25.92 KG/M2 | OXYGEN SATURATION: 97 % | DIASTOLIC BLOOD PRESSURE: 82 MMHG | HEART RATE: 81 BPM | TEMPERATURE: 98.1 F | RESPIRATION RATE: 18 BRPM | HEIGHT: 69 IN | SYSTOLIC BLOOD PRESSURE: 122 MMHG | WEIGHT: 175 LBS

## 2022-11-22 DIAGNOSIS — N18.31 TYPE 2 DIABETES MELLITUS WITH STAGE 3A CHRONIC KIDNEY DISEASE, WITH LONG-TERM CURRENT USE OF INSULIN (H): Primary | ICD-10-CM

## 2022-11-22 DIAGNOSIS — E11.9 TYPE 2 DIABETES MELLITUS WITHOUT COMPLICATION, WITHOUT LONG-TERM CURRENT USE OF INSULIN (H): ICD-10-CM

## 2022-11-22 DIAGNOSIS — Z23 HIGH PRIORITY FOR 2019-NCOV VACCINE: ICD-10-CM

## 2022-11-22 DIAGNOSIS — Z00.00 ROUTINE GENERAL MEDICAL EXAMINATION AT A HEALTH CARE FACILITY: Primary | ICD-10-CM

## 2022-11-22 DIAGNOSIS — Z79.4 TYPE 2 DIABETES MELLITUS WITH STAGE 3A CHRONIC KIDNEY DISEASE, WITH LONG-TERM CURRENT USE OF INSULIN (H): Primary | ICD-10-CM

## 2022-11-22 DIAGNOSIS — Z23 NEED FOR VACCINATION: ICD-10-CM

## 2022-11-22 DIAGNOSIS — E11.22 TYPE 2 DIABETES MELLITUS WITH STAGE 3A CHRONIC KIDNEY DISEASE, WITH LONG-TERM CURRENT USE OF INSULIN (H): Primary | ICD-10-CM

## 2022-11-22 LAB — HBA1C MFR BLD: 7.1 % (ref 0–5.6)

## 2022-11-22 PROCEDURE — 83036 HEMOGLOBIN GLYCOSYLATED A1C: CPT | Performed by: STUDENT IN AN ORGANIZED HEALTH CARE EDUCATION/TRAINING PROGRAM

## 2022-11-22 PROCEDURE — 87340 HEPATITIS B SURFACE AG IA: CPT | Performed by: STUDENT IN AN ORGANIZED HEALTH CARE EDUCATION/TRAINING PROGRAM

## 2022-11-22 PROCEDURE — 36415 COLL VENOUS BLD VENIPUNCTURE: CPT | Performed by: STUDENT IN AN ORGANIZED HEALTH CARE EDUCATION/TRAINING PROGRAM

## 2022-11-22 PROCEDURE — 90472 IMMUNIZATION ADMIN EACH ADD: CPT | Performed by: STUDENT IN AN ORGANIZED HEALTH CARE EDUCATION/TRAINING PROGRAM

## 2022-11-22 PROCEDURE — 90471 IMMUNIZATION ADMIN: CPT | Performed by: STUDENT IN AN ORGANIZED HEALTH CARE EDUCATION/TRAINING PROGRAM

## 2022-11-22 PROCEDURE — 90677 PCV20 VACCINE IM: CPT | Performed by: STUDENT IN AN ORGANIZED HEALTH CARE EDUCATION/TRAINING PROGRAM

## 2022-11-22 PROCEDURE — 90715 TDAP VACCINE 7 YRS/> IM: CPT | Performed by: STUDENT IN AN ORGANIZED HEALTH CARE EDUCATION/TRAINING PROGRAM

## 2022-11-22 PROCEDURE — 99395 PREV VISIT EST AGE 18-39: CPT | Mod: 25 | Performed by: STUDENT IN AN ORGANIZED HEALTH CARE EDUCATION/TRAINING PROGRAM

## 2022-11-22 ASSESSMENT — ENCOUNTER SYMPTOMS
WEAKNESS: 0
FEVER: 0
HEMATURIA: 0
SHORTNESS OF BREATH: 0
CONSTIPATION: 0
DYSURIA: 0
PALPITATIONS: 0
PARESTHESIAS: 0
JOINT SWELLING: 0
EYE PAIN: 0
NERVOUS/ANXIOUS: 0
FREQUENCY: 0
HEMATOCHEZIA: 0
CHILLS: 0
ARTHRALGIAS: 0
ABDOMINAL PAIN: 0
SORE THROAT: 0
HEARTBURN: 0
DIARRHEA: 0
HEADACHES: 0
MYALGIAS: 0
DIZZINESS: 0
COUGH: 0
NAUSEA: 0

## 2022-11-22 NOTE — LETTER
RETURN TO WORK/SCHOOL FORM    11/22/2022    Re: Deonna Honeycutt  1985      To Whom It May Concern:     Deonna Honeycutt was seen in clinic today..  He may return to work without restrictions on 11/23/22          Restrictions:  None      Khai Solares MD  11/22/2022 2:29 PM

## 2022-11-22 NOTE — PROGRESS NOTES
Preceptor Attestation:    I discussed the patient with the resident and evaluated the patient in person. I have verified the content of the note, which accurately reflects my assessment of the patient and the plan of care.   Supervising Physician:  Jose Dillon MD.

## 2022-11-22 NOTE — PROGRESS NOTES
SUBJECTIVE:   CC: Lay is an 36 year old who presents for preventative health visit.     HPI  Ability to successfully perform activities of daily living: Yes, no assistance needed  Home safety:  none identified   Hearing impairment: None    Diabetes Follow-up    How often are you checking your blood sugar? One time daily  What time of day are you checking your blood sugars (select all that apply)?  Before meals  Have you had any blood sugars above 200?  No  Have you had any blood sugars below 70?  No    What symptoms do you notice when your blood sugar is low?  None    What concerns do you have today about your diabetes? None     Do you have any of these symptoms? (Select all that apply)  No numbness or tingling in feet.  No redness, sores or blisters on feet.  No complaints of excessive thirst.  No reports of blurry vision.  No significant changes to weight.    AM Blood Sugars are 140-160 fasting AM.    BP Readings from Last 2 Encounters:   11/22/22 122/82   08/16/22 110/74     Hemoglobin A1C (%)   Date Value   11/22/2022 7.1 (H)   08/16/2022 11.3 (H)     LDL Cholesterol Calculated (mg/dL)   Date Value   08/16/2022 134 (H)   07/02/2021 168 (H)     Today's PHQ-2 Score:   PHQ-2 ( 1999 [employer]) 11/22/2022   Q1: Little interest or pleasure in doing things 0   Q2: Feeling down, depressed or hopeless 0   PHQ-2 Score 0   PHQ-2 Total Score (12-17 Years)- Positive if 3 or more points; Administer PHQ-A if positive -   Q1: Little interest or pleasure in doing things Not at all   Q2: Feeling down, depressed or hopeless Not at all   PHQ-2 Score 0       Have you ever done Advance Care Planning? (For example, a Health Directive, POLST, or a discussion with a medical provider or your loved ones about your wishes): No, advance care planning information given to patient to review.  Patient declined advance care planning discussion at this time.    Social History     Tobacco Use     Smoking status: Never     Smokeless tobacco: Never    Substance Use Topics     Alcohol use: Yes     Comment: Alcoholic Drinks/day: social     Alcohol Use 11/22/2022   Prescreen: >3 drinks/day or >7 drinks/week? No   Prescreen: >3 drinks/day or >7 drinks/week? -   No flowsheet data found.    Last PSA: No results found for: PSA    Reviewed orders with patient. Reviewed health maintenance and updated orders accordingly - Yes  BP Readings from Last 3 Encounters:   11/22/22 122/82   08/16/22 110/74   11/29/21 121/81    Wt Readings from Last 3 Encounters:   11/22/22 79.4 kg (175 lb)   08/16/22 81.8 kg (180 lb 6.4 oz)   11/29/21 81.2 kg (179 lb)                  Patient Active Problem List   Diagnosis     Ureterolithiasis     Type 2 diabetes mellitus with stage 3a chronic kidney disease, with long-term current use of insulin (H)     Type 2 diabetes mellitus without complication, without long-term current use of insulin (H)     Past Surgical History:   Procedure Laterality Date     HC CYSTOSCOPY,INSERT URETERAL STENT Left 09/25/2018    Procedure: CYSTOSCOPY LEFT URETEROSCOPY STENT INSERTION, INCISION OF URETER;  Surgeon: Corky Hercules MD;  Location: [facility];  Service: Urology     KIDNEY STONE SURGERY Left     2018     OTHER SURGICAL HISTORY      none     SURGICAL PATHOLOGY EXAM Left     Lipoma Removal       Social History     Tobacco Use     Smoking status: Never     Smokeless tobacco: Never   Substance Use Topics     Alcohol use: Yes     Comment: Alcoholic Drinks/day: social     Family History   Problem Relation Age of Onset     Diabetes Father      Hypertension Father      Urolithiasis Father      Nephrolithiasis Mother      Urolithiasis Mother      Diabetes Sister      Cancer No family hx of          Current Outpatient Medications   Medication Sig Dispense Refill     blood glucose (ACCU-CHEK GUIDE) test strip USE TO TEST ONCE DAILY 400 strip 1     blood glucose (NO BRAND SPECIFIED) test strip Use to test blood sugar 2 times daily or as directed. 100 strip  3     empagliflozin (JARDIANCE) 10 MG TABS tablet Take 1 tablet (10 mg) by mouth daily 30 tablet 4     metFORMIN (GLUCOPHAGE XR) 500 MG 24 hr tablet Take 2 tablets (1,000 mg) by mouth 2 times daily (with meals) Then take 2 tablets in the AM and 2 in the PM after that. 120 tablet 3     thin (NO BRAND SPECIFIED) lancets Use to test blood sugar 2 times daily or as directed. 100 each 11     No Known Allergies  Recent Labs   Lab Test 11/22/22  1326 08/16/22  0903 11/29/21  1133 07/29/21  1230 07/15/21  1056 07/02/21  1137 07/19/18  1131 07/13/18  0632   A1C 7.1* 11.3* 8.3*  --    < >  --   --   --    LDL  --  134*  --   --   --  168*  --   --    HDL  --  38*  --   --   --  44.4  --   --    TRIG  --  234*  --   --   --  246.1*  --   --    CR  --  0.87  --  0.95  --   --  1.17  1.21 1.25   GFRESTIMATED  --  >90  --  >90  --   --  >60  >60 >60   GFRESTBLACK  --   --   --   --   --   --  >60  >60 >60   POTASSIUM  --  4.9  --  4.1  --   --  4.7 3.5    < > = values in this interval not displayed.        Reviewed and updated as needed this visit by clinical staff     Meds  Problems             Reviewed and updated as needed this visit by Provider     Meds  Problems                Review of Systems  CONSTITUTIONAL: NEGATIVE for fever, chills, change in weight  INTEGUMENTARY/SKIN: NEGATIVE for worrisome rashes, moles or lesions  EYES: NEGATIVE for vision changes or irritation  ENT: NEGATIVE for ear, mouth and throat problems  RESP: NEGATIVE for significant cough or SOB  CV: NEGATIVE for chest pain, palpitations or peripheral edema  GI: NEGATIVE for nausea, abdominal pain, heartburn, or change in bowel habits   male: negative for dysuria, hematuria, decreased urinary stream, erectile dysfunction, urethral discharge  MUSCULOSKELETAL: NEGATIVE for significant arthralgias or myalgia  NEURO: NEGATIVE for weakness, dizziness or paresthesias  PSYCHIATRIC: NEGATIVE for changes in mood or affect    OBJECTIVE:   /82 (BP  "Location: Left arm, Patient Position: Sitting, Cuff Size: Adult Regular)   Pulse 81   Temp 98.1  F (36.7  C) (Tympanic)   Resp 18   Ht 1.74 m (5' 8.5\")   Wt 79.4 kg (175 lb)   SpO2 97%   BMI 26.22 kg/m      Physical Exam  GENERAL: healthy, alert and no distress  EYES: Eyes grossly normal to inspection, PERRL and conjunctivae and sclerae normal  HENT: ear canals and TM's normal, nose and mouth without ulcers or lesions  NECK: no adenopathy, no asymmetry, masses, or scars and thyroid normal to palpation  RESP: lungs clear to auscultation - no rales, rhonchi or wheezes  CV: regular rate and rhythm, normal S1 S2, no S3 or S4, no murmur, click or rub, no peripheral edema and peripheral pulses strong  ABDOMEN: soft, nontender, no hepatosplenomegaly, no masses and bowel sounds normal  MS: no gross musculoskeletal defects noted, no edema  SKIN: no suspicious lesions or rashes  NEURO: Normal strength and tone, mentation intact and speech normal  PSYCH: mentation appears normal, affect normal/bright    Diagnostic Test Results:  Labs reviewed in Epic  Results for orders placed or performed in visit on 11/22/22 (from the past 24 hour(s))   Hemoglobin A1c   Result Value Ref Range    Hemoglobin A1C 7.1 (H) 0.0 - 5.6 %       ASSESSMENT/PLAN:       ICD-10-CM    1. Routine general medical examination at a health care facility  Z00.00 Hepatitis B surface antigen     Hepatitis B surface antigen      2. Type 2 diabetes mellitus without complication, without long-term current use of insulin (H)  E11.9 Hemoglobin A1c     empagliflozin (JARDIANCE) 10 MG TABS tablet      3. High priority for 2019-nCoV vaccine  Z23       4. Need for vaccination  Z23 TDAP VACCINE (Adacel, Boostrix)     Pneumococcal 20 Valent Conjugate (Prevnar 20)     CANCELED: INFLUENZA VACCINE IM > 6 MONTHS VALENT IIV4 (AFLURIA/FLUZONE)        1. Interval diabetes have improved significantly on metformin and jardiance, continue current medication course and repeat " "lab work in 3 months. Has stopped lantus since late August and appears to be doing well. Fasting AM BG are typically 140-160.  Lab Results   Component Value Date    A1C 7.1 11/22/2022    A1C 11.3 08/16/2022    A1C 8.3 11/29/2021    A1C >14.0 07/15/2021     2.      Immunizations  Vaccinated for: Pneumococcal and TDAP and Declined: Influenza, Covid-19 due to Concerns about side effects/safety        COUNSELING:   Reviewed preventive health counseling, as reflected in patient instructions  Special attention given to:        Regular exercise       Healthy diet/nutrition               Alcohol Use        Safe sex practices/STD prevention      BMI:   Estimated body mass index is 26.22 kg/m  as calculated from the following:    Height as of this encounter: 1.74 m (5' 8.5\").    Weight as of this encounter: 79.4 kg (175 lb).         He reports that he has never smoked. He has never used smokeless tobacco.    Khai Solares MD  [facility]  "

## 2022-11-23 LAB — HBV SURFACE AG SERPL QL IA: NONREACTIVE

## 2023-03-28 ENCOUNTER — OFFICE VISIT (OUTPATIENT)
Dept: FAMILY MEDICINE | Facility: CLINIC | Age: 38
End: 2023-03-28
Payer: COMMERCIAL

## 2023-03-28 VITALS
RESPIRATION RATE: 22 BRPM | HEIGHT: 69 IN | TEMPERATURE: 98.2 F | DIASTOLIC BLOOD PRESSURE: 85 MMHG | SYSTOLIC BLOOD PRESSURE: 126 MMHG | WEIGHT: 185.2 LBS | HEART RATE: 94 BPM | BODY MASS INDEX: 27.43 KG/M2 | OXYGEN SATURATION: 98 %

## 2023-03-28 DIAGNOSIS — E11.65 TYPE 2 DIABETES MELLITUS WITH HYPERGLYCEMIA, WITHOUT LONG-TERM CURRENT USE OF INSULIN (H): ICD-10-CM

## 2023-03-28 DIAGNOSIS — E11.9 TYPE 2 DIABETES MELLITUS WITHOUT COMPLICATION, WITHOUT LONG-TERM CURRENT USE OF INSULIN (H): Primary | ICD-10-CM

## 2023-03-28 DIAGNOSIS — E78.2 MIXED DYSLIPIDEMIA: Primary | ICD-10-CM

## 2023-03-28 LAB — HBA1C MFR BLD: 8.1 % (ref 0–5.6)

## 2023-03-28 PROCEDURE — 83036 HEMOGLOBIN GLYCOSYLATED A1C: CPT | Performed by: STUDENT IN AN ORGANIZED HEALTH CARE EDUCATION/TRAINING PROGRAM

## 2023-03-28 PROCEDURE — 36415 COLL VENOUS BLD VENIPUNCTURE: CPT | Performed by: STUDENT IN AN ORGANIZED HEALTH CARE EDUCATION/TRAINING PROGRAM

## 2023-03-28 PROCEDURE — 99214 OFFICE O/P EST MOD 30 MIN: CPT | Mod: GC | Performed by: STUDENT IN AN ORGANIZED HEALTH CARE EDUCATION/TRAINING PROGRAM

## 2023-03-28 RX ORDER — GLUCOSAMINE HCL/CHONDROITIN SU 500-400 MG
CAPSULE ORAL
Qty: 100 EACH | Refills: 11 | Status: SHIPPED | OUTPATIENT
Start: 2023-03-28

## 2023-03-28 RX ORDER — ATORVASTATIN CALCIUM 20 MG/1
20 TABLET, FILM COATED ORAL DAILY
Qty: 90 TABLET | Refills: 3 | Status: SHIPPED | OUTPATIENT
Start: 2023-03-28

## 2023-03-28 RX ORDER — LANCETS
EACH MISCELLANEOUS
Qty: 100 EACH | Refills: 11 | Status: SHIPPED | OUTPATIENT
Start: 2023-03-28

## 2023-03-28 RX ORDER — METFORMIN HCL 500 MG
1000 TABLET, EXTENDED RELEASE 24 HR ORAL 2 TIMES DAILY WITH MEALS
Qty: 120 TABLET | Refills: 11 | Status: SHIPPED | OUTPATIENT
Start: 2023-03-28

## 2023-03-28 NOTE — LETTER
March 28, 2023      Deonna TORI Pankajaron  [address] [place] [address]      To whom it may concern:    Deonna TORI Yinka was seen at [facility] on March 28, 2023. Please excuse his absence.    Sincerely,    Tray Winters MD

## 2023-03-28 NOTE — PROGRESS NOTES
Preceptor Attestation:    I discussed the patient with the resident and evaluated the patient in person. I have verified the content of the note, which accurately reflects my assessment of the patient and the plan of care.   Supervising Physician:  Jelani Ramos MD.

## 2023-03-28 NOTE — PROGRESS NOTES
Assessment and Plan      Lay was seen today for diabetes.    Diagnoses and all orders for this visit:    Type 2 diabetes mellitus with hyperglycemia, without long-term current use of insulin (H)  Patient following up for T2DM without microalbuminuria/CKD but with hyperglycemia with A1C 8.1 today, up from 7.1 . Medication regimen is Jardiance 10mg and metformin 2g; no longer on insulin; did note not having access to Jardiance for past 2 months. Patient elected to remain on this regimen with change. Was agreeable to starting atorvastatin 20mg. Refilled glucose testing supplies.  -     Hemoglobin A1c  -     metFORMIN (GLUCOPHAGE XR) 500 MG 24 hr tablet; Take 2 tablets (1,000 mg) by mouth 2 times daily (with meals) Then take 2 tablets in the AM and 2 in the PM after that.  -     empagliflozin (JARDIANCE) 10 MG TABS tablet; Take 1 tablet (10 mg) by mouth daily  -     thin (NO BRAND SPECIFIED) lancets; Use to test blood sugar 2 times daily or as directed.  -     blood glucose (NO BRAND SPECIFIED) test strip; Use to test blood sugar 2 times daily or as directed.  -     alcohol swab prep pads; Use to swab area of injection/gissell as directed.  -     atorvastatin (LIPITOR) 20 MG tablet; Take 1 tablet (20 mg) by mouth daily    Mixed dyslipidemia  Atorvastatin as above    Review of the result(s) of each unique test - A1c today; earlier BMPs, microalbumin, & A1Cs  Diagnosis or treatment significantly limited by social determinants of health - Bell  used  Ordering of each unique test  Prescription drug management    Options for treatment and follow-up care were reviewed with the patient. Patient engaged in the decision making process and verbalized understanding of the options discussed and agreed with the final plan.    Patient was staffed with attending physician Dr. Ramos.    Tray Winters MD PGY-3           HPI       Lay D Yinka is a 37 year old year old male w/ PMH of   Patient Active Problem List   Diagnosis  "    Type 2 diabetes mellitus with hyperglycemia, without long-term current use of insulin (H)    who presents for   Chief Complaint   Patient presents with     Diabetes     Follow-up dm       Diabetes Follow-up  How often are you checking your blood sugar? A few times a week  What time of day are you checking your blood sugars (select all that apply)?  Before breakfast 140-150  Have you had any blood sugars above 200?  No  Have you had any blood sugars below 70?  No    What symptoms do you notice when your blood sugar is low?  None    What concerns do you have today about your diabetes? Other: Jardiance - not able to up at pharmacy after the first month     Do you have any of these symptoms? (Select all that apply)  No numbness or tingling in feet.  No redness, sores or blisters on feet.  No complaints of excessive thirst.  No reports of blurry vision.  No significant changes to weight.      BP Readings from Last 2 Encounters:   03/28/23 126/85   11/22/22 122/82     Hemoglobin A1C (%)   Date Value   03/28/2023 8.1 (H)   11/22/2022 7.1 (H)     LDL Cholesterol Calculated (mg/dL)   Date Value   08/16/2022 134 (H)   07/02/2021 168 (H)       A [employer])  was used for  this visit.           Review of Systems:   8 point ROS negative other than as specified above.         Physical Exam:   /85   Pulse 94   Temp 98.2  F (36.8  C) (Oral)   Resp 22   Ht 1.753 m (5' 9\")   Wt 84 kg (185 lb 3.2 oz)   SpO2 98%   BMI 27.35 kg/m      Vitals were reviewed and were normal     Exam:  Constitutional: healthy, alert, no distress, and cooperative  Head: normocephalic  Cardiovascular: appears well perfused; RRR w/o audible murmur  Respiratory: breathing comfortably on RA; {bilateral clear lungs w/o wheezing, crackles or rhonchi  Abdomen: soft, non-tender, non-distended; no CVA tenderness  Musculoskeletal: extremities normal- no gross deformities noted, gait normal  Skin: no suspicious lesions or rashes on exposed " skin  Neurologic: grossly normal CN  Psychiatric: mentation appears normal and affect normal       Results:      Results from this visit  Results for orders placed or performed in visit on 03/28/23   Hemoglobin A1c     Status: Abnormal   Result Value Ref Range    Hemoglobin A1C 8.1 (H) 0.0 - 5.6 %

## 2023-03-28 NOTE — NURSING NOTE
Due to patient being non-English speaking/uses sign language, an  was used for this visit. Only for face-to-face interpretation by an external agency, date and length of interpretation can be found on the scanned worksheet.       name: Carter Sánchez (Htoo)  Language: Bell  Agency:  [employer]  Phone number: [phone]  Type of interpretation:  Face-to-face, spoken

## 2023-04-06 PROBLEM — E78.2 MIXED DYSLIPIDEMIA: Status: ACTIVE | Noted: 2023-04-06

## 2023-04-06 PROBLEM — E11.65 TYPE 2 DIABETES MELLITUS WITH HYPERGLYCEMIA, WITHOUT LONG-TERM CURRENT USE OF INSULIN (H): Status: ACTIVE | Noted: 2021-11-30

## 2023-04-06 PROBLEM — N20.1 URETEROLITHIASIS: Status: RESOLVED | Noted: 2018-07-12 | Resolved: 2023-04-06

## 2023-10-12 ENCOUNTER — HOSPITAL ENCOUNTER (EMERGENCY)
Facility: HOSPITAL | Age: 38
Discharge: HOME | End: 2023-10-12
Attending: EMERGENCY MEDICINE | Admitting: EMERGENCY MEDICINE
Payer: COMMERCIAL

## 2023-10-12 VITALS
HEIGHT: 69 IN | SYSTOLIC BLOOD PRESSURE: 136 MMHG | HEART RATE: 83 BPM | BODY MASS INDEX: 27.53 KG/M2 | RESPIRATION RATE: 18 BRPM | DIASTOLIC BLOOD PRESSURE: 94 MMHG | TEMPERATURE: 98.1 F | OXYGEN SATURATION: 99 % | WEIGHT: 185.9 LBS

## 2023-10-12 DIAGNOSIS — M54.41 ACUTE RIGHT-SIDED LOW BACK PAIN WITH RIGHT-SIDED SCIATICA: ICD-10-CM

## 2023-10-12 PROCEDURE — 99284 EMERGENCY DEPT VISIT MOD MDM: CPT

## 2023-10-12 PROCEDURE — 250N000012 HC RX MED GY IP 250 OP 636 PS 637: Performed by: EMERGENCY MEDICINE

## 2023-10-12 PROCEDURE — 250N000013 HC RX MED GY IP 250 OP 250 PS 637: Performed by: EMERGENCY MEDICINE

## 2023-10-12 RX ORDER — PREDNISONE 20 MG/1
40 TABLET ORAL ONCE
Status: COMPLETED | OUTPATIENT
Start: 2023-10-12 | End: 2023-10-12

## 2023-10-12 RX ORDER — CYCLOBENZAPRINE HCL 10 MG
10 TABLET ORAL 3 TIMES DAILY PRN
Qty: 15 TABLET | Refills: 0 | Status: SHIPPED | OUTPATIENT
Start: 2023-10-12 | End: 2023-10-17

## 2023-10-12 RX ORDER — METHYLPREDNISOLONE 4 MG
TABLET, DOSE PACK ORAL
Qty: 21 TABLET | Refills: 0 | Status: SHIPPED | OUTPATIENT
Start: 2023-10-12 | End: 2023-10-25

## 2023-10-12 RX ORDER — ACETAMINOPHEN 325 MG/1
975 TABLET ORAL ONCE
Status: COMPLETED | OUTPATIENT
Start: 2023-10-12 | End: 2023-10-12

## 2023-10-12 RX ADMIN — ACETAMINOPHEN 975 MG: 325 TABLET ORAL at 13:33

## 2023-10-12 RX ADMIN — PREDNISONE 40 MG: 20 TABLET ORAL at 13:33

## 2023-10-12 ASSESSMENT — ENCOUNTER SYMPTOMS
FATIGUE: 0
BACK PAIN: 1
DYSURIA: 0
SHORTNESS OF BREATH: 0
ABDOMINAL PAIN: 0
VOMITING: 0
CHILLS: 0
COUGH: 0
WEAKNESS: 0
HEADACHES: 0
FEVER: 0

## 2023-10-12 ASSESSMENT — ACTIVITIES OF DAILY LIVING (ADL): ADLS_ACUITY_SCORE: 33

## 2023-10-12 NOTE — Clinical Note
Deonna Honeycutt was seen and treated in our emergency department on 10/12/2023.  He may return to work on 10/14/2023.  Avoid lifting >15 pounds for one week (until 10/19/23)     If you have any questions or concerns, please don't hesitate to call.      Ab, Anali CROOKS, DO

## 2023-10-12 NOTE — ED PROVIDER NOTES
EMERGENCY DEPARTMENT ENCOUNTER      NAME: Deonna Honeycutt  AGE: 37 year old male  YOB: 1985  MRN: [id number]  EVALUATION DATE & TIME: 10/12/2023 12:55 PM    PCP: Miryam Ruiz    ED PROVIDER: Anali Berger DO      Chief Complaint   Patient presents with    Back Pain         FINAL IMPRESSION:  1. Acute right-sided low back pain with right-sided sciatica          ED COURSE & MEDICAL DECISION MAKING:    Pertinent Labs & Imaging studies reviewed. (See chart for details)  1:05 PM I met the patient and performed my initial interview and exam.    37 year old male presents to the Emergency Department for evaluation of low back pain.  Patient presenting with acute on chronic onset back pain. Woke up with pain three days ago. No evidence for cauda equina, spinal infection, bony injury, other significant pathology. The patient did not have any urinary incontinence, bowel incontinence, saddle anesthesia, fever, or weight loss that would warrant imaging. Patient was provided with tylenol and prednisone in the ED. Provided prescription for medrol dose pack and muscle relaxant. Encouraged scheduled motrin, tylenol, ice/heat.  Patient was advised to followup with primary physician in 7-14 days if continues to have back pain. Referral placed to spine center.  Advised to return to ER if signs of cauda equina or spinal infection including loss of bowel or bladder function, peripheral numbness/weakness/tingling, significant fevers, or other concerns. At time of discharge patient able to ambulate and vitals stable.    At the conclusion of the encounter I discussed the results of all of the tests and the disposition. The questions were answered. The patient or family acknowledged understanding and was agreeable with the care plan.     Medical Decision Making    History:  Supplemental history from: Documented in chart, if applicable  External Record(s) reviewed: Documented in chart, if applicable.    Work Up:  Chart  documentation includes differential considered and any EKGs or imaging independently interpreted by provider, where specified.  In additional to work up documented, I considered the following work up: Documented in chart, if applicable.    External consultation:  Discussion of management with another provider: Documented in chart, if applicable    Complicating factors:  Care impacted by chronic illness: Diabetes and Other: s/p cystoscopy left   Care affected by social determinants of health: N/A    Disposition considerations: Discharge. I prescribed additional prescription strength medication(s) as charted. See documentation for any additional details.      MEDICATIONS GIVEN IN THE EMERGENCY:  Medications   predniSONE (DELTASONE) tablet 40 mg (has no administration in time range)   acetaminophen (TYLENOL) tablet 975 mg (has no administration in time range)       NEW PRESCRIPTIONS STARTED AT TODAY'S ER VISIT  New Prescriptions    No medications on file          =================================================================    HPI    Patient information was obtained from: patient     Use of : Miko - [facility] VALLE Yinka is a 37 year old male with a pertinent history of diabetes mellitus, s/p cystoscopy left who presents to this ED via personal vehicle for evaluation of back pain.     Patient reports atraumatic lower right back pain radiating through the back of his right leg into his foot since Monday. Denies history of back surgery or back pain. He has taken ibuprofen with no improvement. Pain is increased with standing up, sitting down, and twisting and during. Denies abdominal pain, dysuria, fever, chills, testicular pain, or additional symptoms or complaints at this time.     Patient states he sometimes has to do heavy lifting at work.     REVIEW OF SYSTEMS   Review of Systems   Constitutional:  Negative for chills, fatigue and fever.   Respiratory:  Negative for cough and shortness of breath.   "  Cardiovascular:  Negative for chest pain.   Gastrointestinal:  Negative for abdominal pain and vomiting.   Genitourinary:  Negative for dysuria and testicular pain.   Musculoskeletal:  Positive for back pain (right lower bp into his right foot).   Skin: Negative.    Neurological:  Negative for syncope, weakness and headaches.        PAST MEDICAL HISTORY:  Past Medical History:   Diagnosis Date    Type 2 diabetes mellitus (H)     Ureterolithiasis 07/12/2018       PAST SURGICAL HISTORY:  Past Surgical History:   Procedure Laterality Date    HC CYSTOSCOPY,INSERT URETERAL STENT Left 09/25/2018    Procedure: CYSTOSCOPY LEFT URETEROSCOPY STENT INSERTION, INCISION OF URETER;  Surgeon: Corky Hercules MD;  Location: [facility];  Service: Urology    KIDNEY STONE SURGERY Left     2018    OTHER SURGICAL HISTORY      none    SURGICAL PATHOLOGY EXAM Left     Lipoma Removal           CURRENT MEDICATIONS:    alcohol swab prep pads  atorvastatin (LIPITOR) 20 MG tablet  blood glucose (ACCU-CHEK GUIDE) test strip  blood glucose (NO BRAND SPECIFIED) test strip  empagliflozin (JARDIANCE) 10 MG TABS tablet  metFORMIN (GLUCOPHAGE XR) 500 MG 24 hr tablet  thin (NO BRAND SPECIFIED) lancets         ALLERGIES:  No Known Allergies    FAMILY HISTORY:  Family History   Problem Relation Age of Onset    Diabetes Father     Hypertension Father     Urolithiasis Father     Nephrolithiasis Mother     Urolithiasis Mother     Diabetes Sister     Cancer No family hx of        SOCIAL HISTORY:   Social History     Socioeconomic History    Marital status:    Tobacco Use    Smoking status: Never    Smokeless tobacco: Never   Substance and Sexual Activity    Alcohol use: Yes     Comment: Alcoholic Drinks/day: social    Drug use: Never    Sexual activity: Not Currently       VITALS:  BP (!) 136/94   Pulse 83   Temp 98.1  F (36.7  C) (Oral)   Resp 18   Ht 1.753 m (5' 9\")   Wt 84.3 kg (185 lb 14.4 oz)   SpO2 99%   BMI 27.45 kg/m  "     PHYSICAL EXAM    Physical Exam  Constitutional:       General: He is not in acute distress.  HENT:      Head: Normocephalic and atraumatic.      Mouth/Throat:      Pharynx: Oropharynx is clear.   Eyes:      Pupils: Pupils are equal, round, and reactive to light.   Cardiovascular:      Rate and Rhythm: Normal rate and regular rhythm.      Pulses: Normal pulses.           Dorsalis pedis pulses are 2+ on the right side and 2+ on the left side.      Heart sounds: Normal heart sounds.   Pulmonary:      Effort: Pulmonary effort is normal.      Breath sounds: Normal breath sounds.   Abdominal:      General: Abdomen is flat. Bowel sounds are normal.      Palpations: Abdomen is soft.      Tenderness: There is no abdominal tenderness.   Musculoskeletal:         General: Normal range of motion.      Lumbar back: Tenderness (right lower) present. No bony tenderness.   Skin:     General: Skin is warm and dry.      Capillary Refill: Capillary refill takes less than 2 seconds.   Neurological:      General: No focal deficit present.      Mental Status: He is alert and oriented to person, place, and time.      Sensory: Sensation is intact.      Motor: Motor function is intact.            I, Nancy Sahu, am serving as a scribe to document services personally performed by Dr. Anali Berger based on my observation and the provider's statements to me. Anali HARVEY DO attest that Nancy Sahu is acting in a scribe capacity, has observed my performance of the services and has documented them in accordance with my direction.    Anali Berger DO  Emergency Medicine  [facility] EMERGENCY DEPARTMENT  [facility] [facility] [id number] [phone]  Dept: [phone]       Anali Berger DO  10/12/23 [id number]

## 2023-10-12 NOTE — DISCHARGE INSTRUCTIONS
As discussed, your back and leg pain is from sciatica which could be from a herniated disk in your back irritating your nerve  Rest, ice or heat to back  Motrin 400 mg every 6 hours  Tylenol 1000 mg every 6 hours.  Do not exceed 4gm in one day  Medrol dose pack as prescribed to help with pain and inflammation.  This may increase your blood sugars related to your diabetes, keep close control of your diet and blood sugars during this time  Flexeril as needed for any muscle spasm  Follow-up closely with the spine center.  Please be aware that coverage of these services is subject to the terms and limitations of your health insurance plan.  Call member services at your health plan with any benefit or coverage questions.  Return immediately if unable to walk, unable to control your bladder or bowels  Fevers or any other concerns

## 2023-10-12 NOTE — ED TRIAGE NOTES
Pt arrives from home with complaints of back pain that radiates down right leg and into heal. Says bottom of foot is numb. Pt has hx of back pain but does not see anyone for it. Was able to work Mon-Wed, but pain is too unbearable now. Patient is wearing a wrap around back brace.  10/10 pain    Pt has taken ibuprofen but reports no relief. Last dose of 400mg was at 0900

## 2023-10-13 ENCOUNTER — OFFICE VISIT (OUTPATIENT)
Dept: FAMILY MEDICINE | Facility: CLINIC | Age: 38
End: 2023-10-13
Payer: COMMERCIAL

## 2023-10-13 VITALS
OXYGEN SATURATION: 99 % | SYSTOLIC BLOOD PRESSURE: 135 MMHG | HEART RATE: 78 BPM | BODY MASS INDEX: 27.02 KG/M2 | DIASTOLIC BLOOD PRESSURE: 85 MMHG | WEIGHT: 183 LBS

## 2023-10-13 DIAGNOSIS — E11.65 TYPE 2 DIABETES MELLITUS WITH HYPERGLYCEMIA, WITHOUT LONG-TERM CURRENT USE OF INSULIN (H): Primary | ICD-10-CM

## 2023-10-13 DIAGNOSIS — M54.16 LUMBAR RADICULOPATHY: ICD-10-CM

## 2023-10-13 LAB
CREAT UR-MCNC: 113 MG/DL
HBA1C MFR BLD: 7.6 % (ref 0–5.6)
MICROALBUMIN UR-MCNC: 29 MG/L
MICROALBUMIN/CREAT UR: 25.66 MG/G CR (ref 0–17)

## 2023-10-13 PROCEDURE — 99214 OFFICE O/P EST MOD 30 MIN: CPT | Mod: GC

## 2023-10-13 PROCEDURE — 80048 BASIC METABOLIC PNL TOTAL CA: CPT

## 2023-10-13 PROCEDURE — 82043 UR ALBUMIN QUANTITATIVE: CPT

## 2023-10-13 PROCEDURE — 80061 LIPID PANEL: CPT

## 2023-10-13 PROCEDURE — 82570 ASSAY OF URINE CREATININE: CPT

## 2023-10-13 PROCEDURE — 83036 HEMOGLOBIN GLYCOSYLATED A1C: CPT

## 2023-10-13 PROCEDURE — 36415 COLL VENOUS BLD VENIPUNCTURE: CPT

## 2023-10-13 RX ORDER — SENNOSIDES 8.6 MG
650 CAPSULE ORAL EVERY 8 HOURS PRN
Qty: 90 TABLET | Refills: 1 | Status: SHIPPED | OUTPATIENT
Start: 2023-10-13

## 2023-10-13 RX ORDER — NAPROXEN 500 MG/1
500 TABLET ORAL 2 TIMES DAILY WITH MEALS
Qty: 60 TABLET | Refills: 0 | Status: SHIPPED | OUTPATIENT
Start: 2023-10-13

## 2023-10-13 NOTE — PROGRESS NOTES
Assessment & Plan     Type 2 diabetes mellitus with hyperglycemia, without long-term current use of insulin (H)  A1c 8.1 six months ago. Currently on Jardiance 10 mg and metformin max dose. No side effects and not missing doses. Fasting sugars 140-150. Suspect A1c will still be above goal of <7. Discussed increasing Jardiance with patient and elected to proceed with this.  No microalbuminuria in 2022, will recheck today. Consider adding lisinopril the next visit. Today he is more concerned about his leg pain.   - HEMOGLOBIN A1C  - BASIC METABOLIC PANEL  - Lipid panel reflex to direct LDL Non-fasting  - Albumin Random Urine Quantitative with Creat Ratio  - empagliflozin (JARDIANCE) 25 MG TABS tablet  Dispense: 30 tablet; Refill: 2  - Consider adding lisinopril at next visit     Lumbar radiculopathy  Seen in ED yesterday 10/12 for radicular right leg pain. Travels from right lower back to foot posteriorly. No weakness on exam but does report decreased sensation. No red flag sx. Referral to spine given from ED, he has appt scheduled 10/25. In ED started on Flexeril and medrol dose pack. Will prescribe naproxen twice daily with meals and Tylenol per patient request.   - naproxen (NAPROSYN) 500 MG tablet  Dispense: 60 tablet; Refill: 0  - acetaminophen (TYLENOL) 650 MG CR tablet  Dispense: 90 tablet; Refill: 1  - work note for no work until 10/23 then return with restrictions until re-evaluated 10/25      Return in about 2 weeks (around 10/27/2023), or if symptoms worsen or fail to improve.    Lily Myles MD PGY3  [facility] Residency  10/13/23    I precepted today with Dr. Humphrey.      Tae Ying is a 37 year old, presenting for the following health issues:  Diabetes and Follow Up (To ED )        10/13/2023     2:55 PM   Additional Questions   Roomed by ngf   Accompanied by self         10/13/2023     2:55 PM   Patient Reported Additional Medications   Patient reports taking the following new  medications none       HPI     Last A1c was 8.1 six months ago. On Jardiance 10 mg and metformin 2000 mg daily. No side effects including no N/V or urinary complaints.  He checks BG in the morning. They are 140-150s. No hypoglycemic symptoms. Diabetic eye exam was done this year per patient at [facility].     He was seen in ED yesterday for right leg pain.     Per chart review, Patient reports atraumatic lower right back pain radiating through the back of his right leg into his foot since Monday. Denies history of back surgery or back pain. He has taken ibuprofen with no improvement. Pain is increased with standing up, sitting down, and twisting and during. Denies abdominal pain, dysuria, fever, chills, testicular pain, or additional symptoms or complaints at this time.      Patient states he sometimes has to do heavy lifting at work.   He works in assembly.    He was given a referral to spine from the ED and appt is scheduled for 10/25.  Given medrol dose pack and Flexeril in the ED. Flexeril makes him sleepy.   Pain today is rated 9 out of 10. Laying makes his pain better. Sitting and standing make it worse.   He is out of ibuprofen and Tylenol   Tried going to work earlier this week but it made his pain worse.   No changes to bowel or bladder, no saddle anesthesia. No fever or midline pain.     Review of Systems   Constitutional, HEENT, cardiovascular, pulmonary, gi and gu systems are negative, except as otherwise noted.      Objective    /85 (BP Location: Right arm, Patient Position: Sitting, Cuff Size: Adult Regular)   Pulse 78   Wt 83 kg (183 lb)   SpO2 99%   BMI 27.02 kg/m    Body mass index is 27.02 kg/m .  Physical Exam   GENERAL: alert and mild distress due to pain  EYES: Eyes grossly normal to inspection, EOMI  RESP: lungs clear to auscultation - no rales, rhonchi or wheezes  CV: regular rate and rhythm, normal S1 S2, no S3 or S4, no murmur  MSK: bilateral lower extremity strength 5/5, ankle reflex  intact   Neuro: straight leg test positive on right. Antalgic gait. Decreased sensation posterior lower leg  PSYCH: normal affect and mentation

## 2023-10-13 NOTE — LETTER
October 13, 2023      Re: Deonna VALLE [facility]      To whom it concerns,     Deonna was seen in clinic today 10/13/23 for leg pain that started on 10/9. He can return to work on 10/23 without restrictions or sooner if his symptoms resolve. If he continues to have debilitating pain, then he should be re-evaluated in clinic.      Sincerely,      Joan Myles MD  
October 13, 2023      Re: Deonna VALLE [facility]      To whom it concerns,     Deonna was seen in clinic today 10/13/23 for leg pain that started on 10/9. He can return to work on 10/23 with restrictions or no lifting more than 15 pounds. This restriction will be in place until he is re-evaluated on 10/25. He can return at any time without restrictions if his symptoms resolve. If he continues to have debilitating pain, then he should be re-evaluated in clinic sooner than 10/25.       Sincerely,      Joan Myles MD  
Alert and oriented, no focal deficits, no motor or sensory deficits.

## 2023-10-14 LAB
ANION GAP SERPL CALCULATED.3IONS-SCNC: 9 MMOL/L (ref 7–15)
BUN SERPL-MCNC: 22.8 MG/DL (ref 6–20)
CALCIUM SERPL-MCNC: 9.4 MG/DL (ref 8.6–10)
CHLORIDE SERPL-SCNC: 102 MMOL/L (ref 98–107)
CHOLEST SERPL-MCNC: 180 MG/DL
CREAT SERPL-MCNC: 0.87 MG/DL (ref 0.67–1.17)
DEPRECATED HCO3 PLAS-SCNC: 29 MMOL/L (ref 22–29)
EGFRCR SERPLBLD CKD-EPI 2021: >90 ML/MIN/1.73M2
GLUCOSE SERPL-MCNC: 249 MG/DL (ref 70–99)
HDLC SERPL-MCNC: 39 MG/DL
LDLC SERPL CALC-MCNC: 77 MG/DL
NONHDLC SERPL-MCNC: 141 MG/DL
POTASSIUM SERPL-SCNC: 4 MMOL/L (ref 3.4–5.3)
SODIUM SERPL-SCNC: 140 MMOL/L (ref 135–145)
TRIGL SERPL-MCNC: 319 MG/DL

## 2023-10-16 NOTE — PROGRESS NOTES
Preceptor Attestation:   I discussed the patient with the resident and evaluated the patient in person. I have verified the content of the note, which accurately reflects my assessment of the patient and the plan of care.         Bharath Humphrey MD

## 2023-10-19 ENCOUNTER — DOCUMENTATION ONLY (OUTPATIENT)
Dept: FAMILY MEDICINE | Facility: CLINIC | Age: 38
End: 2023-10-19
Payer: COMMERCIAL

## 2023-10-19 ENCOUNTER — TELEPHONE (OUTPATIENT)
Dept: FAMILY MEDICINE | Facility: CLINIC | Age: 38
End: 2023-10-19
Payer: COMMERCIAL

## 2023-10-19 NOTE — TELEPHONE ENCOUNTER
Patient is requesting a letter for next week as well. He said you told him you would extend the note for next week.      He told me this when I called him to giv him results from his labs          Mae SMITH

## 2023-10-23 ENCOUNTER — TELEPHONE (OUTPATIENT)
Dept: FAMILY MEDICINE | Facility: CLINIC | Age: 38
End: 2023-10-23
Payer: COMMERCIAL

## 2023-10-23 NOTE — TELEPHONE ENCOUNTER
Patient was provided work note through 10/25 which he has follow up appt at that time to be re-evaluated so no further note should be needed.     Lily Myles MD PGY3  [facility] Family Medicine Residency  10/23/23

## 2023-10-23 NOTE — TELEPHONE ENCOUNTER
Pt called requesting for 1 week extended work note. Pt preferred to  once it's done. Further question talk to Carter Sánchez.     Routing to Dr. Myles for review.     Dev Dale, RMA 3:29 PM October 23, 2023

## 2023-10-24 NOTE — PROGRESS NOTES
ASSESSMENT: Deonna Honeycutt is a 37 year old male who presents for consultation at the request of PCP Miryam Ruiz, with a past medical history significant for type 2 diabetes mellitus, hyperlipidemia who presents today for new patient evaluation of:    -Progressive right low back pain and right lumbar radiculopathy bring him to the ED on 10/12/2023, some benefit post Medrol Dosepak.    Patient is neurologically intact on exam. No myelopathic or red flag symptoms.          No data to display                     Diagnoses and all orders for this visit:  Acute right-sided low back pain with right-sided sciatica  -     Spine  Referral  -     MR Lumbar Spine w/o Contrast; Future  -     Physical Therapy Referral; Future  Right lumbar radiculopathy  -     MR Lumbar Spine w/o Contrast; Future  -     Physical Therapy Referral; Future  DDD (degenerative disc disease), lumbar  -     MR Lumbar Spine w/o Contrast; Future  -     Physical Therapy Referral; Future  Lumbar foraminal stenosis  -     MR Lumbar Spine w/o Contrast; Future  -     Physical Therapy Referral; Future    PLAN:  Reviewed spine anatomy and disease process. Discussed diagnosis and treatment options with the patient today. A shared decision making model was used.  The patient's values and choices were respected. The following represents what was discussed and decided upon by the provider and the patient.      -DIAGNOSTIC TESTS:  Images were personally reviewed and interpreted and explained to patient today using spine model.   -- Ordered lumbar spine MRI to further evaluate lumbar radiculopathy.  --CT abdomen pelvis 2019 does show degenerative disc height loss at L5-S1 with disc osteophyte complex and bilateral foraminal stenosis.    -PHYSICAL THERAPY: Referral to physical therapy placed to address home exercises for core strengthening and nerve glides.  Discussed the importance of core strengthening, ROM, stretching exercises with the patient and how each  of these entities is important in decreasing pain.  Explained to the patient that the purpose of physical therapy is to teach the patient a home exercise program.  These exercises need to be performed every day in order to decrease pain and prevent future occurrences of pain.        -MEDICATIONS: Did mechanically recommend gabapentin medication however patient wants to hold off and feels symptoms are fairly tolerable with naproxen.  Discussed multiple medication options today with patient. Discussed risks, side effects, and proper use of medications. Patient verbalized understanding.    -INTERVENTIONS: Did discuss that if symptoms or not improving with physical therapy we could trial a RIGHT lumbar OLIMPIA pending MRI review which he would be open to.  Discussed risks and benefits of injections with patient today.    -Workability note given today.    -PATIENT EDUCATION:  Total time of 46 minutes, on the day of service, spent with the patient, reviewing the chart, placing orders, and documenting.     -FOLLOW-UP:   Follow-up nurse call for imaging results and plan.    Advised patient to call the Spine Center if symptoms worsen or you have problems controlling bladder and bowel function.   ______________________________________________________________________    SUBJECTIVE:  HPI:  Deonna VALLE Yinka  Is a 37 year old male who presents today for new patient evaluation of low back pain right lumbosacral junction that radiates into the right buttock posterior thigh posterior lateral calf and left foot and lateral foot that became severe on 10/9/2023 bringing him to the ED on 10/12/2023.  Initially his pain was a 10/10 when he was in the ED.  He did get a Medrol Dosepak at that time and symptoms have improved currently pain today is a 6/10, a 1 at its best.  Denies lower extremity weakness.  Denies left leg symptoms.  Denies recent trips or falls or balance changes.  Denies recent trauma.  Denies bowel or bladder loss control, denies  saddle anesthesia.    -Treatment to Date: No prior spinal surgery or spinal injections.  No prior physical therapy or chiropractic treatments.    -Medications:  Naproxen 500 mg with benefit  Acetaminophen    *Medrol Dosepak 10/12/2023 from the ED for acute right sciatica with some benefit    Current Outpatient Medications   Medication    acetaminophen (TYLENOL) 650 MG CR tablet    naproxen (NAPROSYN) 500 MG tablet    alcohol swab prep pads    atorvastatin (LIPITOR) 20 MG tablet    blood glucose (ACCU-CHEK GUIDE) test strip    blood glucose (NO BRAND SPECIFIED) test strip    empagliflozin (JARDIANCE) 25 MG TABS tablet    metFORMIN (GLUCOPHAGE XR) 500 MG 24 hr tablet    thin (NO BRAND SPECIFIED) lancets     No current facility-administered medications for this visit.       No Known Allergies    Past Medical History:   Diagnosis Date    Type 2 diabetes mellitus (H)     Ureterolithiasis 07/12/2018        Patient Active Problem List   Diagnosis    Type 2 diabetes mellitus with hyperglycemia, without long-term current use of insulin (H)    Mixed dyslipidemia       Past Surgical History:   Procedure Laterality Date    HC CYSTOSCOPY,INSERT URETERAL STENT Left 09/25/2018    Procedure: CYSTOSCOPY LEFT URETEROSCOPY STENT INSERTION, INCISION OF URETER;  Surgeon: Corky Hercules MD;  Location: [facility];  Service: Urology    KIDNEY STONE SURGERY Left     2018    OTHER SURGICAL HISTORY      none    SURGICAL PATHOLOGY EXAM Left     Lipoma Removal       Family History   Problem Relation Age of Onset    Diabetes Father     Hypertension Father     Urolithiasis Father     Nephrolithiasis Mother     Urolithiasis Mother     Diabetes Sister     Cancer No family hx of        Reviewed past medical, surgical, and family history with patient found on new patient intake packet located in EMR Media tab.     SOCIAL HX: Patient is , works with [employer].  Patient denies smoking/tobacco use per denies alcohol use, denies  "history being heavy drinker.  Denies recreational drug use.    ROS: Positive for sciatica.  Specifically negative for bowel/bladder dysfunction, balance changes, headache, dizziness, foot drop, fevers, chills, appetite changes, nausea/vomiting, unexplained weight loss. Otherwise 13 systems reviewed are negative. Please see the patient's intake questionnaire from today for details.    OBJECTIVE:  /82 (BP Location: Right arm, Patient Position: Sitting)   Pulse 85   Ht 5' 9.5\" (1.765 m)   Wt 175 lb (79.4 kg)   SpO2 97%   BMI 25.47 kg/m      PHYSICAL EXAMINATION:    --CONSTITUTIONAL:  Vital signs as above.  No acute distress.  The patient is well nourished and well groomed.  --PSYCHIATRIC:  Appropriate mood and affect. The patient is awake, alert, oriented to person, place, time and answering questions appropriately with clear speech.    --SKIN:  Skin over the face, bilateral lower extremities, and posterior torso is clean, dry, intact without rashes.    --RESPIRATORY: Normal rhythm and effort. No abnormal accessory muscle breathing patterns noted.   --STANDING EXAMINATION:  Normal lumbar lordosis noted, no lateral shift.  --MUSCULOSKELETAL: Range of motion is not limited in lumbar flexion, extension, lateral rotation. No tenderness to palpation lumbar spine. Straight leg raising is negative to radicular pain on the left and positive on the right. Sciatic notch non-tender.  --GROSS MOTOR: Gait is non-antalgic. Easily arises from a seated position. Toe walking and heel walking are normal without significant difficulty.    --LOWER EXTREMITY MOTOR TESTING:  Plantar flexion left 5/5, right 5/5   Dorsiflexion left 5/5, right 5/5   Great toe MTP extension left 5/5, right 5/5  Knee flexion left 5/5, right 5/5  Knee extension left 5/5, right 5/5   Hip flexion left 5/5, right 5/5  Hip abduction left 5/5, right 5/5  Hip adduction left 5/5, right 5/5   --HIPS: Full range of motion bilaterally. Negative FABERs on the " involved lower extremity.   --NEUROLOGICAL:  2/4 patellar, medial hamstring, and achilles reflexes bilaterally.  Sensation to light touch is intact in the bilateral L4, L5, and S1 dermatomes. Babinski is negative. No clonus.  Negative Verna reflex bilaterally.  --VASCULAR:  Bilateral lower extremities are warm.  There is no pitting edema of the bilateral lower extremities.    RESULTS: Prior medical records from [facility] and [facility] Everywhere were reviewed today.    Imaging: Spine imaging was personally reviewed and interpreted today. The images were shown to the patient and the findings were explained using a spine model.      CT abdomen pelvis reviewed from 2019.

## 2023-10-25 ENCOUNTER — OFFICE VISIT (OUTPATIENT)
Dept: PHYSICAL MEDICINE AND REHAB | Facility: CLINIC | Age: 38
End: 2023-10-25
Attending: EMERGENCY MEDICINE
Payer: COMMERCIAL

## 2023-10-25 VITALS
BODY MASS INDEX: 25.05 KG/M2 | OXYGEN SATURATION: 97 % | DIASTOLIC BLOOD PRESSURE: 82 MMHG | HEART RATE: 85 BPM | HEIGHT: 70 IN | SYSTOLIC BLOOD PRESSURE: 131 MMHG | WEIGHT: 175 LBS

## 2023-10-25 DIAGNOSIS — M54.16 RIGHT LUMBAR RADICULOPATHY: ICD-10-CM

## 2023-10-25 DIAGNOSIS — M48.061 LUMBAR FORAMINAL STENOSIS: ICD-10-CM

## 2023-10-25 DIAGNOSIS — M51.369 DDD (DEGENERATIVE DISC DISEASE), LUMBAR: ICD-10-CM

## 2023-10-25 DIAGNOSIS — M54.41 ACUTE RIGHT-SIDED LOW BACK PAIN WITH RIGHT-SIDED SCIATICA: Primary | ICD-10-CM

## 2023-10-25 PROCEDURE — 99204 OFFICE O/P NEW MOD 45 MIN: CPT | Performed by: NURSE PRACTITIONER

## 2023-10-25 ASSESSMENT — PAIN SCALES - GENERAL: PAINLEVEL: SEVERE PAIN (6)

## 2023-10-25 NOTE — LETTER
10/25/2023         RE: Deonna Honeycutt  [address] [place] [address]        Dear Colleague,    Thank you for referring your patient, Deonna Honeycutt, to the [facility]. Please see a copy of my visit note below.    ASSESSMENT: Deonna Honeycutt is a 37 year old male who presents for consultation at the request of PCP Miryam Ruiz, with a past medical history significant for type 2 diabetes mellitus, hyperlipidemia who presents today for new patient evaluation of:    -Progressive right low back pain and right lumbar radiculopathy bring him to the ED on 10/12/2023, some benefit post Medrol Dosepak.    Patient is neurologically intact on exam. No myelopathic or red flag symptoms.          No data to display                     Diagnoses and all orders for this visit:  Acute right-sided low back pain with right-sided sciatica  -     Spine  Referral  -     MR Lumbar Spine w/o Contrast; Future  -     Physical Therapy Referral; Future  Right lumbar radiculopathy  -     MR Lumbar Spine w/o Contrast; Future  -     Physical Therapy Referral; Future  DDD (degenerative disc disease), lumbar  -     MR Lumbar Spine w/o Contrast; Future  -     Physical Therapy Referral; Future  Lumbar foraminal stenosis  -     MR Lumbar Spine w/o Contrast; Future  -     Physical Therapy Referral; Future    PLAN:  Reviewed spine anatomy and disease process. Discussed diagnosis and treatment options with the patient today. A shared decision making model was used.  The patient's values and choices were respected. The following represents what was discussed and decided upon by the provider and the patient.      -DIAGNOSTIC TESTS:  Images were personally reviewed and interpreted and explained to patient today using spine model.   -- Ordered lumbar spine MRI to further evaluate lumbar radiculopathy.  --CT abdomen pelvis 2019 does show degenerative disc height loss at L5-S1 with disc osteophyte complex and  bilateral foraminal stenosis.    -PHYSICAL THERAPY: Referral to physical therapy placed to address home exercises for core strengthening and nerve glides.  Discussed the importance of core strengthening, ROM, stretching exercises with the patient and how each of these entities is important in decreasing pain.  Explained to the patient that the purpose of physical therapy is to teach the patient a home exercise program.  These exercises need to be performed every day in order to decrease pain and prevent future occurrences of pain.        -MEDICATIONS: Did mechanically recommend gabapentin medication however patient wants to hold off and feels symptoms are fairly tolerable with naproxen.  Discussed multiple medication options today with patient. Discussed risks, side effects, and proper use of medications. Patient verbalized understanding.    -INTERVENTIONS: Did discuss that if symptoms or not improving with physical therapy we could trial a RIGHT lumbar OLIMPIA pending MRI review which he would be open to.  Discussed risks and benefits of injections with patient today.    -Workability note given today.    -PATIENT EDUCATION:  Total time of 46 minutes, on the day of service, spent with the patient, reviewing the chart, placing orders, and documenting.     -FOLLOW-UP:   Follow-up nurse call for imaging results and plan.    Advised patient to call the Spine Center if symptoms worsen or you have problems controlling bladder and bowel function.   ______________________________________________________________________    SUBJECTIVE:  HPI:  Lay D Yinka  Is a 37 year old male who presents today for new patient evaluation of low back pain right lumbosacral junction that radiates into the right buttock posterior thigh posterior lateral calf and left foot and lateral foot that became severe on 10/9/2023 bringing him to the ED on 10/12/2023.  Initially his pain was a 10/10 when he was in the ED.  He did get a Medrol Dosepak at that  time and symptoms have improved currently pain today is a 6/10, a 1 at its best.  Denies lower extremity weakness.  Denies left leg symptoms.  Denies recent trips or falls or balance changes.  Denies recent trauma.  Denies bowel or bladder loss control, denies saddle anesthesia.    -Treatment to Date: No prior spinal surgery or spinal injections.  No prior physical therapy or chiropractic treatments.    -Medications:  Naproxen 500 mg with benefit  Acetaminophen    *Medrol Dosepak 10/12/2023 from the ED for acute right sciatica with some benefit    Current Outpatient Medications   Medication     acetaminophen (TYLENOL) 650 MG CR tablet     naproxen (NAPROSYN) 500 MG tablet     alcohol swab prep pads     atorvastatin (LIPITOR) 20 MG tablet     blood glucose (ACCU-CHEK GUIDE) test strip     blood glucose (NO BRAND SPECIFIED) test strip     empagliflozin (JARDIANCE) 25 MG TABS tablet     metFORMIN (GLUCOPHAGE XR) 500 MG 24 hr tablet     thin (NO BRAND SPECIFIED) lancets     No current facility-administered medications for this visit.       No Known Allergies    Past Medical History:   Diagnosis Date     Type 2 diabetes mellitus (H)      Ureterolithiasis 07/12/2018        Patient Active Problem List   Diagnosis     Type 2 diabetes mellitus with hyperglycemia, without long-term current use of insulin (H)     Mixed dyslipidemia       Past Surgical History:   Procedure Laterality Date     HC CYSTOSCOPY,INSERT URETERAL STENT Left 09/25/2018    Procedure: CYSTOSCOPY LEFT URETEROSCOPY STENT INSERTION, INCISION OF URETER;  Surgeon: Corky Hercules MD;  Location: [facility];  Service: Urology     KIDNEY STONE SURGERY Left     2018     OTHER SURGICAL HISTORY      none     SURGICAL PATHOLOGY EXAM Left     Lipoma Removal       Family History   Problem Relation Age of Onset     Diabetes Father      Hypertension Father      Urolithiasis Father      Nephrolithiasis Mother      Urolithiasis Mother      Diabetes Sister       "Cancer No family hx of        Reviewed past medical, surgical, and family history with patient found on new patient intake packet located in EMR Media tab.     SOCIAL HX: Patient is , works with [employer].  Patient denies smoking/tobacco use per denies alcohol use, denies history being heavy drinker.  Denies recreational drug use.    ROS: Positive for sciatica.  Specifically negative for bowel/bladder dysfunction, balance changes, headache, dizziness, foot drop, fevers, chills, appetite changes, nausea/vomiting, unexplained weight loss. Otherwise 13 systems reviewed are negative. Please see the patient's intake questionnaire from today for details.    OBJECTIVE:  /82 (BP Location: Right arm, Patient Position: Sitting)   Pulse 85   Ht 5' 9.5\" (1.765 m)   Wt 175 lb (79.4 kg)   SpO2 97%   BMI 25.47 kg/m      PHYSICAL EXAMINATION:    --CONSTITUTIONAL:  Vital signs as above.  No acute distress.  The patient is well nourished and well groomed.  --PSYCHIATRIC:  Appropriate mood and affect. The patient is awake, alert, oriented to person, place, time and answering questions appropriately with clear speech.    --SKIN:  Skin over the face, bilateral lower extremities, and posterior torso is clean, dry, intact without rashes.    --RESPIRATORY: Normal rhythm and effort. No abnormal accessory muscle breathing patterns noted.   --STANDING EXAMINATION:  Normal lumbar lordosis noted, no lateral shift.  --MUSCULOSKELETAL: Range of motion is not limited in lumbar flexion, extension, lateral rotation. No tenderness to palpation lumbar spine. Straight leg raising is negative to radicular pain on the left and positive on the right. Sciatic notch non-tender.  --GROSS MOTOR: Gait is non-antalgic. Easily arises from a seated position. Toe walking and heel walking are normal without significant difficulty.    --LOWER EXTREMITY MOTOR TESTING:  Plantar flexion left 5/5, right 5/5   Dorsiflexion left 5/5, right 5/5 "   Great toe MTP extension left 5/5, right 5/5  Knee flexion left 5/5, right 5/5  Knee extension left 5/5, right 5/5   Hip flexion left 5/5, right 5/5  Hip abduction left 5/5, right 5/5  Hip adduction left 5/5, right 5/5   --HIPS: Full range of motion bilaterally. Negative FABERs on the involved lower extremity.   --NEUROLOGICAL:  2/4 patellar, medial hamstring, and achilles reflexes bilaterally.  Sensation to light touch is intact in the bilateral L4, L5, and S1 dermatomes. Babinski is negative. No clonus.  Negative Verna reflex bilaterally.  --VASCULAR:  Bilateral lower extremities are warm.  There is no pitting edema of the bilateral lower extremities.    RESULTS: Prior medical records from [facility] and Care Everywhere were reviewed today.    Imaging: Spine imaging was personally reviewed and interpreted today. The images were shown to the patient and the findings were explained using a spine model.      CT abdomen pelvis reviewed from 2019.             Again, thank you for allowing me to participate in the care of your patient.        Sincerely,        Africa Perez, CNP

## 2023-10-25 NOTE — TELEPHONE ENCOUNTER
Encounter no longer needed. New work letter written by  already today.    Dev Dale, RMA 11:06 AM October 25, 2023

## 2023-10-25 NOTE — PATIENT INSTRUCTIONS
~Spine Center Scheduling #[phone].  ~Please call our [facility] Nurse Navigation #[phone] with any questions or concerns about your treatment plan, if symptoms worsen and you would like to be seen urgently, or if you have problems controlling bladder and bowel function.  ~For any future flareups or new symptoms, patients must follow-up in clinic or contact the nurse navigator line.  ~Please note that any My Chart messages may take multiple days for a response due to the high volume of patients seen in clinic.  Anything sent Thursday night or after will be answered the following week when able, as Africa Perez CNP does not work in clinic on Fridays.   ~Africa Perez CNP is at the [facility] on the first and third Tuesdays of the month only, otherwise primarily at the [facility].        ~You have been referred for Physical Therapy to [facility]. They will call you to schedule an appointment.      Scheduling phone number is [phone] for [facility] [place], [place], or [place] location.  If you have not heard from the scheduling office within 2 business days, please call [phone] for ALL other locations.    Discussed the importance of core strengthening, ROM, stretching exercises and how each of these entities is important in decreasing pain and improving long term spine health.  The purpose of physical therapy is to teach you an individualized home exercise program.  These exercises need to be performed every day in order to decrease pain and prevent future occurrences of pain.           Imaging has been ordered. Radiology will call you to schedule. Please call below if you do not hear from them in the next couple of days.     [facility] Scheduling    Please call [phone] to schedule your image(s) (select option #1). There are 3 different locations, see below.       [address]  [facility] [address]    [address], [facility] [address]   [facility] [address]    [place] [id number] [place] [id number]

## 2023-10-25 NOTE — LETTER
October 25, 2023      Deonna Honeycutt  [address] [place] [address]        To Whom It May Concern:    Deonna Honeycutt was seen on 10/25/2023 and prior to that in the ED on 10/12/2023.  Please excuse him from 10/12/2023 through 10/29/2023.  Recommend return to work with restrictions 10/30/2023 with 20 pound lifting limit, limiting bending and twisting as tolerated.  Restrictions x2 weeks through 11/13/2023.        Sincerely,        Africa Perez, CNP

## 2023-11-09 ENCOUNTER — THERAPY VISIT (OUTPATIENT)
Dept: PHYSICAL THERAPY | Facility: REHABILITATION | Age: 38
End: 2023-11-09
Attending: NURSE PRACTITIONER
Payer: COMMERCIAL

## 2023-11-09 DIAGNOSIS — M54.16 RIGHT LUMBAR RADICULOPATHY: ICD-10-CM

## 2023-11-09 DIAGNOSIS — M51.369 DDD (DEGENERATIVE DISC DISEASE), LUMBAR: ICD-10-CM

## 2023-11-09 DIAGNOSIS — M48.061 LUMBAR FORAMINAL STENOSIS: ICD-10-CM

## 2023-11-09 DIAGNOSIS — M54.41 ACUTE RIGHT-SIDED LOW BACK PAIN WITH RIGHT-SIDED SCIATICA: Primary | ICD-10-CM

## 2023-11-09 PROCEDURE — 97161 PT EVAL LOW COMPLEX 20 MIN: CPT | Mod: GP | Performed by: PHYSICAL THERAPIST

## 2023-11-09 PROCEDURE — 97110 THERAPEUTIC EXERCISES: CPT | Mod: GP | Performed by: PHYSICAL THERAPIST

## 2023-11-09 NOTE — PROGRESS NOTES
"PHYSICAL THERAPY EVALUATION  Type of Visit: Evaluation    See electronic medical record for Abuse and Falls Screening details.    Subjective       Presenting condition or subjective complaint:    Following from referring provider note 10/25/23  \"Deonna Honeycutt  Is a 37 year old male who presents today for new patient evaluation of low back pain right lumbosacral junction that radiates into the right buttock posterior thigh posterior lateral calf and left foot and lateral foot that became severe on 10/9/2023 bringing him to the ED on 10/12/2023.  Initially his pain was a 10/10 when he was in the ED.  He did get a Medrol Dosepak at that time and symptoms have improved currently pain today is a 6/10, a 1 at its best.  Denies lower extremity weakness.  Denies left leg symptoms.  Denies recent trips or falls or balance changes.  Denies recent trauma.  Denies bowel or bladder loss control, denies saddle anesthesia.\"  No history of back pain otherwise.   Today, patient report no history of back pain prior to this, Reports the pain is much better, still some pain trying to straightening out his leg.He is back at work currently, went back about a week ago. 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  Pain described as currently in the right lower hip area achy pain, from the toes to the knee on the lateral side is still a little numb, otherwise not traveling down the leg anymore. Mild weakness in the right leg, no pain on left. No change in balance, no change in bowel/bladder control.  Worse with straightening out his leg, doing okay with lifting and bending, prolonged sitting.   Better with medication, time, moving his leg more frequently.     Date of onset: 10/12/23    Relevant medical history:     Dates & types of surgery:      Prior diagnostic imaging/testing results:       Prior therapy history for the same diagnosis, illness or injury:        Prior Level of Function  Transfers: "   Ambulation:   ADL:   IADL:     Living Environment  Social support:     Type of home:     Stairs to enter the home:         Ramp:     Stairs inside the home:         Help at home:    Equipment owned:       Employment:      Hobbies/Interests:      Patient goals for therapy:      Pain assessment: Pain present     Objective   Precautions/Restrictions: none  Involved side: right  Posture Observation:      Patient sits in slouched position    Lumbar ROM:    Date: 11/9/2023     *Indicate scale AROM AROM AROM   Lumbar Flexion 32 cm LBP on right     Lumbar Extension WNL      Right Left Right Left Right Left   Lumbar Sidebending WNL WNL       Lumbar Rotation WNL WNL       Thoracic Rotation           Lower Extremity Strength:     Date: 11/9/2023     LE strength/5 Right Left Right Left Right Left   Hip Flexion (L1-3) 5 5       Hip Extension (L5-S1) 4 5       Hip Abduction (L4-5) 5 5       Hip Adduction (L2-3)         Hip External Rotation         Hip Internal Rotation         Knee Extension (L3-4) 5 5       Knee Flexion 5 5       Ankle Dorsiflexion (L4-5) 5 5       Great Toe Extension (L5) 5 5       Ankle Plantar flexion (S1) 10 reps 15 reps        Abdominals        Sensation    intact to lt touch sensation screen    Reflex Testing  Lumbar Dermatomes Right Left UE Reflexes Right Left   Iliac Crest and Groin (L1)   Biceps (C5-6)     Anterior Medial Thigh (L2)   Brachioradialis (C5-6)     Anterior Thigh, Medial Epicondyle Femur (L3)   Triceps (C7-8)     Lateral Thigh, Anterior Knee, Medial Leg/Malleolus (L4)   Verna's test     Lateral Leg, Dorsal Foot (L5)   LE Reflexes     Lateral Foot (S1)   Patellar (L3-4)     Posterior Leg (S2)   Achilles (S1-2)     Other:   Babinski Response       Palpation: non-tender to palpation of lumbar or hip musculature    Lumbar Special Tests:     Lumbar Special Tests Right Left SI Tests Right  Left   Quadrant test   SI Compression     Straight leg raise 60 + 75 - SI Distraction     Crossover  response   POSH/Thigh Thrust Test - -   Slump + - Sacral Thrust - -   Sit-up test  Gaenslen's     Trunk extensor endurance test  FADIR - -   Prone instability test  DANYELLE - -   Pubic shotgun  Rachna's - -     Repeated Motion Testing:  Dec pain in LE with improvements in SLR to 80 degrees and subjectively felt stronger with heel raises after repeated extensions    Passive Mobility - Joint Integrity:  WNL- painfree    Assessment & Plan   CLINICAL IMPRESSIONS  Medical Diagnosis: Acute right-sided low back pain with right-sided sciatica  Right lumbar radiculopathy  DDD (degenerative disc disease), lumbar  Lumbar foraminal stenosis    Treatment Diagnosis: low back pain, lumbar derangement with direction preference to extension, LE neural tension, S1 myotomal  weakness   Impression/Assessment: Patient is a 37 year old male with right sided low back with RLE pain complaints, along with right gastroc weakness.  The following significant findings have been identified: Pain, Decreased ROM/flexibility, and Decreased strength. These impairments interfere with their ability to perform work tasks, recreational activities, household chores, and community mobility as compared to previous level of function. Pain consistent with lumbar derangement with directional preference for extension.    Clinical Decision Making (Complexity):  Clinical Presentation: Stable/Uncomplicated  Clinical Presentation Rationale: based on medical and personal factors listed in PT evaluation  Clinical Decision Making (Complexity): Low complexity    PLAN OF CARE  Treatment Interventions:  Interventions: Manual Therapy, Neuromuscular Re-education, Therapeutic Activity, Therapeutic Exercise, Self-Care/Home Management    Long Term Goals     PT Goal 1  Goal Description: Patient will be able to straighten out his leg without + neural tension in 4 weeks:  Target Date: 12/07/23  PT Goal 2  Goal Description: Patient will be able to demonstrate gastroc strength at a  5 with > 15 heel raises, for improved strength and as a sign of healing in 8 weeks:  Target Date: 01/04/24  PT Goal 3  Goal Description: Patient will demonstrate understaanding of HEP to manage symptoms and reduce pain, reduce risk of future flare, in 6 weeks:  Target Date: 12/21/23      Frequency of Treatment: 1X/week  Duration of Treatment: Up to 8 visits over 12 weeks:    Recommended Referrals to Other Professionals:   Education Assessment:   Learner/Method: Patient  Education Comments: PT POC, HEP    Risks and benefits of evaluation/treatment have been explained.   Patient/Family/caregiver agrees with Plan of Care.     Evaluation Time:     PT Eval, Low Complexity Minutes (58822): 15       Signing Clinician: To Carr, VALERIE      [facility]                                                                                   OUTPATIENT PHYSICAL THERAPY      PLAN OF TREATMENT FOR OUTPATIENT REHABILITATION   Patient's Last Name, First Name, Deonna Amor YOB: 1985   Provider's Name   [facility]   Medical Record No.  [id number]     Onset Date: 10/12/23  Start of Care Date: 11/09/23     Medical Diagnosis:  Acute right-sided low back pain with right-sided sciatica  Right lumbar radiculopathy  DDD (degenerative disc disease), lumbar  Lumbar foraminal stenosis      PT Treatment Diagnosis:  low back pain, lumbar derangement with direction preference to extension, LE neural tension, S1 myotomal  weakness Plan of Treatment  Frequency/Duration: 1X/week/ Up to 8 visits over 12 weeks:    Certification date from 11/09/23 to 02/01/24         See note for plan of treatment details and functional goals     To Carr, PT                         I CERTIFY THE NEED FOR THESE SERVICES FURNISHED UNDER        THIS PLAN OF TREATMENT AND WHILE UNDER MY CARE     (Physician attestation of this document indicates review and certification of the  therapy plan).                Referring Provider:  Africa Perez      Initial Assessment  See Epic Evaluation- Start of Care Date: 11/09/23

## 2023-11-09 NOTE — LETTER
November 9, 2023      Deonna Honeycutt  [address] [place] [address]        To Whom It May Concern:    Deonna Honeycutt  was seen on 11/9/23 for a physical therapy appointment.  Please excuse him for any time missed due to this appointment.        Sincerely,        To Carr, PT

## 2023-11-11 ENCOUNTER — HOSPITAL ENCOUNTER (OUTPATIENT)
Dept: MRI IMAGING | Facility: HOSPITAL | Age: 38
Discharge: HOME | End: 2023-11-11
Attending: NURSE PRACTITIONER | Admitting: NURSE PRACTITIONER
Payer: COMMERCIAL

## 2023-11-11 DIAGNOSIS — M54.41 ACUTE RIGHT-SIDED LOW BACK PAIN WITH RIGHT-SIDED SCIATICA: ICD-10-CM

## 2023-11-11 DIAGNOSIS — M54.16 RIGHT LUMBAR RADICULOPATHY: ICD-10-CM

## 2023-11-11 DIAGNOSIS — M48.061 LUMBAR FORAMINAL STENOSIS: ICD-10-CM

## 2023-11-11 DIAGNOSIS — M51.369 DDD (DEGENERATIVE DISC DISEASE), LUMBAR: ICD-10-CM

## 2023-11-11 PROCEDURE — 72148 MRI LUMBAR SPINE W/O DYE: CPT

## 2023-11-13 ENCOUNTER — TELEPHONE (OUTPATIENT)
Dept: PHYSICAL MEDICINE AND REHAB | Facility: CLINIC | Age: 38
End: 2023-11-13
Payer: COMMERCIAL

## 2023-11-13 NOTE — TELEPHONE ENCOUNTER
----- Message from Africa Perez CNP sent at 11/13/2023 10:14 AM CST -----  Please call patient and notify him that I did review his MRI which shows chronic disc bulging and bone spurring at L5-S1 on the left with left L5 nerve compression.  There is more mild appearing disc bulge on the right at L5-S1 with mild right S1 nerve compression.  If his pain is tolerable at this time I would recommend further physical therapy, it does look like he completed 1 session so far.  If his pain is still significant we could trial a right L5-S1 and S1-S2 TFESI at any time to try and calm down symptoms as well but physical therapy is the most important long-term treatment at this time.  Thanks,  Africa

## 2023-11-16 ENCOUNTER — THERAPY VISIT (OUTPATIENT)
Dept: PHYSICAL THERAPY | Facility: CLINIC | Age: 38
End: 2023-11-16
Payer: COMMERCIAL

## 2023-11-16 DIAGNOSIS — M54.16 RIGHT LUMBAR RADICULOPATHY: ICD-10-CM

## 2023-11-16 DIAGNOSIS — M51.369 DDD (DEGENERATIVE DISC DISEASE), LUMBAR: ICD-10-CM

## 2023-11-16 DIAGNOSIS — M48.061 LUMBAR FORAMINAL STENOSIS: ICD-10-CM

## 2023-11-16 DIAGNOSIS — M54.41 ACUTE RIGHT-SIDED LOW BACK PAIN WITH RIGHT-SIDED SCIATICA: Primary | ICD-10-CM

## 2023-11-16 PROCEDURE — 97110 THERAPEUTIC EXERCISES: CPT | Mod: GP | Performed by: PHYSICAL THERAPIST

## 2023-11-29 ENCOUNTER — THERAPY VISIT (OUTPATIENT)
Dept: PHYSICAL THERAPY | Facility: CLINIC | Age: 38
End: 2023-11-29
Payer: COMMERCIAL

## 2023-11-29 DIAGNOSIS — M51.369 DDD (DEGENERATIVE DISC DISEASE), LUMBAR: ICD-10-CM

## 2023-11-29 DIAGNOSIS — M54.41 ACUTE RIGHT-SIDED LOW BACK PAIN WITH RIGHT-SIDED SCIATICA: Primary | ICD-10-CM

## 2023-11-29 DIAGNOSIS — M48.061 LUMBAR FORAMINAL STENOSIS: ICD-10-CM

## 2023-11-29 DIAGNOSIS — M54.16 RIGHT LUMBAR RADICULOPATHY: ICD-10-CM

## 2023-11-29 PROCEDURE — 97110 THERAPEUTIC EXERCISES: CPT | Mod: GP | Performed by: PHYSICAL THERAPIST

## 2023-11-30 NOTE — TELEPHONE ENCOUNTER
Pt returned call. Utilized assistance of [facility]  to relay results and recommendations. Pt stated understanding. He is going to continue with the physical therapy for now and will call if symptoms do not improve/worsen.

## 2023-11-30 NOTE — TELEPHONE ENCOUNTER
No response from patient. Second call to discuss results and recommendations with assistance of [facility]  # [phone] Mary from [employer]. Left message to return call.

## 2023-11-30 NOTE — TELEPHONE ENCOUNTER
Patient had returned call and left message on nurse navigation line. Called him back but went to voicemail. Left message to return call.

## 2024-02-09 NOTE — PROGRESS NOTES
DISCHARGE  Reason for Discharge: Patient has met all goals.    Equipment Issued: NA    Discharge Plan: Patient to continue home program.    Referring Provider:  Africa Perez

## 2024-06-02 DIAGNOSIS — E11.65 TYPE 2 DIABETES MELLITUS WITH HYPERGLYCEMIA, WITHOUT LONG-TERM CURRENT USE OF INSULIN (H): ICD-10-CM

## 2024-06-03 ENCOUNTER — TELEPHONE (OUTPATIENT)
Dept: FAMILY MEDICINE | Facility: CLINIC | Age: 39
End: 2024-06-03
Payer: COMMERCIAL

## 2024-06-03 RX ORDER — METFORMIN HCL 500 MG
TABLET, EXTENDED RELEASE 24 HR ORAL
Qty: 120 TABLET | Refills: 11 | OUTPATIENT
Start: 2024-06-03

## 2024-06-03 NOTE — TELEPHONE ENCOUNTER
Name from pharmacy: METFORMIN HCL  MG TB2 500 Tablet          Will file in chart as: metFORMIN (GLUCOPHAGE XR) 500 MG 24 hr tablet    Sig: TAKE 2 TABLETS BY MOUTH IN THE MORNING AND 2 TABLETS IN THE EVENING AFTER THAT WITH MEALS    Disp: 120 tablet    Refills: 11    Start: 6/2/2024    Class: E-Prescribe    Non-formulary For: Type 2 diabetes mellitus with hyperglycemia, without long-term current use of insulin (H)    Last ordered: 1 year ago (3/28/2023) by Jelani Ramos MD    Last refill: 2/27/2024    Rx #: [id number]    Biguanide Agents Vbilly2306/02/2024 10:28 AM   Protocol Details Patient has documented A1c within the specified period of time.    Recent (6 mo) or future (90 days) visit within the authorizing provider's specialty        AISHA De Jesus, BSN

## 2024-06-03 NOTE — TELEPHONE ENCOUNTER
Talked to pt, pt said that he does not have insurance right now and couldn't picked up his medication. So, he wont be able to make an appt.  ( It was hard to hear the  because the pt was at work and the machines was to loud )      Jessica Kam MA          ----- Message from Dari Robles MD sent at 6/3/2024 11:04 AM CDT -----  Please have patient make appt for Metformin refill. Thanks, Lily SAMANIEGO

## 2025-01-08 ENCOUNTER — HOSPITAL ENCOUNTER (INPATIENT)
Facility: HOSPITAL | Age: 40
LOS: 1 days | Discharge: HOME | End: 2025-01-09
Attending: EMERGENCY MEDICINE | Admitting: HOSPITALIST
Payer: COMMERCIAL

## 2025-01-08 ENCOUNTER — APPOINTMENT (OUTPATIENT)
Dept: CT IMAGING | Facility: HOSPITAL | Age: 40
End: 2025-01-08
Attending: EMERGENCY MEDICINE
Payer: COMMERCIAL

## 2025-01-08 DIAGNOSIS — R65.20 SEVERE SEPSIS (H): ICD-10-CM

## 2025-01-08 DIAGNOSIS — A41.9 SEVERE SEPSIS (H): ICD-10-CM

## 2025-01-08 DIAGNOSIS — N12 PYELONEPHRITIS: ICD-10-CM

## 2025-01-08 DIAGNOSIS — E87.20 LACTIC ACIDOSIS: ICD-10-CM

## 2025-01-08 DIAGNOSIS — D72.829 LEUKOCYTOSIS, UNSPECIFIED TYPE: ICD-10-CM

## 2025-01-08 DIAGNOSIS — R50.9 FEVER, UNSPECIFIED FEVER CAUSE: ICD-10-CM

## 2025-01-08 DIAGNOSIS — R00.0 SINUS TACHYCARDIA: ICD-10-CM

## 2025-01-08 DIAGNOSIS — E11.65 TYPE 2 DIABETES MELLITUS WITH HYPERGLYCEMIA, WITHOUT LONG-TERM CURRENT USE OF INSULIN (H): Primary | ICD-10-CM

## 2025-01-08 LAB
ALBUMIN UR-MCNC: NEGATIVE MG/DL
ANION GAP SERPL CALCULATED.3IONS-SCNC: 15 MMOL/L (ref 7–15)
APPEARANCE UR: CLEAR
BASOPHILS # BLD AUTO: 0.1 10E3/UL (ref 0–0.2)
BASOPHILS NFR BLD AUTO: 0 %
BILIRUB UR QL STRIP: NEGATIVE
BUN SERPL-MCNC: 10 MG/DL (ref 6–20)
CALCIUM SERPL-MCNC: 9.9 MG/DL (ref 8.8–10.4)
CHLORIDE SERPL-SCNC: 94 MMOL/L (ref 98–107)
COLOR UR AUTO: ABNORMAL
CREAT SERPL-MCNC: 0.79 MG/DL (ref 0.67–1.17)
CRP SERPL-MCNC: 65.4 MG/L
EGFRCR SERPLBLD CKD-EPI 2021: >90 ML/MIN/1.73M2
EOSINOPHIL # BLD AUTO: 0.1 10E3/UL (ref 0–0.7)
EOSINOPHIL NFR BLD AUTO: 0 %
ERYTHROCYTE [DISTWIDTH] IN BLOOD BY AUTOMATED COUNT: 11.3 % (ref 10–15)
GLUCOSE BLDC GLUCOMTR-MCNC: 306 MG/DL (ref 70–99)
GLUCOSE SERPL-MCNC: 341 MG/DL (ref 70–99)
GLUCOSE UR STRIP-MCNC: >1000 MG/DL
HCO3 SERPL-SCNC: 24 MMOL/L (ref 22–29)
HCT VFR BLD AUTO: 45.2 % (ref 40–53)
HGB BLD-MCNC: 15.7 G/DL (ref 13.3–17.7)
HGB UR QL STRIP: NEGATIVE
HOLD SPECIMEN: NORMAL
IMM GRANULOCYTES # BLD: 0.1 10E3/UL
IMM GRANULOCYTES NFR BLD: 0 %
KETONES UR STRIP-MCNC: NEGATIVE MG/DL
LACTATE SERPL-SCNC: 2.5 MMOL/L (ref 0.7–2)
LEUKOCYTE ESTERASE UR QL STRIP: ABNORMAL
LYMPHOCYTES # BLD AUTO: 1.8 10E3/UL (ref 0.8–5.3)
LYMPHOCYTES NFR BLD AUTO: 10 %
MCH RBC QN AUTO: 30.5 PG (ref 26.5–33)
MCHC RBC AUTO-ENTMCNC: 34.7 G/DL (ref 31.5–36.5)
MCV RBC AUTO: 88 FL (ref 78–100)
MONOCYTES # BLD AUTO: 0.9 10E3/UL (ref 0–1.3)
MONOCYTES NFR BLD AUTO: 5 %
NEUTROPHILS # BLD AUTO: 14.3 10E3/UL (ref 1.6–8.3)
NEUTROPHILS NFR BLD AUTO: 83 %
NITRATE UR QL: NEGATIVE
NRBC # BLD AUTO: 0 10E3/UL
NRBC BLD AUTO-RTO: 0 /100
PH UR STRIP: 7.5 [PH] (ref 5–7)
PLATELET # BLD AUTO: 285 10E3/UL (ref 150–450)
POTASSIUM SERPL-SCNC: 4.4 MMOL/L (ref 3.4–5.3)
RBC # BLD AUTO: 5.15 10E6/UL (ref 4.4–5.9)
RBC URINE: 1 /HPF
SODIUM SERPL-SCNC: 133 MMOL/L (ref 135–145)
SP GR UR STRIP: 1.03 (ref 1–1.03)
UROBILINOGEN UR STRIP-MCNC: <2 MG/DL
WBC # BLD AUTO: 17.2 10E3/UL (ref 4–11)
WBC URINE: 33 /HPF

## 2025-01-08 PROCEDURE — 83605 ASSAY OF LACTIC ACID: CPT | Performed by: EMERGENCY MEDICINE

## 2025-01-08 PROCEDURE — 99223 1ST HOSP IP/OBS HIGH 75: CPT | Performed by: INTERNAL MEDICINE

## 2025-01-08 PROCEDURE — 250N000011 HC RX IP 250 OP 636: Performed by: EMERGENCY MEDICINE

## 2025-01-08 PROCEDURE — 96366 THER/PROPH/DIAG IV INF ADDON: CPT

## 2025-01-08 PROCEDURE — 120N000001 HC R&B MED SURG/OB

## 2025-01-08 PROCEDURE — 82962 GLUCOSE BLOOD TEST: CPT

## 2025-01-08 PROCEDURE — 96365 THER/PROPH/DIAG IV INF INIT: CPT

## 2025-01-08 PROCEDURE — 87186 SC STD MICRODIL/AGAR DIL: CPT | Performed by: EMERGENCY MEDICINE

## 2025-01-08 PROCEDURE — 85014 HEMATOCRIT: CPT | Performed by: EMERGENCY MEDICINE

## 2025-01-08 PROCEDURE — 82310 ASSAY OF CALCIUM: CPT | Performed by: EMERGENCY MEDICINE

## 2025-01-08 PROCEDURE — 86140 C-REACTIVE PROTEIN: CPT | Performed by: EMERGENCY MEDICINE

## 2025-01-08 PROCEDURE — 250N000013 HC RX MED GY IP 250 OP 250 PS 637: Performed by: EMERGENCY MEDICINE

## 2025-01-08 PROCEDURE — 99291 CRITICAL CARE FIRST HOUR: CPT | Mod: 25

## 2025-01-08 PROCEDURE — 74176 CT ABD & PELVIS W/O CONTRAST: CPT

## 2025-01-08 PROCEDURE — 85025 COMPLETE CBC W/AUTO DIFF WBC: CPT | Performed by: EMERGENCY MEDICINE

## 2025-01-08 PROCEDURE — 83036 HEMOGLOBIN GLYCOSYLATED A1C: CPT | Performed by: HOSPITALIST

## 2025-01-08 PROCEDURE — 81001 URINALYSIS AUTO W/SCOPE: CPT | Performed by: EMERGENCY MEDICINE

## 2025-01-08 PROCEDURE — 36415 COLL VENOUS BLD VENIPUNCTURE: CPT | Performed by: EMERGENCY MEDICINE

## 2025-01-08 PROCEDURE — 96375 TX/PRO/DX INJ NEW DRUG ADDON: CPT

## 2025-01-08 PROCEDURE — 87088 URINE BACTERIA CULTURE: CPT | Performed by: EMERGENCY MEDICINE

## 2025-01-08 PROCEDURE — 80048 BASIC METABOLIC PNL TOTAL CA: CPT | Performed by: EMERGENCY MEDICINE

## 2025-01-08 PROCEDURE — 87040 BLOOD CULTURE FOR BACTERIA: CPT | Performed by: EMERGENCY MEDICINE

## 2025-01-08 PROCEDURE — 258N000003 HC RX IP 258 OP 636: Performed by: EMERGENCY MEDICINE

## 2025-01-08 RX ORDER — ONDANSETRON 2 MG/ML
4 INJECTION INTRAMUSCULAR; INTRAVENOUS EVERY 6 HOURS PRN
Status: DISCONTINUED | OUTPATIENT
Start: 2025-01-08 | End: 2025-01-09 | Stop reason: HOSPADM

## 2025-01-08 RX ORDER — SODIUM CHLORIDE 9 MG/ML
INJECTION, SOLUTION INTRAVENOUS CONTINUOUS
Status: DISCONTINUED | OUTPATIENT
Start: 2025-01-09 | End: 2025-01-09

## 2025-01-08 RX ORDER — CEFTRIAXONE 1 G/1
1 INJECTION, POWDER, FOR SOLUTION INTRAMUSCULAR; INTRAVENOUS ONCE
Status: COMPLETED | OUTPATIENT
Start: 2025-01-08 | End: 2025-01-08

## 2025-01-08 RX ORDER — KETOROLAC TROMETHAMINE 15 MG/ML
15 INJECTION, SOLUTION INTRAMUSCULAR; INTRAVENOUS ONCE
Status: COMPLETED | OUTPATIENT
Start: 2025-01-08 | End: 2025-01-08

## 2025-01-08 RX ORDER — ONDANSETRON 4 MG/1
4 TABLET, ORALLY DISINTEGRATING ORAL EVERY 6 HOURS PRN
Status: DISCONTINUED | OUTPATIENT
Start: 2025-01-08 | End: 2025-01-09 | Stop reason: HOSPADM

## 2025-01-08 RX ORDER — HYDROMORPHONE HCL IN WATER/PF 6 MG/30 ML
0.2 PATIENT CONTROLLED ANALGESIA SYRINGE INTRAVENOUS
Status: DISCONTINUED | OUTPATIENT
Start: 2025-01-08 | End: 2025-01-09 | Stop reason: HOSPADM

## 2025-01-08 RX ORDER — ACETAMINOPHEN 325 MG/1
975 TABLET ORAL ONCE
Status: COMPLETED | OUTPATIENT
Start: 2025-01-08 | End: 2025-01-08

## 2025-01-08 RX ORDER — HYDROMORPHONE HCL IN WATER/PF 6 MG/30 ML
0.4 PATIENT CONTROLLED ANALGESIA SYRINGE INTRAVENOUS
Status: DISCONTINUED | OUTPATIENT
Start: 2025-01-08 | End: 2025-01-09 | Stop reason: HOSPADM

## 2025-01-08 RX ORDER — LIDOCAINE 40 MG/G
CREAM TOPICAL
Status: DISCONTINUED | OUTPATIENT
Start: 2025-01-08 | End: 2025-01-09 | Stop reason: HOSPADM

## 2025-01-08 RX ORDER — PIPERACILLIN SODIUM, TAZOBACTAM SODIUM 3; .375 G/15ML; G/15ML
3.38 INJECTION, POWDER, LYOPHILIZED, FOR SOLUTION INTRAVENOUS ONCE
Status: COMPLETED | OUTPATIENT
Start: 2025-01-09 | End: 2025-01-09

## 2025-01-08 RX ORDER — CALCIUM CARBONATE 500 MG/1
1000 TABLET, CHEWABLE ORAL 4 TIMES DAILY PRN
Status: DISCONTINUED | OUTPATIENT
Start: 2025-01-08 | End: 2025-01-09 | Stop reason: HOSPADM

## 2025-01-08 RX ORDER — ACETAMINOPHEN 500 MG
500 TABLET ORAL EVERY 6 HOURS PRN
COMMUNITY

## 2025-01-08 RX ORDER — PIPERACILLIN SODIUM, TAZOBACTAM SODIUM 3; .375 G/15ML; G/15ML
3.38 INJECTION, POWDER, LYOPHILIZED, FOR SOLUTION INTRAVENOUS EVERY 8 HOURS
Status: DISCONTINUED | OUTPATIENT
Start: 2025-01-09 | End: 2025-01-09

## 2025-01-08 RX ADMIN — SODIUM CHLORIDE 1000 ML: 9 INJECTION, SOLUTION INTRAVENOUS at 23:58

## 2025-01-08 RX ADMIN — ACETAMINOPHEN 975 MG: 325 TABLET ORAL at 21:36

## 2025-01-08 RX ADMIN — SODIUM CHLORIDE 1000 ML: 9 INJECTION, SOLUTION INTRAVENOUS at 21:39

## 2025-01-08 RX ADMIN — CEFTRIAXONE SODIUM 1 G: 1 INJECTION, POWDER, FOR SOLUTION INTRAMUSCULAR; INTRAVENOUS at 21:44

## 2025-01-08 RX ADMIN — KETOROLAC TROMETHAMINE 15 MG: 15 INJECTION, SOLUTION INTRAMUSCULAR; INTRAVENOUS at 21:37

## 2025-01-08 ASSESSMENT — COLUMBIA-SUICIDE SEVERITY RATING SCALE - C-SSRS
6. HAVE YOU EVER DONE ANYTHING, STARTED TO DO ANYTHING, OR PREPARED TO DO ANYTHING TO END YOUR LIFE?: NO
2. HAVE YOU ACTUALLY HAD ANY THOUGHTS OF KILLING YOURSELF IN THE PAST MONTH?: NO
1. IN THE PAST MONTH, HAVE YOU WISHED YOU WERE DEAD OR WISHED YOU COULD GO TO SLEEP AND NOT WAKE UP?: NO

## 2025-01-08 ASSESSMENT — ACTIVITIES OF DAILY LIVING (ADL): ADLS_ACUITY_SCORE: 41

## 2025-01-09 ENCOUNTER — TELEPHONE (OUTPATIENT)
Dept: PHARMACY | Facility: HOSPITAL | Age: 40
End: 2025-01-09
Payer: COMMERCIAL

## 2025-01-09 VITALS
SYSTOLIC BLOOD PRESSURE: 125 MMHG | HEART RATE: 83 BPM | BODY MASS INDEX: 12.61 KG/M2 | RESPIRATION RATE: 18 BRPM | TEMPERATURE: 98.6 F | DIASTOLIC BLOOD PRESSURE: 80 MMHG | HEIGHT: 68 IN | OXYGEN SATURATION: 97 % | WEIGHT: 83.2 LBS

## 2025-01-09 PROBLEM — E11.65 TYPE 2 DIABETES MELLITUS WITH HYPERGLYCEMIA, WITHOUT LONG-TERM CURRENT USE OF INSULIN (H): Status: ACTIVE | Noted: 2021-11-30

## 2025-01-09 LAB
ALBUMIN SERPL BCG-MCNC: 3.5 G/DL (ref 3.5–5.2)
ALP SERPL-CCNC: 85 U/L (ref 40–150)
ALT SERPL W P-5'-P-CCNC: 15 U/L (ref 0–70)
ANION GAP SERPL CALCULATED.3IONS-SCNC: 9 MMOL/L (ref 7–15)
AST SERPL W P-5'-P-CCNC: 10 U/L (ref 0–45)
BACTERIA BLD CULT: NORMAL
BACTERIA BLD CULT: NORMAL
BACTERIA UR CULT: NORMAL
BASOPHILS # BLD AUTO: 0 10E3/UL (ref 0–0.2)
BASOPHILS NFR BLD AUTO: 0 %
BILIRUB SERPL-MCNC: 1.3 MG/DL
BUN SERPL-MCNC: 10.2 MG/DL (ref 6–20)
CALCIUM SERPL-MCNC: 8.8 MG/DL (ref 8.8–10.4)
CHLORIDE SERPL-SCNC: 104 MMOL/L (ref 98–107)
CREAT SERPL-MCNC: 0.78 MG/DL (ref 0.67–1.17)
EGFRCR SERPLBLD CKD-EPI 2021: >90 ML/MIN/1.73M2
EOSINOPHIL # BLD AUTO: 0.1 10E3/UL (ref 0–0.7)
EOSINOPHIL NFR BLD AUTO: 1 %
ERYTHROCYTE [DISTWIDTH] IN BLOOD BY AUTOMATED COUNT: 11.4 % (ref 10–15)
EST. AVERAGE GLUCOSE BLD GHB EST-MCNC: 235 MG/DL
GLUCOSE BLDC GLUCOMTR-MCNC: 190 MG/DL (ref 70–99)
GLUCOSE BLDC GLUCOMTR-MCNC: 240 MG/DL (ref 70–99)
GLUCOSE BLDC GLUCOMTR-MCNC: 241 MG/DL (ref 70–99)
GLUCOSE BLDC GLUCOMTR-MCNC: 262 MG/DL (ref 70–99)
GLUCOSE BLDC GLUCOMTR-MCNC: 268 MG/DL (ref 70–99)
GLUCOSE SERPL-MCNC: 285 MG/DL (ref 70–99)
HBA1C MFR BLD: 9.8 %
HCO3 SERPL-SCNC: 24 MMOL/L (ref 22–29)
HCT VFR BLD AUTO: 39 % (ref 40–53)
HGB BLD-MCNC: 13.5 G/DL (ref 13.3–17.7)
IMM GRANULOCYTES # BLD: 0.1 10E3/UL
IMM GRANULOCYTES NFR BLD: 1 %
LACTATE SERPL-SCNC: 1.1 MMOL/L (ref 0.7–2)
LYMPHOCYTES # BLD AUTO: 1.9 10E3/UL (ref 0.8–5.3)
LYMPHOCYTES NFR BLD AUTO: 14 %
MCH RBC QN AUTO: 30.6 PG (ref 26.5–33)
MCHC RBC AUTO-ENTMCNC: 34.6 G/DL (ref 31.5–36.5)
MCV RBC AUTO: 88 FL (ref 78–100)
MONOCYTES # BLD AUTO: 1 10E3/UL (ref 0–1.3)
MONOCYTES NFR BLD AUTO: 8 %
NEUTROPHILS # BLD AUTO: 9.9 10E3/UL (ref 1.6–8.3)
NEUTROPHILS NFR BLD AUTO: 76 %
NRBC # BLD AUTO: 0 10E3/UL
NRBC BLD AUTO-RTO: 0 /100
PLATELET # BLD AUTO: 232 10E3/UL (ref 150–450)
POTASSIUM SERPL-SCNC: 4.2 MMOL/L (ref 3.4–5.3)
PROT SERPL-MCNC: 6.5 G/DL (ref 6.4–8.3)
RBC # BLD AUTO: 4.41 10E6/UL (ref 4.4–5.9)
SODIUM SERPL-SCNC: 137 MMOL/L (ref 135–145)
WBC # BLD AUTO: 13 10E3/UL (ref 4–11)

## 2025-01-09 PROCEDURE — 96361 HYDRATE IV INFUSION ADD-ON: CPT

## 2025-01-09 PROCEDURE — 99207 PR NO BILLABLE SERVICE THIS VISIT: CPT | Performed by: HOSPITALIST

## 2025-01-09 PROCEDURE — G0378 HOSPITAL OBSERVATION PER HR: HCPCS

## 2025-01-09 PROCEDURE — 83605 ASSAY OF LACTIC ACID: CPT | Performed by: EMERGENCY MEDICINE

## 2025-01-09 PROCEDURE — 99239 HOSP IP/OBS DSCHRG MGMT >30: CPT | Performed by: HOSPITALIST

## 2025-01-09 PROCEDURE — 250N000011 HC RX IP 250 OP 636: Performed by: INTERNAL MEDICINE

## 2025-01-09 PROCEDURE — 80053 COMPREHEN METABOLIC PANEL: CPT | Performed by: INTERNAL MEDICINE

## 2025-01-09 PROCEDURE — 96366 THER/PROPH/DIAG IV INF ADDON: CPT

## 2025-01-09 PROCEDURE — 36415 COLL VENOUS BLD VENIPUNCTURE: CPT | Performed by: EMERGENCY MEDICINE

## 2025-01-09 PROCEDURE — 101N000002 HC CUSTODIAL CARE DAILY

## 2025-01-09 PROCEDURE — 96376 TX/PRO/DX INJ SAME DRUG ADON: CPT

## 2025-01-09 PROCEDURE — 258N000003 HC RX IP 258 OP 636: Performed by: INTERNAL MEDICINE

## 2025-01-09 PROCEDURE — 250N000012 HC RX MED GY IP 250 OP 636 PS 637: Performed by: INTERNAL MEDICINE

## 2025-01-09 PROCEDURE — 82040 ASSAY OF SERUM ALBUMIN: CPT | Performed by: INTERNAL MEDICINE

## 2025-01-09 PROCEDURE — 120N000001 HC R&B MED SURG/OB

## 2025-01-09 PROCEDURE — 250N000012 HC RX MED GY IP 250 OP 636 PS 637: Performed by: HOSPITALIST

## 2025-01-09 PROCEDURE — 250N000011 HC RX IP 250 OP 636: Performed by: HOSPITALIST

## 2025-01-09 PROCEDURE — 85025 COMPLETE CBC W/AUTO DIFF WBC: CPT | Performed by: INTERNAL MEDICINE

## 2025-01-09 PROCEDURE — 36415 COLL VENOUS BLD VENIPUNCTURE: CPT | Performed by: INTERNAL MEDICINE

## 2025-01-09 PROCEDURE — 96375 TX/PRO/DX INJ NEW DRUG ADDON: CPT

## 2025-01-09 RX ORDER — CIPROFLOXACIN 500 MG/1
500 TABLET, FILM COATED ORAL 2 TIMES DAILY
Qty: 10 TABLET | Refills: 0 | Status: SHIPPED | OUTPATIENT
Start: 2025-01-10 | End: 2025-01-15

## 2025-01-09 RX ORDER — NALOXONE HYDROCHLORIDE 0.4 MG/ML
0.4 INJECTION, SOLUTION INTRAMUSCULAR; INTRAVENOUS; SUBCUTANEOUS
Status: DISCONTINUED | OUTPATIENT
Start: 2025-01-09 | End: 2025-01-09 | Stop reason: HOSPADM

## 2025-01-09 RX ORDER — ACETAMINOPHEN 500 MG
500 TABLET ORAL EVERY 6 HOURS PRN
Status: DISCONTINUED | OUTPATIENT
Start: 2025-01-09 | End: 2025-01-09 | Stop reason: HOSPADM

## 2025-01-09 RX ORDER — NALOXONE HYDROCHLORIDE 0.4 MG/ML
0.2 INJECTION, SOLUTION INTRAMUSCULAR; INTRAVENOUS; SUBCUTANEOUS
Status: DISCONTINUED | OUTPATIENT
Start: 2025-01-09 | End: 2025-01-09 | Stop reason: HOSPADM

## 2025-01-09 RX ORDER — NICOTINE POLACRILEX 4 MG
15-30 LOZENGE BUCCAL
Status: DISCONTINUED | OUTPATIENT
Start: 2025-01-09 | End: 2025-01-09 | Stop reason: HOSPADM

## 2025-01-09 RX ORDER — CEFPODOXIME PROXETIL 200 MG/1
200 TABLET, FILM COATED ORAL 2 TIMES DAILY
Qty: 10 TABLET | Refills: 0 | Status: SHIPPED | OUTPATIENT
Start: 2025-01-09 | End: 2025-01-09

## 2025-01-09 RX ORDER — CEFTRIAXONE 1 G/1
1 INJECTION, POWDER, FOR SOLUTION INTRAMUSCULAR; INTRAVENOUS ONCE
Status: COMPLETED | OUTPATIENT
Start: 2025-01-09 | End: 2025-01-09

## 2025-01-09 RX ORDER — CIPROFLOXACIN 500 MG/1
500 TABLET, FILM COATED ORAL 2 TIMES DAILY
Qty: 10 TABLET | Refills: 0 | Status: SHIPPED | OUTPATIENT
Start: 2025-01-10 | End: 2025-01-09

## 2025-01-09 RX ORDER — DEXTROSE MONOHYDRATE 25 G/50ML
25-50 INJECTION, SOLUTION INTRAVENOUS
Status: DISCONTINUED | OUTPATIENT
Start: 2025-01-09 | End: 2025-01-09 | Stop reason: HOSPADM

## 2025-01-09 RX ORDER — DEXTROSE MONOHYDRATE 25 G/50ML
25-50 INJECTION, SOLUTION INTRAVENOUS
Status: DISCONTINUED | OUTPATIENT
Start: 2025-01-09 | End: 2025-01-09

## 2025-01-09 RX ORDER — NICOTINE POLACRILEX 4 MG
15-30 LOZENGE BUCCAL
Status: DISCONTINUED | OUTPATIENT
Start: 2025-01-09 | End: 2025-01-09

## 2025-01-09 RX ORDER — CEFTRIAXONE 1 G/1
1 INJECTION, POWDER, FOR SOLUTION INTRAMUSCULAR; INTRAVENOUS EVERY 24 HOURS
Status: DISCONTINUED | OUTPATIENT
Start: 2025-01-09 | End: 2025-01-09

## 2025-01-09 RX ADMIN — SODIUM CHLORIDE: 9 INJECTION, SOLUTION INTRAVENOUS at 00:59

## 2025-01-09 RX ADMIN — PIPERACILLIN AND TAZOBACTAM 3.38 G: 3; .375 INJECTION, POWDER, FOR SOLUTION INTRAVENOUS at 00:58

## 2025-01-09 RX ADMIN — PIPERACILLIN AND TAZOBACTAM 3.38 G: 3; .375 INJECTION, POWDER, FOR SOLUTION INTRAVENOUS at 07:06

## 2025-01-09 RX ADMIN — INSULIN GLARGINE 5 UNITS: 100 INJECTION, SOLUTION SUBCUTANEOUS at 09:33

## 2025-01-09 RX ADMIN — CEFTRIAXONE SODIUM 1 G: 1 INJECTION, POWDER, FOR SOLUTION INTRAMUSCULAR; INTRAVENOUS at 16:33

## 2025-01-09 RX ADMIN — INSULIN ASPART 1 UNITS: 100 INJECTION, SOLUTION INTRAVENOUS; SUBCUTANEOUS at 05:00

## 2025-01-09 ASSESSMENT — ACTIVITIES OF DAILY LIVING (ADL)
ADLS_ACUITY_SCORE: 45
ADLS_ACUITY_SCORE: 41
ADLS_ACUITY_SCORE: 45
ADLS_ACUITY_SCORE: 41
ADLS_ACUITY_SCORE: 45
ADLS_ACUITY_SCORE: 41
ADLS_ACUITY_SCORE: 45

## 2025-01-09 NOTE — PLAN OF CARE
"  Problem: Adult Inpatient Plan of Care  Goal: Plan of Care Review  Description: The Plan of Care Review/Shift note should be completed every shift.  The Outcome Evaluation is a brief statement about your assessment that the patient is improving, declining, or no change.  This information will be displayed automatically on your shift  note.  Outcome: Progressing  Goal: Patient-Specific Goal (Individualized)  Description: You can add care plan individualizations to a care plan. Examples of Individualization might be:  \"Parent requests to be called daily at 9am for status\", \"I have a hard time hearing out of my right ear\", or \"Do not touch me to wake me up as it startles  me\".  Outcome: Progressing  Goal: Absence of Hospital-Acquired Illness or Injury  Outcome: Progressing  Intervention: Identify and Manage Fall Risk  Recent Flowsheet Documentation  Taken 1/9/2025 0800 by Luna Patel RN  Safety Promotion/Fall Prevention:   treat underlying cause   treat reversible contributory factors   patient and family education   nonskid shoes/slippers when out of bed   mobility aid in reach   lighting adjusted   increase visualization of patient   increased rounding and observation   clutter free environment maintained  Intervention: Prevent Skin Injury  Recent Flowsheet Documentation  Taken 1/9/2025 0800 by Luna Patel RN  Body Position: position changed independently  Goal: Optimal Comfort and Wellbeing  Outcome: Progressing  Goal: Readiness for Transition of Care  Outcome: Progressing   Goal Outcome Evaluation:             Pt is alert and oriented x4. Pt is med complaint. Pt denies pain. Pt's v/s is stable. No complain. Continue to monitor pt.           "

## 2025-01-09 NOTE — TELEPHONE ENCOUNTER
PA Initiation    Medication: CEFPODOXIME PROXETIL 200 MG PO TABS  Insurance Company: [employer] - Phone [phone] Fax [phone]  Pharmacy Filling the Rx: [place], [place] - [address] [place] [address]  Filling Pharmacy Phone: [phone]  Filling Pharmacy Fax: [phone]  Start Date: 1/9/2025

## 2025-01-09 NOTE — ED TRIAGE NOTES
Hx of kidney stones. Dysuria and flank pain for 5 days.   Subjective fever and chills.     Triage Assessment (Adult)       Row Name 01/08/25 2042          Triage Assessment    Airway WDL WDL        Respiratory WDL    Respiratory WDL WDL        Skin Circulation/Temperature WDL    Skin Circulation/Temperature WDL WDL        Cardiac WDL    Cardiac WDL WDL        Peripheral/Neurovascular WDL    Peripheral Neurovascular WDL WDL        Cognitive/Neuro/Behavioral WDL    Cognitive/Neuro/Behavioral WDL WDL

## 2025-01-09 NOTE — ED PROVIDER NOTES
EMERGENCY DEPARTMENT ENCOUNTER      NAME: Deonna Honeycutt  AGE: 39 year old male  YOB: 1985  MRN: [id number]  EVALUATION DATE & TIME: No admission date for patient encounter.    PCP: No Ref-Primary, Physician    ED PROVIDER: Aleksandra Coe MD    Chief Complaint   Patient presents with    Dysuria    Fever         FINAL IMPRESSION:  1. Severe sepsis (H)    2. Lactic acidosis    3. Leukocytosis, unspecified type    4. Fever, unspecified fever cause    5. Sinus tachycardia    6. Pyelonephritis          ED COURSE & MEDICAL DECISION MAKING:    Pertinent Labs & Imaging studies reviewed. (See chart for details)  39 year old male with history of DM, HLD and previous ureterolithiasis who presents to the Emergency Department for evaluation of 5 days of left-sided flank pain, dysuria.  On examination patient is febrile, tachycardic with left lower quadrant left-sided CVA tenderness.  Highly suspicious for pyelonephritis.  Differential includes ureterolithiasis and infected stone, diverticulitis.    Patient initially seen evaluate myself in triage area due to boarding crisis.  IV established and blood obtained.  Given Tylenol for fever, Toradol for pain, 1 L bolus.  CBC, CMP, CRP, lactate, blood cultures, urinalysis obtained and notable for WBC of 17.2, CRP of 65, lactate of 2.5, blood glucose of 341 and a known diabetic and evidence of leukocyte esterase and white cells in the urine.  No previous cultures on file.  Was covered with Rocephin.  CT of the abdomen pelvis ordered to evaluate for concurrent stone.  Results of this are pending at time this dictation.  Patient signed out to oncoming physician awaiting ultimate admission for management of urosepsis.      ED Course as of 01/08/25 2213 Wed Jan 08, 2025 2109 Pulse(!): 129   2132 WBC(!): 17.2   2132 Glucose Urine(!): >1000  Known DM   2132 Leukocyte Esterase Urine(!): 75 Sven/uL   2133 Lactic Acid(!): 2.5   2149 Glucose(!): 341  Known DM   2149 CRP  Inflammation(!): 65.40       Medical Decision Making  Obtained supplemental history:Supplemental history obtained?: No  Reviewed external records: External records reviewed?:  10/25/2023 clinic note  Care impacted by chronic illness:Diabetes and Hyperlipidemia  Did you consider but not order tests?: Work up considered but not performed and documented in chart, if applicable  Did you interpret images independently?: Independent interpretation of ECG and images noted in documentation, when applicable.  Consultation discussion with other provider:Did you involve another provider (consultant, , pharmacy, etc.)?: I discussed the care with another health care provider, see documentation for details.  Admit.    MIPS: Not Applicable      At the conclusion of the encounter I discussed the results of all of the tests and the disposition. The questions were answered. The patient or family acknowledged understanding and was agreeable with the care plan.    The patient has signs of sepsis   Sepsis ED evaluation   The patient has signs of sepsis as evidenced by:  1. Presence of 2 SIRS criteria, suspected infection, AND  2. Organ dysfunction: Lactic Acidosis with value >2.0 due to sepsis    Time zero:  2122  on 01/08/25 as this was the time when Lactate was resulted and the level was greater than 2.    Lactic Acid Results:  Recent Labs   Lab Test 01/08/25 2122   LACT 2.5*       3 Hour Bundle 6 Hour Bundle (Reassessment)   Blood Cultures before IV Antibiotics: Yes  Antibiotics given: see below  Prehospital fluid volume (mL):                     Total fluids given (ED +Pre-hospital):  The patient responded to a lesser volume of IV fluids. The initial volume ordered was 2000 mL.    Repeat Lactic Acid Level: Ordered by reflex for 2 hours after initial lactic acid collection.  Vasopressors: MAP>65 after initial IVF bolus, will continue to monitor fluid status and vital signs.  Repeat perfusion exam: I attest to having performed a  repeat sepsis exam and assessment of perfusion at  2216 .   BMI Readings from Last 1 Encounters:   01/08/25 12.65 kg/m        Anti-infectives (From admission through now)      Start     Dose/Rate Route Frequency Ordered Stop    01/08/25 2200  cefTRIAXone (ROCEPHIN) 1 g vial to attach to  mL bag for ADULTS or NS 50 mL bag for PEDS         1 g  over 15-30 Minutes Intravenous ONCE 01/08/25 2133 01/08/25 2159                  CRITICAL CARE:  Critical Care  Performed by: Aleksandra Coe MD  Authorized by: Aleksandra Coe MD     Total critical care time: 35 minutes  Criticalcare time was exclusive of separately billable procedures and treating other patients.  Critical care was necessary to treat or prevent imminent or life-threatening deterioration of the following conditions: sepsis  Critical care was time spent personally by me on the following activities: development of treatment plan with patient or surrogate, discussions with consultants, examination of patient, evaluation of patient's response totreatment, obtaining history from patient or surrogate, ordering and performing treatments and interventions, ordering and review of laboratory studies, ordering and review of radiographic studies and re-evaluation ofpatient's condition, this excludes any separately billable procedures.      MEDICATIONS GIVEN IN THE EMERGENCY:  Medications   acetaminophen (TYLENOL) tablet 975 mg (975 mg Oral $Given 1/8/25 2136)   ketorolac (TORADOL) injection 15 mg (15 mg Intravenous $Given 1/8/25 2137)   sodium chloride 0.9% BOLUS 1,000 mL (1,000 mLs Intravenous $Started 1/8/25 2139)   cefTRIAXone (ROCEPHIN) 1 g vial to attach to  mL bag for ADULTS or NS 50 mL bag for PEDS (1 g Intravenous $Started 1/8/25 2144)       NEW PRESCRIPTIONS STARTED AT TODAY'S ER VISIT  New Prescriptions    No medications on file          =================================================================    HPI    Patient information was obtained from:  Patient    Use of Intrepreter: Yes (Phone) - Language: Bell Honeycutt is a 39 year old male with pertinent medical history of hyperoxaluria, urinary tract stones, ureterolithiasis, dyslipidemia, type 2 diabetes who presents to this emergency department by walk-in for evaluation of dysuria and fever.     Patient reports left lower quadrant abdominal pain with an achy characteristic during urination and increased urinary frequency. He mentions that he has intermittent pain in his left heel with this as well. During my exam of the patient, his oral temperature was measured at 102.7 F, heart rate at 119-120.    Patient denies hematuria or any other symptoms at this time.       PAST MEDICAL HISTORY:  Past Medical History:   Diagnosis Date    Type 2 diabetes mellitus (H)     Ureterolithiasis 07/12/2018       PAST SURGICAL HISTORY:  Past Surgical History:   Procedure Laterality Date    HC CYSTOSCOPY,INSERT URETERAL STENT Left 09/25/2018    Procedure: CYSTOSCOPY LEFT URETEROSCOPY STENT INSERTION, INCISION OF URETER;  Surgeon: Corky Hercules MD;  Location: [facility];  Service: Urology    KIDNEY STONE SURGERY Left     2018    OTHER SURGICAL HISTORY      none    SURGICAL PATHOLOGY EXAM Left     Lipoma Removal       CURRENT MEDICATIONS:    Cannot display prior to admission medications because the patient has not been admitted in this contact.       ALLERGIES:  No Known Allergies    FAMILY HISTORY:  Family History   Problem Relation Age of Onset    Diabetes Father     Hypertension Father     Urolithiasis Father     Nephrolithiasis Mother     Urolithiasis Mother     Diabetes Sister     Cancer No family hx of        SOCIAL HISTORY:  Social History     Tobacco Use    Smoking status: Never    Smokeless tobacco: Never   Vaping Use    Vaping status: Never Used   Substance Use Topics    Alcohol use: Yes     Comment: Alcoholic Drinks/day: social    Drug use: Never        VITALS:  Patient Vitals for the past 24 hrs:    "BP Temp Temp src Pulse Resp SpO2 Height Weight   01/08/25 2118 -- (!) 102.7  F (39.3  C) Oral 120 -- -- -- --   01/08/25 2043 121/86 99.3  F (37.4  C) -- (!) 129 16 98 % -- --   01/08/25 2041 -- -- -- -- -- -- 1.727 m (5' 8\") 37.7 kg (83 lb 3.2 oz)       PHYSICAL EXAM    General Appearance: Well-appearing, well-nourished, no acute distress warm to touch  Head:  Normocephalic  Cardio:  Tachycardia, normal rhythm  Pulm:  No respiratory distress, clear to auscultation bilaterally  Back: Left-sided CVA tenderness, normal ROM  Abdomen:  Soft, left lower quadrant pain, non distended,no rebound or guarding.  Extremities: Normal gait  Skin:  Skin warm to the touch, dry, no rashes  Neuro:  Alert and oriented ×3     RADIOLOGY/LABS:  Reviewed all pertinent imaging. Please see official radiology report. All pertinent labs reviewed and interpreted.    Results for orders placed or performed during the hospital encounter of 01/08/25   UA with Microscopic reflex to Culture    Specimen: Urine, Midstream   Result Value Ref Range    Color Urine Light Yellow Colorless, Straw, Light Yellow, Yellow    Appearance Urine Clear Clear    Glucose Urine >1000 (A) Negative mg/dL    Bilirubin Urine Negative Negative    Ketones Urine Negative Negative mg/dL    Specific Gravity Urine 1.028 1.001 - 1.030    Blood Urine Negative Negative    pH Urine 7.5 (H) 5.0 - 7.0    Protein Albumin Urine Negative Negative mg/dL    Urobilinogen Urine <2.0 <2.0 mg/dL    Nitrite Urine Negative Negative    Leukocyte Esterase Urine 75 Sven/uL (A) Negative    RBC Urine 1 <=2 /HPF    WBC Urine 33 (H) <=5 /HPF   Basic metabolic panel   Result Value Ref Range    Sodium 133 (L) 135 - 145 mmol/L    Potassium 4.4 3.4 - 5.3 mmol/L    Chloride 94 (L) 98 - 107 mmol/L    Carbon Dioxide (CO2) 24 22 - 29 mmol/L    Anion Gap 15 7 - 15 mmol/L    Urea Nitrogen 10.0 6.0 - 20.0 mg/dL    Creatinine 0.79 0.67 - 1.17 mg/dL    GFR Estimate >90 >60 mL/min/1.73m2    Calcium 9.9 8.8 - 10.4 " mg/dL    Glucose 341 (H) 70 - 99 mg/dL   Result Value Ref Range    CRP Inflammation 65.40 (H) <5.00 mg/L   CBC with platelets and differential   Result Value Ref Range    WBC Count 17.2 (H) 4.0 - 11.0 10e3/uL    RBC Count 5.15 4.40 - 5.90 10e6/uL    Hemoglobin 15.7 13.3 - 17.7 g/dL    Hematocrit 45.2 40.0 - 53.0 %    MCV 88 78 - 100 fL    MCH 30.5 26.5 - 33.0 pg    MCHC 34.7 31.5 - 36.5 g/dL    RDW 11.3 10.0 - 15.0 %    Platelet Count 285 150 - 450 10e3/uL    % Neutrophils 83 %    % Lymphocytes 10 %    % Monocytes 5 %    % Eosinophils 0 %    % Basophils 0 %    % Immature Granulocytes 0 %    NRBCs per 100 WBC 0 <1 /100    Absolute Neutrophils 14.3 (H) 1.6 - 8.3 10e3/uL    Absolute Lymphocytes 1.8 0.8 - 5.3 10e3/uL    Absolute Monocytes 0.9 0.0 - 1.3 10e3/uL    Absolute Eosinophils 0.1 0.0 - 0.7 10e3/uL    Absolute Basophils 0.1 0.0 - 0.2 10e3/uL    Absolute Immature Granulocytes 0.1 <=0.4 10e3/uL    Absolute NRBCs 0.0 10e3/uL   Lactic Acid Whole Blood with 1X Repeat in 2 HR when >2   Result Value Ref Range    Lactic Acid, Initial 2.5 (H) 0.7 - 2.0 mmol/L   Glucose by meter   Result Value Ref Range    GLUCOSE BY METER POCT 306 (H) 70 - 99 mg/dL       The creation of this record is based on the scribe s observations of the work being performed by Aleksandra Coe MD and the provider s statements to them. It was created on her behalf by Nathan Rojo, a trained medical scribe. This document has been checked and approved by the attending provider.    Aleksandra Coe MD  Emergency Medicine  [facility] EMERGENCY DEPARTMENT  [facility] [facility] [id number] [phone]  Dept: [phone]      Aleksandra Coe MD  01/08/25 7223

## 2025-01-09 NOTE — PROGRESS NOTES
"[facility] Hospitalist Progress Note  [facility]  Admission date: 1/8/2025    Summary:    39F with ascending UTI with severe sepsis    Assessment/Plan    #Ascending UTI with severe sepsis  -sepsis physiology resolved.  No hx of pseudomonas.  Change back to ceftriaxone    #DM2 with hyperglycemia- last A1c 7.6 >2yo.  recheck  -home regimen: metformin.  Upon discharge may benefit from additional agent   -inpatient: lantus, sliding scale insulin.          Checklist:  Code Status: Full Code    Diet: Consistent Carbohydrate Diet Low Consistent Carb (45 g CHO per Meal) Diet     Cardiac Monitoring: None    Disposition and Discharge Planning    Barriers: sepsis     Number of days selected below is from a list of system pre-approved options.   Medically Ready for Discharge: Anticipated Tomorrow, potentially later today if no recurrent fever, symptoms improving, and able to have follow-up.        System Identified Risk Variables  Auto-populated based on system request.  If more complex management decisions required, to be addressed above.  \"  Clinically Significant Risk Factors Present on Admission         # Hyponatremia: Lowest Na = 133 mmol/L in last 2 days, will monitor as appropriate  # Hypochloremia: Lowest Cl = 94 mmol/L in last 2 days, will monitor as appropriate                    # Cachexia: Estimated body mass index is 12.65 kg/m  as calculated from the following:    Height as of this encounter: 1.727 m (5' 8\").    Weight as of this encounter: 37.7 kg (83 lb 3.2 oz).              \"    Interval Events/Subjective/Notable results:    Declined interpretor services  Starting to feel better already.  No flank pain or imaging changes to suggest pyelo    Reviewed: UA, UC, CT, Bmp, CBC, A1c, lactate  Personally interpreted: NA      Objective    Vital signs in last 24 hours  Temp:  [98.1  F (36.7  C)-102.7  F (39.3  C)] 98.1  F (36.7  C)  Pulse:  [] 73  Resp:  [16-18] 18  BP: (114-121)/(65-86) 115/75  SpO2:  " [96 %-98 %] 97 %      Weight:   83 lbs 3.2 oz  Body mass index is 12.65 kg/m .    Intake/Output last 3 shifts  I/O last 3 completed shifts:  In: -   Out: 550 [Urine:550]    Physical Exam  General:  Alert, cooperative, no distress    Neurologic:  oriented, facial symmetry preserved, fluent speech.   Psych: calm  HEENT:  Anicteric, MMM  CV: RRR no MRG, normal S1 and S2, no edema  Lungs: CTAB.  Easy respirations  Abd: soft, NT, mild LLQ TTP, no flank TTP  Skin: no rashes noted on exposed skin.    Doe Catheter: Not present  Lines: None          Medical Decision Making               Hosea Gonzalez MD, [facility]  Internal Medicine Hospitalist

## 2025-01-09 NOTE — MEDICATION SCRIBE - ADMISSION MEDICATION HISTORY
Medication Scribe Admission Medication History    Admission medication history is complete. The information provided in this note is only as accurate as the sources available at the time of the update.    Information Source(s): Patient via in-person    Pertinent Information: Patient states that he takes Metformin and Jardiance and Tylenol. Patient reports that he have been out of Jardiance since July and took the last dose of Metformin today morning. Per patient, the reason he is out of any of his medication is that his insurance stopped some time in summer of 2024.  Per Rx fill history Metformin and Jardiance had last fill date of 2/27/2024 for 30 days supply with 2 refill remaining for Jardiance and 5 refill remaining for Metformin.    Changes made to PTA medication list:  Added: None  Deleted: Atorvastatin, Naproxen (per patient)  Changed: None    Allergies reviewed with patient and updates made in EHR: yes    Medication History Completed By: Vern Leong 1/8/2025 10:49 PM    PTA Med List   Medication Sig Last Dose/Taking    acetaminophen (TYLENOL) 500 MG tablet Take 500 mg by mouth every 6 hours as needed for mild pain. Taking As Needed    metFORMIN (GLUCOPHAGE XR) 500 MG 24 hr tablet Take 2 tablets (1,000 mg) by mouth 2 times daily (with meals) Then take 2 tablets in the AM and 2 in the PM after that. 1/8/2025 Morning

## 2025-01-09 NOTE — ED NOTES
"[facility] ED Handoff Report    ED Chief Complaint: Dysuria, fever    ED Diagnosis:  (A41.9,  R65.20) Severe sepsis (H)  Comment: Repeat lactic 1.1  Plan: Fluids, Abx    (E87.20) Lactic acidosis  Comment: Repeat Lactic 1.1  Plan: Abx, fluids    (D72.829) Leukocytosis, unspecified type  Comment: NA  Plan: monitor    (R50.9) Fever, unspecified fever cause  Comment: Afebrile at this time 98.2  Plan: monitor, treat sx    (R00.0) Sinus tachycardia  Comment: normal rhythm at this time  Plan: monitor    (N12) Pyelonephritis  Comment: NA  Plan: CT scan       PMH:    Past Medical History:   Diagnosis Date    Type 2 diabetes mellitus (H)     Ureterolithiasis 07/12/2018        Code Status:  Full Code     Falls Risk: No Band: Not applicable    Current Living Situation/Residence: lives with a significant other     Elimination Status: Continent: Yes     Activity Level: Independent    Patients Preferred Language:  Other: Bell     Needed: Yes Where is the patient located?    Vital Signs:  /65   Pulse 92   Temp 98.2  F (36.8  C) (Oral)   Resp 16   Ht 1.727 m (5' 8\")   Wt 37.7 kg (83 lb 3.2 oz)   SpO2 96%   BMI 12.65 kg/m       Cardiac Rhythm: NSR    Pain Score: 0/10    Is the Patient Confused:  No    Last Food or Drink: 1/8/25 at unknown    Focused Assessment:  Fever    Tests Performed: Done: Labs and Imaging    Treatments Provided:  Fluids, Abx    Family Dynamics/Concerns: No    Family Updated On Visitor Policy: No    Plan of Care Communicated to Family: No    Who Was Updated about Plan of Care: Pt    Belongings Checklist Done and Signed by Patient: No    Belongings Sent with Patient: personal effects    Medications sent with patient: None    Covid: asymptomatic , not done    Additional Information: Pt repeat lactic 1.1, denies pain at this time    RN: Marlon Marrero RN 1/9/2025 12:17 AM        "

## 2025-01-09 NOTE — PLAN OF CARE
"Goal Outcome Evaluation:    Pt is A & O x 4. Denies pain   Denies SOB/ chest pain   Respirations even and unlabored   No distress noted on assessments  Right arm IV infusing 125 ml/ hr continuously  Used urinal during shift   BG last checked 241  Insulin scale given   Pt slept most most of the shift   Call light within reach  Plan of care ongoing     /75   Pulse 82   Temp 98.2  F (36.8  C) (Oral)   Resp 16   Ht 1.727 m (5' 8\")   Wt 37.7 kg (83 lb 3.2 oz)   SpO2 97%   BMI 12.65 kg/m      Brigida Snell RN on 1/9/2025 at 4:52 AM      Problem: Adult Inpatient Plan of Care  Goal: Absence of Hospital-Acquired Illness or Injury  Intervention: Identify and Manage Fall Risk  Recent Flowsheet Documentation  Taken 1/9/2025 0200 by Brigida Snell, RN  Safety Promotion/Fall Prevention:   treat underlying cause   treat reversible contributory factors   patient and family education   nonskid shoes/slippers when out of bed   mobility aid in reach   lighting adjusted   increase visualization of patient   increased rounding and observation   clutter free environment maintained  Intervention: Prevent Skin Injury  Recent Flowsheet Documentation  Taken 1/9/2025 0200 by Brigida Snell, RN  Body Position: position changed independently     "

## 2025-01-09 NOTE — DISCHARGE SUMMARY
[facility] MEDICINE  DISCHARGE SUMMARY     Primary Care Physician: No Ref-Primary, Physician  Admission Date: 1/8/2025   Discharge Provider: Hosea Gonzalez MD Discharge Date: 1/9/2025   Diet:   Active Diet and Nourishment Order   Procedures    Consistent Carbohydrate Diet Low Consistent Carb (45 g CHO per Meal) Diet    Diet       Code Status: Full Code   Activity: DCACTIVITY: Activity as tolerated        Condition at Discharge: Stable     REASON FOR PRESENTATION(See Admission Note for Details)     fever    PRINCIPAL & ACTIVE DISCHARGE DIAGNOSES     Ascending UTI  Sepsis  Uncontrolled DM2      PENDING LABS     Unresulted Labs Ordered in the Past 30 Days of this Admission       Date and Time Order Name Status Description    1/8/2025  9:24 PM Urine Culture Preliminary     1/8/2025  9:18 PM Blood Culture Peripheral Blood Preliminary     1/8/2025  9:18 PM Blood Culture Peripheral Blood Preliminary               PROCEDURES ( this hospitalization only)          RECOMMENDATIONS TO OUTPATIENT PROVIDER FOR F/U VISIT     Follow-up Appointments       Hospital to Primary Care - Establish PCP Referral      Please be aware that coverage of these services is subject to the terms and limitations of your health insurance plan.  Call member services at your health plan with any benefit or coverage questions.    Schedule Primary Care visit within: 7 Days                 DISPOSITION     Home    SUMMARY OF HOSPITAL COURSE:      39F with ascending UTI with severe sepsis. Rapidly improved with IVF and ceftriaxone.       Assessment/Plan     #Ascending UTI with severe sepsis  -sepsis physiology rapidly resolved.  No hx of pseudomonas.  Received ceftriaxone x 2 and discharge with cipro x 5 more days.       #DM2 with hyperglycemia - last A1c 7.6 >2yo.  Recheck 9s.    -home regimen: metformin.  Start januvia for now.  Longer term SGLT2-I would be helpful, but would wait until UTI resolved.      Discharge  Medications with Med changes:     Current Discharge Medication List        START taking these medications    Details   ciprofloxacin (CIPRO) 500 MG tablet Take 1 tablet (500 mg) by mouth 2 times daily for 5 days.  Qty: 10 tablet, Refills: 0    Associated Diagnoses: Pyelonephritis      sitagliptin (JANUVIA) 100 MG tablet Take 1 tablet (100 mg) by mouth daily.  Qty: 30 tablet, Refills: 0    Associated Diagnoses: Type 2 diabetes mellitus with hyperglycemia, without long-term current use of insulin (H)           CONTINUE these medications which have NOT CHANGED    Details   acetaminophen (TYLENOL) 500 MG tablet Take 500 mg by mouth every 6 hours as needed for mild pain.      metFORMIN (GLUCOPHAGE XR) 500 MG 24 hr tablet Take 2 tablets (1,000 mg) by mouth 2 times daily (with meals) Then take 2 tablets in the AM and 2 in the PM after that.  Qty: 120 tablet, Refills: 11    Associated Diagnoses: Type 2 diabetes mellitus with hyperglycemia, without long-term current use of insulin (H)                   Rationale for medication changes:              Consults     None    Immunizations given this encounter     Most Recent Immunizations   Administered Date(s) Administered    COVID-19 MONOVALENT 12+ (Pfizer) 06/05/2021    Pneumococcal 20 valent Conjugate (Prevnar 20) 11/22/2022    TDAP (Adacel,Boostrix) 11/22/2022           Anticoagulation Information            SIGNIFICANT IMAGING FINDINGS     Results for orders placed or performed during the hospital encounter of 01/08/25   Abd/pelvis CT no contrast - Stone Protocol    Impression    IMPRESSION:   1.  There is mild bladder wall thickening and associated edema which could relate to cystitis. No hydronephrosis or obstructing urinary tract stone. Please note that potential changes of pyelonephritis are not reliably identified via noncontrast   technique.           Discharge Orders        Hospital to Primary Care - Establish PCP Referral      Reason for your hospital stay    You  were admitted with a urinary tract infection     Activity    Your activity upon discharge: activity as tolerated     Diet    Follow this diet upon discharge: Current Diet:Orders Placed This Encounter      Consistent Carbohydrate Diet Low Consistent Carb (45 g CHO per Meal) Diet       Examination   Physical Exam   Temp:  [98.1  F (36.7  C)-102.7  F (39.3  C)] 98.7  F (37.1  C)  Pulse:  [] 89  Resp:  [16-20] 20  BP: (114-140)/(65-87) 140/87  SpO2:  [96 %-98 %] 97 %  Wt Readings from Last 1 Encounters:   01/08/25 37.7 kg (83 lb 3.2 oz)     Doing well.  I think ok to manage with oral after another dose of ceftriaxone.  Confirmed phone# in case we need to change abx      Please see EMR for more detailed significant labs, imaging, consultant notes etc.    I, Hosea Gonzalez MD, personally saw the patient today and spent greater than 30 minutes discharging this patient.    Hosea Gonzalez MD  [facility]    CC:No Ref-Primary, Physician

## 2025-01-09 NOTE — ED NOTES
EMERGENCY DEPARTMENT SIGN OUT NOTE        ED COURSE AND MEDICAL DECISION MAKING  Patient was signed out to me by Dr Aleksandra Coe at 10:29 PM    In brief, Deonna Honeycutt is a 39 year old male who initially presented for evaluation of dysuria and fever. Patient reports left lower quadrant abdominal pain with an achy characteristic during urination and increased urinary frequency.       At time of sign out, disposition was pending CT results and then admission for management of urosepsis.     11:14 PM reviewed CT abd/pelvis, which does not show obstructing stones. Will admit to hospitalist at this time for sepsis/pyelonephritis.    FINAL IMPRESSION    1. Severe sepsis (H)    2. Lactic acidosis    3. Leukocytosis, unspecified type    4. Fever, unspecified fever cause    5. Sinus tachycardia    6. Pyelonephritis        ED MEDS  Medications   sodium chloride 0.9% BOLUS 1,000 mL (has no administration in time range)   acetaminophen (TYLENOL) tablet 975 mg (975 mg Oral $Given 1/8/25 2136)   ketorolac (TORADOL) injection 15 mg (15 mg Intravenous $Given 1/8/25 2137)   sodium chloride 0.9% BOLUS 1,000 mL (1,000 mLs Intravenous $Started 1/8/25 2139)   cefTRIAXone (ROCEPHIN) 1 g vial to attach to  mL bag for ADULTS or NS 50 mL bag for PEDS (1 g Intravenous $Started 1/8/25 2144)       LAB  Labs Ordered and Resulted from Time of ED Arrival to Time of ED Departure   ROUTINE UA WITH MICROSCOPIC REFLEX TO CULTURE - Abnormal       Result Value    Color Urine Light Yellow      Appearance Urine Clear      Glucose Urine >1000 (*)     Bilirubin Urine Negative      Ketones Urine Negative      Specific Gravity Urine 1.028      Blood Urine Negative      pH Urine 7.5 (*)     Protein Albumin Urine Negative      Urobilinogen Urine <2.0      Nitrite Urine Negative      Leukocyte Esterase Urine 75 Sven/uL (*)     RBC Urine 1      WBC Urine 33 (*)    BASIC METABOLIC PANEL - Abnormal    Sodium 133 (*)     Potassium 4.4      Chloride 94 (*)      Carbon Dioxide (CO2) 24      Anion Gap 15      Urea Nitrogen 10.0      Creatinine 0.79      GFR Estimate >90      Calcium 9.9      Glucose 341 (*)    CRP INFLAMMATION - Abnormal    CRP Inflammation 65.40 (*)    CBC WITH PLATELETS AND DIFFERENTIAL - Abnormal    WBC Count 17.2 (*)     RBC Count 5.15      Hemoglobin 15.7      Hematocrit 45.2      MCV 88      MCH 30.5      MCHC 34.7      RDW 11.3      Platelet Count 285      % Neutrophils 83      % Lymphocytes 10      % Monocytes 5      % Eosinophils 0      % Basophils 0      % Immature Granulocytes 0      NRBCs per 100 WBC 0      Absolute Neutrophils 14.3 (*)     Absolute Lymphocytes 1.8      Absolute Monocytes 0.9      Absolute Eosinophils 0.1      Absolute Basophils 0.1      Absolute Immature Granulocytes 0.1      Absolute NRBCs 0.0     LACTIC ACID WHOLE BLOOD WITH 1X REPEAT IN 2 HR WHEN >2 - Abnormal    Lactic Acid, Initial 2.5 (*)    GLUCOSE BY METER - Abnormal    GLUCOSE BY METER POCT 306 (*)    GLUCOSE MONITOR NURSING POCT   LACTIC ACID WHOLE BLOOD   BLOOD CULTURE   BLOOD CULTURE   URINE CULTURE       EKG  None    RADIOLOGY    Abd/pelvis CT no contrast - Stone Protocol   Final Result   IMPRESSION:    1.  There is mild bladder wall thickening and associated edema which could relate to cystitis. No hydronephrosis or obstructing urinary tract stone. Please note that potential changes of pyelonephritis are not reliably identified via noncontrast    technique.             DISCHARGE MEDS  New Prescriptions    No medications on file       I, Corky Randhawa, am serving as a scribe to document services personally performed by Andrew Ramachandran MD based on my observation and the provider's statements to me. I, Andrew Ramachandran MD, attest that Corky Randhawa is acting in a scribe capacity, has observed my performance of the services and has documented them in accordance with my direction.     Andrew Ramachandran MD  [facility] EMERGENCY  DEPARTMENT  [address] [place] [id number]  [phone]         Andrew Ramachandran MD  01/08/25 3191

## 2025-01-09 NOTE — H&P
[facility]    History and Physical - Hospitalist Service       Date of Admission:  1/8/2025    Assessment & Plan      Lay D Yinka is a 39 year old male admitted with a medical history significant for type II DM, history of kidney stones, and other medical comorbidities who is admitted with sepsis due to acute pyelonephritis    Patient Active Problem List   Diagnosis    Type 2 diabetes mellitus with hyperglycemia, without long-term current use of insulin (H)    Mixed dyslipidemia    Sinus tachycardia    Lactic acidosis    Pyelonephritis    Severe sepsis (H)    Fever, unspecified fever cause    Leukocytosis, unspecified type        Severe Sepsis Due to Acute Pyelonephritis:  Risk Factors: Include diabetes and history of kidney stones.  Continue intravenous Zosyn initiated in the ER to treat acute pyelonephritis.  Follow up on blood and urine culture results to guide any necessary adjustments to antibiotic therapy.  Monitor for signs of clinical improvement and adjust antibiotics as needed.  Continue intravenous fluid therapy to maintain hydration and support renal function.  Monitor vital signs and urine output closely to assess fluid status and renal function.  Provide analgesics for pain management, ensuring renal-safe options are used due to kidney involvement      Lactic acidosis management with gentle hydration.  Continue antipyretics for fever control.    Diabetes Management:  Monitor blood glucose levels closely with Accu-Cheks and administer sliding scale insulin as needed to maintain blood sugar goals between 100-140 mg/dL.  Monitoring and Support:  Follow up on laboratory tests, including creatinine levels, which are currently within normal limits, and monitor for any changes.  Ensure there is no evidence of obstructive uropathy.  Arrange for outpatient follow-up for hyperlipidemia management.  This comprehensive plan aims to address the severe sepsis secondary to acute pyelonephritis,  "ensuring effective management of comorbid conditions and monitoring for potential complications.       Diet:  Regular  DVT Prophylaxis: Pneumatic Compression Devices  Doe Catheter: Not present  Lines: None     Cardiac Monitoring: None  Code Status:  Full code    Clinically Significant Risk Factors Present on Admission         # Hyponatremia: Lowest Na = 133 mmol/L in last 2 days, will monitor as appropriate  # Hypochloremia: Lowest Cl = 94 mmol/L in last 2 days, will monitor as appropriate                    # Cachexia: Estimated body mass index is 12.65 kg/m  as calculated from the following:    Height as of this encounter: 1.727 m (5' 8\").    Weight as of this encounter: 37.7 kg (83 lb 3.2 oz).              Disposition Plan     Medically Ready for Discharge: Anticipated in 2-4 Days           Ngozi Medley MD  Hospitalist Service  [employer]  Securely message with [employer] (more info)  Text page via [facility] Paging/Directory     ______________________________________________________________________    Chief Complaint   Flank pain, dysuria and fevers        History of Present Illness   Deonna Honeycutt is a 39 year old male admitted with a medical history significant for type II DM, history of kidney stones, and other medical comorbidities who is admitted with sepsis due to acute pyelonephritis    Deonna Honeycutt,  presented to the emergency department with five days of left-sided flank pain, dysuria, and subjective fever and chills. The patient sought evaluation due to worsening symptoms indicative of a possible urinary tract infection or kidney-related issue.    Upon examination, the patient was febrile with a temperature of 102.7 F, tachycardic with a heart rate of 119-120 bpm, and exhibited left-sided costovertebral angle tenderness. Laboratory tests revealed an elevated white blood cell count of 17.2, a CRP of 65, a lactate level of 2.5, and a blood glucose level of 341 mg/dL. Urinalysis showed " leukocyte esterase and white cells, suggesting a urinary tract infection. A CT scan of the abdomen and pelvis without contrast was performed, revealing mild bladder wall thickening and edema which could relate to cystitis, but no obstructing stones or hydronephrosis were identified.    ER Intervention:   The patient was treated with 1 gram of ceftriaxone (Rocephin) and received a 1-liter fluid bolus to address sepsis and dehydration. Tylenol and Toradol were administered for fever and pain management. Blood cultures and additional laboratory tests were obtained to guide further treatment.  Ceftriaxone has been switched to Zosyn    Reason for Admission: Deonna Honeycutt was admitted for the management of sepsis secondary to acute pyelonephritis. The patient's clinical presentation, laboratory findings, and suspicion of a urinary tract infection necessitate inpatient care to ensure appropriate antibiotic therapy and monitoring for potential complications.        Past Medical History    Past Medical History:   Diagnosis Date    Type 2 diabetes mellitus (H)     Ureterolithiasis 07/12/2018       Past Surgical History   Past Surgical History:   Procedure Laterality Date    HC CYSTOSCOPY,INSERT URETERAL STENT Left 09/25/2018    Procedure: CYSTOSCOPY LEFT URETEROSCOPY STENT INSERTION, INCISION OF URETER;  Surgeon: Corky Hercules MD;  Location: [facility];  Service: Urology    KIDNEY STONE SURGERY Left     2018    OTHER SURGICAL HISTORY      none    SURGICAL PATHOLOGY EXAM Left     Lipoma Removal       Prior to Admission Medications   Prior to Admission Medications   Prescriptions Last Dose Informant Patient Reported? Taking?   acetaminophen (TYLENOL) 500 MG tablet   Yes Yes   Sig: Take 500 mg by mouth every 6 hours as needed for mild pain.   metFORMIN (GLUCOPHAGE XR) 500 MG 24 hr tablet 1/8/2025 Morning  No Yes   Sig: Take 2 tablets (1,000 mg) by mouth 2 times daily (with meals) Then take 2 tablets in the AM and 2 in  the PM after that.      Facility-Administered Medications: None        Review of Systems    The 10 point Review of Systems is negative other than noted in the HPI or here.     Social History   I have reviewed this patient's social history and updated it with pertinent information if needed.  Social History     Tobacco Use    Smoking status: Never    Smokeless tobacco: Never   Vaping Use    Vaping status: Never Used   Substance Use Topics    Alcohol use: Yes     Comment: Alcoholic Drinks/day: social    Drug use: Never         Family History   I have reviewed this patient's family history and updated it with pertinent information if needed.  Family History   Problem Relation Age of Onset    Diabetes Father     Hypertension Father     Urolithiasis Father     Nephrolithiasis Mother     Urolithiasis Mother     Diabetes Sister     Cancer No family hx of          Allergies   No Known Allergies     Physical Exam   Vital Signs: Temp: (!) 102.7  F (39.3  C) Temp src: Oral BP: 121/86 Pulse: 120   Resp: 16 SpO2: 98 %      Weight: 83 lbs 3.2 oz      General Aox3, appropriate affect, NAD, on RA  HEENT  MMM, EOMI, PERRL  Chest Adeq E b/l, No wheezing  Heart RRR, No M/R/G  Abd- Soft, NT, BS+  - Deferred,   Extremity- Moving all extremities, No digital clubbing,   No edema  Neuro- Aox3, moving all extremities, no peripheral vision loss  P5/5, grossly nonfocal,  gait not checked  Skin  Has no tattoo, No skin rash     Medical Decision Making       85 MINUTES SPENT BY ME on the date of service doing chart review, history, exam, documentation & further activities per the note.      ------------------ MEDICAL DECISION MAKING ------------------------------------------------------------------------------------------------------  MANAGEMENT DISCUSSED with the following over the past 24 hours: Patient and care team       Data   ------------------------- PAST 24 HR DATA REVIEWED -----------------------------------------------    I have  personally reviewed the following data over the past 24 hrs:    17.2 (H)  \   15.7   / 285     133 (L) 94 (L) 10.0 /  306 (H)   4.4 24 0.79 \     Procal: N/A CRP: 65.40 (H) Lactic Acid: 2.5 (H)         Imaging results reviewed over the past 24 hrs:   Recent Results (from the past 24 hours)   Abd/pelvis CT no contrast - Stone Protocol    Narrative    EXAM: CT ABDOMEN PELVIS W/O CONTRAST  LOCATION: [facility]  DATE: 1/8/2025    INDICATION: Flank pain, fevers.  Rule out infected stone  COMPARISON: CT from 10/28/2019.  TECHNIQUE: CT scan of the abdomen and pelvis was performed without IV contrast. Multiplanar reformats were obtained. Dose reduction techniques were used.  CONTRAST: None.    FINDINGS:   LOWER CHEST: Normal.    HEPATOBILIARY: Normal.    PANCREAS: Normal.    SPLEEN: Normal.    ADRENAL GLANDS: Normal.    KIDNEYS/BLADDER: No hydronephrosis. No obstructing ureteral stones. The bladder wall is thickened and there is some haziness near the distal ureters and bladder.    BOWEL: Noninflamed appendix. Normal caliber small and large bowel.    LYMPH NODES: Normal.    VASCULATURE: Normal.    PELVIC ORGANS: Small fat-containing left inguinal hernia.    MUSCULOSKELETAL: Normal.      Impression    IMPRESSION:   1.  There is mild bladder wall thickening and associated edema which could relate to cystitis. No hydronephrosis or obstructing urinary tract stone. Please note that potential changes of pyelonephritis are not reliably identified via noncontrast   technique.

## 2025-01-10 NOTE — PLAN OF CARE
"  Problem: Adult Inpatient Plan of Care  Goal: Plan of Care Review  Description: The Plan of Care Review/Shift note should be completed every shift.  The Outcome Evaluation is a brief statement about your assessment that the patient is improving, declining, or no change.  This information will be displayed automatically on your shift  note.  1/9/2025 1821 by Luna Patel RN  Outcome: Progressing  1/9/2025 1119 by Luna Patel RN  Outcome: Progressing  Goal: Patient-Specific Goal (Individualized)  Description: You can add care plan individualizations to a care plan. Examples of Individualization might be:  \"Parent requests to be called daily at 9am for status\", \"I have a hard time hearing out of my right ear\", or \"Do not touch me to wake me up as it startles  me\".  1/9/2025 1821 by Luna Patel RN  Outcome: Progressing  1/9/2025 1119 by Luna Patel RN  Outcome: Progressing  Goal: Absence of Hospital-Acquired Illness or Injury  1/9/2025 1821 by Luna Patel RN  Outcome: Progressing  1/9/2025 1119 by Luna Patel RN  Outcome: Progressing  Intervention: Identify and Manage Fall Risk  Recent Flowsheet Documentation  Taken 1/9/2025 1542 by Luna Patel RN  Safety Promotion/Fall Prevention:   treat underlying cause   treat reversible contributory factors   patient and family education   nonskid shoes/slippers when out of bed   mobility aid in reach   lighting adjusted   increase visualization of patient   increased rounding and observation   clutter free environment maintained  Taken 1/9/2025 0800 by Luna Patel RN  Safety Promotion/Fall Prevention:   treat underlying cause   treat reversible contributory factors   patient and family education   nonskid shoes/slippers when out of bed   mobility aid in reach   lighting adjusted   increase visualization of patient   increased rounding and observation   clutter free environment maintained  Intervention: Prevent Skin Injury  Recent Flowsheet " Documentation  Taken 1/9/2025 1542 by Luna Patel RN  Body Position: position changed independently  Taken 1/9/2025 0800 by Luna Patel RN  Body Position: position changed independently  Goal: Optimal Comfort and Wellbeing  1/9/2025 1821 by Luna Patel RN  Outcome: Progressing  1/9/2025 1119 by Luna Patel RN  Outcome: Progressing  Goal: Readiness for Transition of Care  1/9/2025 1821 by Luna Patel RN  Outcome: Progressing  1/9/2025 1119 by Luna Patel RN  Outcome: Progressing   Goal Outcome Evaluation:               Pt received all of his discharge paperwork and belongings. Pt left the unit at 1823.

## 2025-01-11 ENCOUNTER — PATIENT OUTREACH (OUTPATIENT)
Dept: CARE COORDINATION | Facility: CLINIC | Age: 40
End: 2025-01-11
Payer: COMMERCIAL

## 2025-01-11 LAB — BACTERIA UR CULT: ABNORMAL

## 2025-01-11 NOTE — PROGRESS NOTES
[facility] Care Resource Center Contact  [facility] [[email or web address]]/Voicemail     Clinical Data: Post-Discharge Outreach     Outreach attempted x 2.  Unable to leave a  message on patient's voicemail, providing [facility]'s central phone number of [phone] ([phone]) for questions/concerns and/or to schedule an appt with an [facility] provider, if they do not have a PCP.      Plan:  [facility] will do no further outreaches at this time.        Symone TODD Community Health Worker  Clinic Care Coordination  [facility]  Phone: [phone]      *Connected Care Resource Team does NOT follow patient ongoing. Referrals are identified based on internal discharge reports and the outreach is to ensure patient has an understanding of their discharge instructions

## 2025-01-13 LAB
BACTERIA BLD CULT: NO GROWTH
BACTERIA BLD CULT: NO GROWTH

## 2025-01-14 ENCOUNTER — OFFICE VISIT (OUTPATIENT)
Dept: FAMILY MEDICINE | Facility: CLINIC | Age: 40
End: 2025-01-14
Attending: HOSPITALIST
Payer: COMMERCIAL

## 2025-01-14 VITALS
TEMPERATURE: 97.7 F | DIASTOLIC BLOOD PRESSURE: 86 MMHG | RESPIRATION RATE: 18 BRPM | BODY MASS INDEX: 26.52 KG/M2 | OXYGEN SATURATION: 99 % | SYSTOLIC BLOOD PRESSURE: 117 MMHG | WEIGHT: 175 LBS | HEIGHT: 68 IN | HEART RATE: 99 BPM

## 2025-01-14 DIAGNOSIS — R65.20 SEVERE SEPSIS (H): ICD-10-CM

## 2025-01-14 DIAGNOSIS — Z09 HOSPITAL DISCHARGE FOLLOW-UP: Primary | ICD-10-CM

## 2025-01-14 DIAGNOSIS — N12 PYELONEPHRITIS: ICD-10-CM

## 2025-01-14 DIAGNOSIS — A41.9 SEVERE SEPSIS (H): ICD-10-CM

## 2025-01-14 DIAGNOSIS — E11.65 TYPE 2 DIABETES MELLITUS WITH HYPERGLYCEMIA, WITHOUT LONG-TERM CURRENT USE OF INSULIN (H): ICD-10-CM

## 2025-01-14 PROCEDURE — G2211 COMPLEX E/M VISIT ADD ON: HCPCS | Performed by: FAMILY MEDICINE

## 2025-01-14 PROCEDURE — 99214 OFFICE O/P EST MOD 30 MIN: CPT | Performed by: FAMILY MEDICINE

## 2025-01-14 RX ORDER — METFORMIN HYDROCHLORIDE 500 MG/1
1000 TABLET, EXTENDED RELEASE ORAL 2 TIMES DAILY WITH MEALS
Qty: 120 TABLET | Refills: 11 | Status: SHIPPED | OUTPATIENT
Start: 2025-01-14

## 2025-01-14 ASSESSMENT — PAIN SCALES - GENERAL: PAINLEVEL_OUTOF10: NO PAIN (0)

## 2025-01-14 NOTE — ASSESSMENT & PLAN NOTE
Diabetes far out of control.  Most recent A1c upon hospital mission 9.8.    Previously reports good control with metformin and Jardiance.  Lost insurance, out of medications for 6 or 7 months before hospitalization    Was discharged on Januvia.  Has taken this medicine and does not like the way it makes him feel    Restart metformin and Jardiance.  Patient requesting information regarding healthy diet which she is working on.  Will refer to diabetes education.  He saw ophthalmology last year and is appointment this year    Follow-up with me 3 months.

## 2025-01-14 NOTE — ASSESSMENT & PLAN NOTE
Hospitalized 1/81/9/25 for urosepsis.  Received 2 doses of IV ceftriaxone and was discharged on ciprofloxacin.  Patient has 1 dose left.  Had a for 5 days.  Urine culture eventually grew out Klebsiella pneumonia sensitive to all except trimethoprim sulfa.  Blood cultures x 2 were negative.  Kidney function was normal.    MED REC REQUIRED  Post Medication Reconciliation Status: discharge medications reconciled and changed, per note/orders

## 2025-01-14 NOTE — ASSESSMENT & PLAN NOTE
Patient feeling well, good appearance, symptoms resolved.   [Normal Growth] : growth [Normal Development] : development [None] : No known medical problems [No Elimination Concerns] : elimination [No Feeding Concerns] : feeding [No Skin Concerns] : skin [Normal Sleep Pattern] : sleep [Family Routines] : family routines [Language Promotion and Communication] : language promotion and communication [Social Development] : social development [ Considerations] :  considerations [Safety] : safety [No Medication Changes] : No medication changes at this time [Father] : father [FreeTextEntry1] : \par 2.4 y/o male currently well with normal growth and development.  BMI @ 32%\par Continue cow's milk. Continue table foods, 3 meals with 2-3 snacks per day. Incorporate water daily in a sippy cup. \par Brush teeth twice a day with soft toothbrush. Recommend visit to dentist. \par When in car, keep child in rear-facing car seats until age 2, or until  the maximum height and weight for seat is reached. \par Put toddler to sleep in own bed. Help toddler to maintain consistent daily routines and sleep schedule. \par Toilet training discussed. Ensure home is safe. Use consistent, positive discipline. \par Read aloud to toddler. Limit screen time to no more than 2 hours per day.\par Masking, social distancing and hand hygiene reviewed.\par Lab slip for CBC/lead given will phone f/u with results - d/w parent the importance of testing & he agrees to go to lab.\par Vaccines UTD. \par Flu vaccine UTD. \par Return in 6 mo for well care\par Return sooner PRN\par Dad without questions at this time.\par

## 2025-01-14 NOTE — PROGRESS NOTES
Assessment/ Plan  Hospital discharge follow-up  Hospitalized 1/81/9/25 for urosepsis.  Received 2 doses of IV ceftriaxone and was discharged on ciprofloxacin.  Patient has 1 dose left.  Had a for 5 days.  Urine culture eventually grew out Klebsiella pneumonia sensitive to all except trimethoprim sulfa.  Blood cultures x 2 were negative.  Kidney function was normal.    MED REC REQUIRED  Post Medication Reconciliation Status: discharge medications reconciled and changed, per note/orders      Pyelonephritis  Symptoms resolved    Severe sepsis (H)  Patient feeling well, good appearance, symptoms resolved.    Type 2 diabetes mellitus with hyperglycemia, without long-term current use of insulin (H)  Diabetes far out of control.  Most recent A1c upon hospital mission 9.8.    Previously reports good control with metformin and Jardiance.  Lost insurance, out of medications for 6 or 7 months before hospitalization    Was discharged on Januvia.  Has taken this medicine and does not like the way it makes him feel    Restart metformin and Jardiance.  Patient requesting information regarding healthy diet which she is working on.  Will refer to diabetes education.  He saw ophthalmology last year and is appointment this year    Follow-up with me 3 months.   Subjective  CC:  chief complaint  HPI:  39-year-old, here for hospital discharge follow-up as above.  Also diabetes.  Reports that he was out of his medications because of no insurance for 6 or 7 months.  He now has insurance back.  Reports that when he was taking metformin and Jardiance, he will check his blood sugars once a day and generally they were around 140 in the morning.  PFSH:  Patient Active Problem List   Diagnosis    Type 2 diabetes mellitus with hyperglycemia, without long-term current use of insulin (H)    Mixed dyslipidemia    Sinus tachycardia    Lactic acidosis    Pyelonephritis    Severe sepsis (H)    Fever, unspecified fever cause    Leukocytosis,  "unspecified type    Hospital discharge follow-up     Current Outpatient Medications   Medication Sig Dispense Refill    blood glucose (NO BRAND SPECIFIED) lancets standard Use to test blood sugar 1 times daily or as directed. 100 Lancet 3    ciprofloxacin (CIPRO) 500 MG tablet Take 1 tablet (500 mg) by mouth 2 times daily for 5 days. 10 tablet 0    empagliflozin (JARDIANCE) 25 MG TABS tablet Take 1 tablet (25 mg) by mouth daily. 90 tablet 1    metFORMIN (GLUCOPHAGE XR) 500 MG 24 hr tablet Take 2 tablets (1,000 mg) by mouth 2 times daily (with meals). Then take 2 tablets in the AM and 2 in the PM after that. 120 tablet 11    acetaminophen (TYLENOL) 500 MG tablet Take 500 mg by mouth every 6 hours as needed for mild pain. (Patient not taking: Reported on 1/14/2025)       No current facility-administered medications for this visit.        History   Smoking Status    Never   Smokeless Tobacco    Never     Social History     Social History Narrative    Not employed- [employer], 3 children     Patient Care Team:  No Ref-Primary, Physician as PCP - [facility] as Pharmacist (Pharmacist)  Miryam Ruiz MD as Assigned PCP  Africa Perez CNP as Assigned Neuroscience Provider        Objective  Physical Exam  Vitals:    01/14/25 1432   BP: 117/86   BP Location: Left arm   Patient Position: Sitting   Cuff Size: Adult Regular   Pulse: 99   Resp: 18   Temp: 97.7  F (36.5  C)   TempSrc: Tympanic   SpO2: 99%   Weight: 79.4 kg (175 lb)   Height: 1.727 m (5' 8\")     Body mass index is 26.61 kg/m .  Gen- alert, oriented   No acute distress  Chest- Normal inspiration and expiration.    Clear to ascultation.    No chest wall deformity or scar.  CV- Heart regular rate and rhythm  normal tones   no murmurs   No gallops or rubs.  Ext- warm and dry   no edema  Skin-  no visualized rash  Foot monofilament test performed-  Sensation intact      Diagnostics:   No results found for any visits on " 01/14/25.        Please note: Voice recognition software was used in this dictation.  It may therefore contain typographical errors.      Signed Electronically by: Irving Weldon MD

## 2025-01-31 ENCOUNTER — TELEPHONE (OUTPATIENT)
Dept: FAMILY MEDICINE | Facility: CLINIC | Age: 40
End: 2025-01-31
Payer: COMMERCIAL

## 2025-02-06 ENCOUNTER — ALLIED HEALTH/NURSE VISIT (OUTPATIENT)
Dept: EDUCATION SERVICES | Facility: CLINIC | Age: 40
End: 2025-02-06
Attending: FAMILY MEDICINE
Payer: COMMERCIAL

## 2025-02-06 DIAGNOSIS — E11.65 TYPE 2 DIABETES MELLITUS WITH HYPERGLYCEMIA, WITHOUT LONG-TERM CURRENT USE OF INSULIN (H): ICD-10-CM

## 2025-02-06 NOTE — LETTER
2/6/2025         RE: Deonna Honeycutt  [address] [place] [address]        Dear Colleague,    Thank you for referring your patient, Deonna Honeycutt, to the [facility]. Please see a copy of my visit note below.    Diabetes Self-Management Education & Support 85 minutes through professional  Micki, # [id number]    Presents for: Individual review    Type of Service: In Person Visit      Assessment  A1C for Deonna increased from 7.6% in 10/23 to 9.8% in 1/25, he was out of his medication for 6-7 months due to insurance issues. He c/o polyuria, polydipsia and fatigue, but symptoms have improved since starting medication. He resumed Jardiance 25 mg and Metformin XR 2000 mg after his PCP visit on 1/8/25.  Deonna is checking his BG at home in the morning, he did not bring his meter to the clinic, but reports: 146,159,160,159 from this past week. We discussed using a CGM system which is covered, deonna would like to take time to think about this option.  Deonna is active at work with playing soccer 30 minutes, 5x/week. He has no other planned exercise. He is consuming 3 meals/day, 3-5 cups/rice with meals/vegetables and meat. He has a sweet beverage only 1-2x/month, otherwise just water and unsweetened tea.  He is UTD on a yearly eye exam, but he will need to schedule a dental exam. Deonna had may questions regarding healthy eating today which we discussed and the importance of SMBG to assess BG after meals and make adjustments with portions.     Patient's most recent   Lab Results   Component Value Date    A1C 9.8 01/08/2025     is not meeting goal of <8.0    Diabetes knowledge and skills assessment:   Patient is knowledgeable in diabetes management concepts related to: all topics reviewed    Based on learning assessment above, most appropriate setting for further diabetes education would be: Individual setting.    Care Plan and Education Provided:  Healthy Eating: Heart healthy diet and Portion  control, Being Active: Amount recommended (150 minutes moderate or 75 minutes vigorous activity and 2-3 days strength training per week), Monitoring: Frequency of monitoring, Individual glucose targets, and Log and interpret results, Taking Medication: When to take medication(s), Problem Solving: High glucose - causes, signs/symptoms, treatment and prevention, and Reducing Risks: Dental care, Eye care, Goal for A1c, how it relates to glucose and how often to check, and Preventing cardiovascular disease, including blood pressure goals, lipid goals, recommendations for cardioprotective medications, statins, and aspirin    Patient verbalized understanding of diabetes self-management education concepts discussed, opportunities for ongoing education and support, and recommendations provided today.    Plan  Test blood sugar twice daily: in the morning before eating, with goal to be under 130, and two hours after the start of a meal, with the goal to be under 180.  Take medications as prescribed.   Avoid sugary beverages.  Limit portions of rice at meals, so your blood sugar two hours later stays under 180.   Have fruit between meals as a snack and follow the recommended portions.   Add in 10-15 minutes of walking after meals as much as possible, plus continue to exercise 30 minutes five times per week with soccer.   Schedule a dental cleaning exam.    Topics to cover at upcoming visits: Monitoring, Taking Medication, and Reducing Risks    Follow-up:  Upcoming Diabetes Ed Appointments     Visit Type Date Time Department    DIABETES ED 2/6/2025  2:00 PM [facility] DIABETES EDUCATION    DIABETES ED 2/27/2025  2:00 PM [facility] DIABETES EDUCATION        See Care Plan for co-developed, patient-state behavior change goals.    Education Materials Provided:  - [facility] Understanding Diabetes Booklet   - My Plate Planner   - Blood Glucose Log Sheet   - Types of Diabetes Medicines  [facility] Diabetes book      Subjective/Objective  Lay  "is an 39 year old year old, presenting for the following diabetes education related to: Presents for: Individual review  Accompanied by: Self,   Diabetes education in the past 24mo: No  Diabetes type: Type 2  Disease course: Worsening  Other concerns:: Language barrier  Cultural Influences/Ethnic Background:  Not  or       Diabetes Symptoms & Complications:  Diabetes Related Symptoms: Polydipsia (increased thirst), Polyuria (increased urination), Visual change, Fatigue  Disease course: Worsening       Patient Problem List and Family Medical History reviewed for relevant medical history, current medical status, and diabetes risk factors.    Vitals:  There were no vitals taken for this visit.  Estimated body mass index is 26.61 kg/m  as calculated from the following:    Height as of 1/14/25: 1.727 m (5' 8\").    Weight as of 1/14/25: 79.4 kg (175 lb).   Last 3 BP:   BP Readings from Last 3 Encounters:   01/14/25 117/86   01/09/25 125/80   10/25/23 131/82       History   Smoking Status     Never   Smokeless Tobacco     Never       Labs:  Lab Results   Component Value Date    A1C 9.8 01/08/2025     Lab Results   Component Value Date     01/09/2025     07/29/2021    .0 07/02/2021     Lab Results   Component Value Date    LDL 77 10/13/2023     07/02/2021     HDL Cholesterol   Date Value Ref Range Status   07/02/2021 44.4 >40.0 mg/dL Final     Direct Measure HDL   Date Value Ref Range Status   10/13/2023 39 (L) >=40 mg/dL Final   ]  GFR Estimate   Date Value Ref Range Status   01/09/2025 >90 >60 mL/min/1.73m2 Final     Comment:     eGFR calculated using 2021 CKD-EPI equation.   07/19/2018 >60 >60 mL/min/1.73m2 Final   07/19/2018 >60 >60 mL/min/1.73m2 Final     GFR Estimate If Black   Date Value Ref Range Status   07/19/2018 >60 >60 mL/min/1.73m2 Final   07/19/2018 >60 >60 mL/min/1.73m2 Final     Lab Results   Component Value Date    CR 0.78 01/09/2025     No results found " "for: \"MICROALBUMIN\"    Healthy Eating:  Healthy Eating Assessed Today: Yes  Meals include: Breakfast, Lunch  Breakfast: rice/meat/veggies, sometimes sweet to drink, pepsi, coke/sprite  Lunch: work days: snack on a  sugared doughnut( 1x), or a burger from a gas station or small bag of beans,  Dinner: rice, meat, vegetables  Snacks: milk or veggies  Other: after evening meal, very little snacking, maybe veggies or milk , tends to stay away from fruits, sour omari or guava or  Beverages: Soda, Water, Tea (regular soda 1-2/month)    Being Active:  Being Active Assessed Today: Yes  Exercise:: Yes (plays soccer at work)  Days per week of moderate to strenuous exercise (like a brisk walk): 5  On average, minutes per day of exercise at this level: 30  Exercise Minutes per Week: 150    Monitoring:  Monitoring Assessed Today: Yes  Did patient bring glucose meter to appointment? : No  Blood Glucose Meter: Unknown  Times checking blood sugar at home (number): 1  Times checking blood sugar at home (per): Day    NA    Taking Medications:  Diabetes Medication(s)       Biguanides       metFORMIN (GLUCOPHAGE XR) 500 MG 24 hr tablet Take 2 tablets (1,000 mg) by mouth 2 times daily (with meals). Then take 2 tablets in the AM and 2 in the PM after that.       Sodium-Glucose Co-Transporter 2 (SGLT2) Inhibitors       empagliflozin (JARDIANCE) 25 MG TABS tablet Take 1 tablet (25 mg) by mouth daily.          Taking Medication Assessed Today: Yes  Current Treatments: Oral Medication (taken by mouth)  Problems taking diabetes medications regularly?:  (he ran out of medications ofr 6-7 months due to no insurance in 2024)    Problem Solving:  Problem Solving Assessed Today: Yes  Is the patient at risk for hypoglycemia?: No        Reducing Risks:  Reducing Risks Assessed Today: Yes  Diabetes Risks: Ethnicity  Has dilated eye exam at least once a year?: Yes  Sees dentist every 6 months?: No    Cynthia Kirkland RD  Time Spent: 85 minutes  Encounter " Type: Individual    Any diabetes medication dose changes were made via the [facility] Standing Orders under the patient's referring provider.

## 2025-02-06 NOTE — PROGRESS NOTES
Diabetes Self-Management Education & Support 85 minutes through professional  Micki, # [id number]    Presents for: Individual review    Type of Service: In Person Visit      Assessment  A1C for Deonna increased from 7.6% in 10/23 to 9.8% in 1/25, he was out of his medication for 6-7 months due to insurance issues. He c/o polyuria, polydipsia and fatigue, but symptoms have improved since starting medication. He resumed Jardiance 25 mg and Metformin XR 2000 mg after his PCP visit on 1/8/25.  Deonna is checking his BG at home in the morning, he did not bring his meter to the clinic, but reports: 146,159,160,159 from this past week. We discussed using a CGM system which is covered, deonna would like to take time to think about this option.  Deonna is active at work with playing soccer 30 minutes, 5x/week. He has no other planned exercise. He is consuming 3 meals/day, 3-5 cups/rice with meals/vegetables and meat. He has a sweet beverage only 1-2x/month, otherwise just water and unsweetened tea.  He is UTD on a yearly eye exam, but he will need to schedule a dental exam. Deonna had may questions regarding healthy eating today which we discussed and the importance of SMBG to assess BG after meals and make adjustments with portions.     Patient's most recent   Lab Results   Component Value Date    A1C 9.8 01/08/2025     is not meeting goal of <8.0    Diabetes knowledge and skills assessment:   Patient is knowledgeable in diabetes management concepts related to: all topics reviewed    Based on learning assessment above, most appropriate setting for further diabetes education would be: Individual setting.    Care Plan and Education Provided:  Healthy Eating: Heart healthy diet and Portion control, Being Active: Amount recommended (150 minutes moderate or 75 minutes vigorous activity and 2-3 days strength training per week), Monitoring: Frequency of monitoring, Individual glucose targets, and Log and interpret results, Taking  Medication: When to take medication(s), Problem Solving: High glucose - causes, signs/symptoms, treatment and prevention, and Reducing Risks: Dental care, Eye care, Goal for A1c, how it relates to glucose and how often to check, and Preventing cardiovascular disease, including blood pressure goals, lipid goals, recommendations for cardioprotective medications, statins, and aspirin    Patient verbalized understanding of diabetes self-management education concepts discussed, opportunities for ongoing education and support, and recommendations provided today.    Plan  Test blood sugar twice daily: in the morning before eating, with goal to be under 130, and two hours after the start of a meal, with the goal to be under 180.  Take medications as prescribed.   Avoid sugary beverages.  Limit portions of rice at meals, so your blood sugar two hours later stays under 180.   Have fruit between meals as a snack and follow the recommended portions.   Add in 10-15 minutes of walking after meals as much as possible, plus continue to exercise 30 minutes five times per week with soccer.   Schedule a dental cleaning exam.    Topics to cover at upcoming visits: Monitoring, Taking Medication, and Reducing Risks    Follow-up:  Upcoming Diabetes Ed Appointments     Visit Type Date Time Department    DIABETES ED 2/6/2025  2:00 PM [facility] DIABETES EDUCATION    DIABETES ED 2/27/2025  2:00 PM [facility] DIABETES EDUCATION        See Care Plan for co-developed, patient-state behavior change goals.    Education Materials Provided:  -  [employer] [place] Understanding Diabetes Booklet   - My Plate Planner   - Blood Glucose Log Sheet   - Types of Diabetes Medicines  [facility] Diabetes book      Subjective/Objective  Lay is an 39 year old year old, presenting for the following diabetes education related to: Presents for: Individual review  Accompanied by: Self,   Diabetes education in the past 24mo: No  Diabetes type: Type 2  Disease course:  "Worsening  Other concerns:: Language barrier  Cultural Influences/Ethnic Background:  Not  or       Diabetes Symptoms & Complications:  Diabetes Related Symptoms: Polydipsia (increased thirst), Polyuria (increased urination), Visual change, Fatigue  Disease course: Worsening       Patient Problem List and Family Medical History reviewed for relevant medical history, current medical status, and diabetes risk factors.    Vitals:  There were no vitals taken for this visit.  Estimated body mass index is 26.61 kg/m  as calculated from the following:    Height as of 1/14/25: 1.727 m (5' 8\").    Weight as of 1/14/25: 79.4 kg (175 lb).   Last 3 BP:   BP Readings from Last 3 Encounters:   01/14/25 117/86   01/09/25 125/80   10/25/23 131/82       History   Smoking Status    Never   Smokeless Tobacco    Never       Labs:  Lab Results   Component Value Date    A1C 9.8 01/08/2025     Lab Results   Component Value Date     01/09/2025     07/29/2021    .0 07/02/2021     Lab Results   Component Value Date    LDL 77 10/13/2023     07/02/2021     HDL Cholesterol   Date Value Ref Range Status   07/02/2021 44.4 >40.0 mg/dL Final     Direct Measure HDL   Date Value Ref Range Status   10/13/2023 39 (L) >=40 mg/dL Final   ]  GFR Estimate   Date Value Ref Range Status   01/09/2025 >90 >60 mL/min/1.73m2 Final     Comment:     eGFR calculated using 2021 CKD-EPI equation.   07/19/2018 >60 >60 mL/min/1.73m2 Final   07/19/2018 >60 >60 mL/min/1.73m2 Final     GFR Estimate If Black   Date Value Ref Range Status   07/19/2018 >60 >60 mL/min/1.73m2 Final   07/19/2018 >60 >60 mL/min/1.73m2 Final     Lab Results   Component Value Date    CR 0.78 01/09/2025     No results found for: \"MICROALBUMIN\"    Healthy Eating:  Healthy Eating Assessed Today: Yes  Meals include: Breakfast, Lunch  Breakfast: rice/meat/veggies, sometimes sweet to drink, pepsi, coke/sprite  Lunch: work days: snack on a  sugared doughnut( 1x), " or a burger from a gas station or small bag of beans,  Dinner: rice, meat, vegetables  Snacks: milk or veggies  Other: after evening meal, very little snacking, maybe veggies or milk , tends to stay away from fruits, sour omari or guava or  Beverages: Soda, Water, Tea (regular soda 1-2/month)    Being Active:  Being Active Assessed Today: Yes  Exercise:: Yes (plays soccer at work)  Days per week of moderate to strenuous exercise (like a brisk walk): 5  On average, minutes per day of exercise at this level: 30  Exercise Minutes per Week: 150    Monitoring:  Monitoring Assessed Today: Yes  Did patient bring glucose meter to appointment? : No  Blood Glucose Meter: Unknown  Times checking blood sugar at home (number): 1  Times checking blood sugar at home (per): Day    NA    Taking Medications:  Diabetes Medication(s)       Biguanides       metFORMIN (GLUCOPHAGE XR) 500 MG 24 hr tablet Take 2 tablets (1,000 mg) by mouth 2 times daily (with meals). Then take 2 tablets in the AM and 2 in the PM after that.       Sodium-Glucose Co-Transporter 2 (SGLT2) Inhibitors       empagliflozin (JARDIANCE) 25 MG TABS tablet Take 1 tablet (25 mg) by mouth daily.          Taking Medication Assessed Today: Yes  Current Treatments: Oral Medication (taken by mouth)  Problems taking diabetes medications regularly?:  (he ran out of medications ofr 6-7 months due to no insurance in 2024)    Problem Solving:  Problem Solving Assessed Today: Yes  Is the patient at risk for hypoglycemia?: No        Reducing Risks:  Reducing Risks Assessed Today: Yes  Diabetes Risks: Ethnicity  Has dilated eye exam at least once a year?: Yes  Sees dentist every 6 months?: No    Cynthia Kirkland RD  Time Spent: 85 minutes  Encounter Type: Individual    Any diabetes medication dose changes were made via the [facility] Standing Orders under the patient's referring provider.

## 2025-02-06 NOTE — PATIENT INSTRUCTIONS
Test blood sugar twice daily: in the morning before eating, with goal to be under 130, and two hours after the start of a meal, with the goal to be under 180.  Take medications as prescribed.   Avoid sugary beverages.  Limit portions of rice at meals, so your blood sugar two hours later stays under 180.   Have fruit between meals as a snack and follow the recommended portions.   Add in 10-15 minutes of walking after meals as much as possible, plus continue to exercise 30 minutes five times per week with soccer.   Schedule a dental cleaning exam.

## 2025-02-10 NOTE — TELEPHONE ENCOUNTER
Patient is looking for a refill on Januvia 100 mg  
Pt was changed from Januvia to Jardiance on 1/14/25.  Please confirm with patient.    Type 2 diabetes mellitus with hyperglycemia, without long-term current use of insulin (H)  Diabetes far out of control.  Most recent A1c upon hospital mission 9.8.     Previously reports good control with metformin and Jardiance.  Lost insurance, out of medications for 6 or 7 months before hospitalization     Was discharged on Januvia.  Has taken this medicine and does not like the way it makes him feel     Restart metformin and Jardiance.  Patient requesting information regarding healthy diet which she is working on.  Will refer to diabetes education.  He saw ophthalmology last year and is appointment this year    EUGENE Huizar, BSN, PHN, RN-[facility]  [phone]    
RN attempted to contact patient using an , but no answer.  left message on patient's voice mail to call clinic back.    If patient calls back, please verify medication that needs a refill. Thanks    Shama Kaba RN  [facility]    Patient is looking for a refill on Januvia 100 mg  Pt was changed from Januvia to Jardiance on 1/14/25.  Please confirm with patient.    
RN called with a eBll . Pt confirmed he takes metformin and jardiance. Medications are available to  today. Pt verbalized understanding.     Aristides QUEZADA RN  [facility]    
Yes - the patient is able to be screened

## 2025-02-27 ENCOUNTER — ALLIED HEALTH/NURSE VISIT (OUTPATIENT)
Dept: EDUCATION SERVICES | Facility: CLINIC | Age: 40
End: 2025-02-27
Payer: COMMERCIAL

## 2025-02-27 DIAGNOSIS — E11.65 TYPE 2 DIABETES MELLITUS WITH HYPERGLYCEMIA, WITHOUT LONG-TERM CURRENT USE OF INSULIN (H): Primary | ICD-10-CM

## 2025-02-27 RX ORDER — BLOOD SUGAR DIAGNOSTIC
STRIP MISCELLANEOUS
Qty: 100 STRIP | Refills: 6 | Status: SHIPPED | OUTPATIENT
Start: 2025-02-27

## 2025-02-27 RX ORDER — LANCETS
EACH MISCELLANEOUS
Qty: 100 EACH | Refills: 4 | Status: SHIPPED | OUTPATIENT
Start: 2025-02-27

## 2025-02-27 RX ORDER — BLOOD SUGAR DIAGNOSTIC
STRIP MISCELLANEOUS
Qty: 100 STRIP | Refills: 6 | Status: SHIPPED | OUTPATIENT
Start: 2025-02-27 | End: 2025-02-27

## 2025-02-27 RX ORDER — HYDROCHLOROTHIAZIDE 12.5 MG/1
CAPSULE ORAL
Qty: 6 EACH | Refills: 3 | Status: SHIPPED | OUTPATIENT
Start: 2025-02-27

## 2025-02-27 NOTE — PROGRESS NOTES
Diabetes Self-Management Education & Support/ 35 minutes through professional  # [id number]    Presents for: Individual review    Type of Service: In Person Visit      Assessment  Lay decreased his rice portions to one small bowl this past month and cut out sugary beverages. 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 He continues to take Jardiance 25 mg and metformin 2000 mg. He is checking his BG twice daily: FBS-averaging 127-157,with 9% < 130 mg/dl and PP-averaging 128-197, with 78% < 180 mg/dl. He is interested in trying the farshad 3+ and he will use the blake. Lay called a dental clinic for an appointment but they are out a few months, I encourage him to schedule to get an appointment on the calendar.     Patient's most recent   Lab Results   Component Value Date    A1C 9.8 01/08/2025     is not meeting goal of <8.0    Diabetes knowledge and skills assessment:   Patient is knowledgeable in diabetes management concepts related to: Healthy Eating, Being Active, Monitoring, and Taking Medication    Based on learning assessment above, most appropriate setting for further diabetes education would be: Individual setting.    Care Plan and Education Provided:  Healthy Eating: Portion control, Being Active: Amount recommended (150 minutes moderate or 75 minutes vigorous activity and 2-3 days strength training per week), Monitoring: Frequency of monitoring, Individual glucose targets, and Log and interpret results, Taking Medication: Action of prescribed medication(s), Reducing Risks: Dental care, Goal for A1c, how it relates to glucose and how often to check, and Preventing cardiovascular disease, including blood pressure goals, lipid goals, recommendations for cardioprotective medications, statins, and aspirin, and Healthy Coping: Benefits of making appropriate lifestyle changes    Patient verbalized understanding of diabetes self-management  education concepts discussed, opportunities for ongoing education and support, and recommendations provided today.    Plan  Test blood sugar twice daily: in the morning before eating, with goal to be under 130, and two hours after the start of a meal, with the goal to be under 180.  Take medications as prescribed.   Avoid sugary beverages.  Limit portions of rice at meals, so your blood sugar two hours later stays under 180.   Have fruit between meals as a snack and follow the recommended portions.   Continue to exercise 30 minutes a minimum of six days per week.    Schedule a dental cleaning exam.   sensors at the pharmacy and bring to your appointment on March 13th.    Topics to cover at upcoming visits: CGM set up    Follow-up:  Upcoming Diabetes Ed Appointments     Visit Type Date Time Department    DIABETES ED 2/27/2025  2:00 PM [facility] DIABETES EDUC[facility]    DIABETES ED 3/13/2025  2:30 PM [facility] DIABETES EDUCATION        See Care Plan for co-developed, patient-state behavior change goals.    Education Materials Provided:  No new materials provided today      Subjective/Objective  Lay is an 39 year old year old, presenting for the following diabetes education related to: Presents for: Individual review  Accompanied by: Self,   Diabetes education in the past 24mo: Yes  Focus of Visit: Monitoring, Reducing Risks, Taking Medication, Healthy Eating, Being Active  Diabetes type: Type 2  Disease course: Improving  Transportation concerns: No  Other concerns:: Language barrier  Cultural Influences/Ethnic Background:  Not  or       Diabetes Symptoms & Complications:  Diabetes Related Symptoms: None  Weight trend: Stable  Disease course: Improving       Patient Problem List and Family Medical History reviewed for relevant medical history, current medical status, and diabetes risk factors.    Vitals:  There were no vitals taken for this visit.  Estimated body mass index is 26.61 kg/m  as  "calculated from the following:    Height as of 1/14/25: 1.727 m (5' 8\").    Weight as of 1/14/25: 79.4 kg (175 lb).   Last 3 BP:   BP Readings from Last 3 Encounters:   01/14/25 117/86   01/09/25 125/80   10/25/23 131/82       History   Smoking Status    Never   Smokeless Tobacco    Never       Labs:  Lab Results   Component Value Date    A1C 9.8 01/08/2025     Lab Results   Component Value Date     01/09/2025     07/29/2021    .0 07/02/2021     Lab Results   Component Value Date    LDL 77 10/13/2023     07/02/2021     HDL Cholesterol   Date Value Ref Range Status   07/02/2021 44.4 >40.0 mg/dL Final     Direct Measure HDL   Date Value Ref Range Status   10/13/2023 39 (L) >=40 mg/dL Final   ]  GFR Estimate   Date Value Ref Range Status   01/09/2025 >90 >60 mL/min/1.73m2 Final     Comment:     eGFR calculated using 2021 CKD-EPI equation.   07/19/2018 >60 >60 mL/min/1.73m2 Final   07/19/2018 >60 >60 mL/min/1.73m2 Final     GFR Estimate If Black   Date Value Ref Range Status   07/19/2018 >60 >60 mL/min/1.73m2 Final   07/19/2018 >60 >60 mL/min/1.73m2 Final     Lab Results   Component Value Date    CR 0.78 01/09/2025     No results found for: \"MICROALBUMIN\"    Healthy Eating:  Healthy Eating Assessed Today: Yes  Meals include: Breakfast, Lunch  Breakfast: rice/meat/veggies, sometimes sweet to drink, pepsi, coke/sprite  Lunch: work days: snack on a  sugared doughnut( 1x), or a burger from a gas station or small bag of beans,  Dinner: rice, meat, vegetables  Snacks: milk or veggies  Other: 2/27/25: patient decreased rice to one small bowl per meal and added more vegetables, no sugary drinks. after evening meal, very little snacking, maybe veggies or milk , tends to stay away from fruits, sour omari or guava or  Beverages: Soda, Water, Tea (regular soda 1-2/month)    Being Active:  Being Active Assessed Today: Yes  Exercise:: Yes (plays soccer at work)  Days per week of moderate to strenuous " exercise (like a brisk walk): 6  On average, minutes per day of exercise at this level: 30  Exercise Minutes per Week: 180    Monitoring:  Monitoring Assessed Today: Yes  Did patient bring glucose meter to appointment? : Yes  Blood Glucose Meter: Unknown, Accu-chek  Times checking blood sugar at home (number): 2  Times checking blood sugar at home (per): Day    FBS: 154,137,136,135,133,139,143,142,127,128,134,136,131,143,150,157,140,155,141,150,150  PP: 157,137,178,136,128,129,173,197,161,144,195,168,157,157,195,194,129,180,125    Taking Medications:  Diabetes Medication(s)       Biguanides       metFORMIN (GLUCOPHAGE XR) 500 MG 24 hr tablet Take 2 tablets (1,000 mg) by mouth 2 times daily (with meals). Then take 2 tablets in the AM and 2 in the PM after that.       Sodium-Glucose Co-Transporter 2 (SGLT2) Inhibitors       empagliflozin (JARDIANCE) 25 MG TABS tablet Take 1 tablet (25 mg) by mouth daily.          Taking Medication Assessed Today: Yes  Current Treatments: Oral Medication (taken by mouth)  Problems taking diabetes medications regularly?: No (he ran out of medications ofr 6-7 months due to no insurance in 2024)  Reducing Risks:  Reducing Risks Assessed Today: Yes  Diabetes Risks: Ethnicity  Has dilated eye exam at least once a year?: Yes  Sees dentist every 6 months?: No    Healthy Coping:  Healthy Coping Assessed Today: Yes  Emotional response to diabetes: Ready to learn  Stage of change: ACTION (Actively working towards change)    Cynthia Kirkland RD  Time Spent: 35 minutes  Encounter Type: Individual    Any diabetes medication dose changes were made via the CDCES Standing Orders under the patient's referring provider.

## 2025-02-27 NOTE — LETTER
2/27/2025         RE: Deonna Honeycutt  [address] [place] [address]        Dear Colleague,    Thank you for referring your patient, Deonna Honeycutt, to the [facility]. Please see a copy of my visit note below.    Diabetes Self-Management Education & Support/ 35 minutes through professional  # [id number]    Presents for: Individual review    Type of Service: In Person Visit      Assessment  Deonna decreased his rice portions to one small bowl this past month and cut out sugary beverages. 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 He continues to take Jardiance 25 mg and metformin 2000 mg. He is checking his BG twice daily: FBS-averaging 127-157,with 9% < 130 mg/dl and PP-averaging 128-197, with 78% < 180 mg/dl. He is interested in trying the farshad 3+ and he will use the blake. Deonna called a dental clinic for an appointment but they are out a few months, I encourage him to schedule to get an appointment on the calendar.     Patient's most recent   Lab Results   Component Value Date    A1C 9.8 01/08/2025     is not meeting goal of <8.0    Diabetes knowledge and skills assessment:   Patient is knowledgeable in diabetes management concepts related to: Healthy Eating, Being Active, Monitoring, and Taking Medication    Based on learning assessment above, most appropriate setting for further diabetes education would be: Individual setting.    Care Plan and Education Provided:  Healthy Eating: Portion control, Being Active: Amount recommended (150 minutes moderate or 75 minutes vigorous activity and 2-3 days strength training per week), Monitoring: Frequency of monitoring, Individual glucose targets, and Log and interpret results, Taking Medication: Action of prescribed medication(s), Reducing Risks: Dental care, Goal for A1c, how it relates to glucose and how often to check, and Preventing cardiovascular disease,  including blood pressure goals, lipid goals, recommendations for cardioprotective medications, statins, and aspirin, and Healthy Coping: Benefits of making appropriate lifestyle changes    Patient verbalized understanding of diabetes self-management education concepts discussed, opportunities for ongoing education and support, and recommendations provided today.    Plan  Test blood sugar twice daily: in the morning before eating, with goal to be under 130, and two hours after the start of a meal, with the goal to be under 180.  Take medications as prescribed.   Avoid sugary beverages.  Limit portions of rice at meals, so your blood sugar two hours later stays under 180.   Have fruit between meals as a snack and follow the recommended portions.   Continue to exercise 30 minutes a minimum of six days per week.    Schedule a dental cleaning exam.   sensors at the pharmacy and bring to your appointment on March 13th.    Topics to cover at upcoming visits: CGM set up    Follow-up:  Upcoming Diabetes Ed Appointments     Visit Type Date Time Department    DIABETES ED 2/27/2025  2:00 PM [facility] DIABET[facility] EDUC[facility]    DIABETES ED 3/13/2025  2:30 PM [facility] DIABETES EDUCATION        See Care Plan for co-developed, patient-state behavior change goals.    Education Materials Provided:  No new materials provided today      Subjective/Objective  Lay is an 39 year old year old, presenting for the following diabetes education related to: Presents for: Individual review  Accompanied by: Self,   Diabetes education in the past 24mo: Yes  Focus of Visit: Monitoring, Reducing Risks, Taking Medication, Healthy Eating, Being Active  Diabetes type: Type 2  Disease course: Improving  Transportation concerns: No  Other concerns:: Language barrier  Cultural Influences/Ethnic Background:  Not  or       Diabetes Symptoms & Complications:  Diabetes Related Symptoms: None  Weight trend: Stable  Disease course: Improving    "    Patient Problem List and Family Medical History reviewed for relevant medical history, current medical status, and diabetes risk factors.    Vitals:  There were no vitals taken for this visit.  Estimated body mass index is 26.61 kg/m  as calculated from the following:    Height as of 1/14/25: 1.727 m (5' 8\").    Weight as of 1/14/25: 79.4 kg (175 lb).   Last 3 BP:   BP Readings from Last 3 Encounters:   01/14/25 117/86   01/09/25 125/80   10/25/23 131/82       History   Smoking Status     Never   Smokeless Tobacco     Never       Labs:  Lab Results   Component Value Date    A1C 9.8 01/08/2025     Lab Results   Component Value Date     01/09/2025     07/29/2021    .0 07/02/2021     Lab Results   Component Value Date    LDL 77 10/13/2023     07/02/2021     HDL Cholesterol   Date Value Ref Range Status   07/02/2021 44.4 >40.0 mg/dL Final     Direct Measure HDL   Date Value Ref Range Status   10/13/2023 39 (L) >=40 mg/dL Final   ]  GFR Estimate   Date Value Ref Range Status   01/09/2025 >90 >60 mL/min/1.73m2 Final     Comment:     eGFR calculated using 2021 CKD-EPI equation.   07/19/2018 >60 >60 mL/min/1.73m2 Final   07/19/2018 >60 >60 mL/min/1.73m2 Final     GFR Estimate If Black   Date Value Ref Range Status   07/19/2018 >60 >60 mL/min/1.73m2 Final   07/19/2018 >60 >60 mL/min/1.73m2 Final     Lab Results   Component Value Date    CR 0.78 01/09/2025     No results found for: \"MICROALBUMIN\"    Healthy Eating:  Healthy Eating Assessed Today: Yes  Meals include: Breakfast, Lunch  Breakfast: rice/meat/veggies, sometimes sweet to drink, pepsi, coke/sprite  Lunch: work days: snack on a  sugared doughnut( 1x), or a burger from a gas station or small bag of beans,  Dinner: rice, meat, vegetables  Snacks: milk or veggies  Other: 2/27/25: patient decreased rice to one small bowl per meal and added more vegetables, no sugary drinks. after evening meal, very little snacking, maybe veggies or milk , " tends to stay away from fruits, sour omari or guava or  Beverages: Soda, Water, Tea (regular soda 1-2/month)    Being Active:  Being Active Assessed Today: Yes  Exercise:: Yes (plays soccer at work)  Days per week of moderate to strenuous exercise (like a brisk walk): 6  On average, minutes per day of exercise at this level: 30  Exercise Minutes per Week: 180    Monitoring:  Monitoring Assessed Today: Yes  Did patient bring glucose meter to appointment? : Yes  Blood Glucose Meter: Unknown, Accu-chek  Times checking blood sugar at home (number): 2  Times checking blood sugar at home (per): Day    FBS: 154,137,136,135,133,139,143,142,127,128,134,136,131,143,150,157,140,155,141,150,150  PP: 157,137,178,136,128,129,173,197,161,144,195,168,157,157,195,194,129,180,125    Taking Medications:  Diabetes Medication(s)       Biguanides       metFORMIN (GLUCOPHAGE XR) 500 MG 24 hr tablet Take 2 tablets (1,000 mg) by mouth 2 times daily (with meals). Then take 2 tablets in the AM and 2 in the PM after that.       Sodium-Glucose Co-Transporter 2 (SGLT2) Inhibitors       empagliflozin (JARDIANCE) 25 MG TABS tablet Take 1 tablet (25 mg) by mouth daily.          Taking Medication Assessed Today: Yes  Current Treatments: Oral Medication (taken by mouth)  Problems taking diabetes medications regularly?: No (he ran out of medications ofr 6-7 months due to no insurance in 2024)  Reducing Risks:  Reducing Risks Assessed Today: Yes  Diabetes Risks: Ethnicity  Has dilated eye exam at least once a year?: Yes  Sees dentist every 6 months?: No    Healthy Coping:  Healthy Coping Assessed Today: Yes  Emotional response to diabetes: Ready to learn  Stage of change: ACTION (Actively working towards change)    Cynthia Kirkland RD  Time Spent: 35 minutes  Encounter Type: Individual    Any diabetes medication dose changes were made via the CDCES Standing Orders under the patient's referring provider.

## 2025-02-27 NOTE — PATIENT INSTRUCTIONS
Test blood sugar twice daily: in the morning before eating, with goal to be under 130, and two hours after the start of a meal, with the goal to be under 180.  Take medications as prescribed.   Avoid sugary beverages.  Limit portions of rice at meals, so your blood sugar two hours later stays under 180.   Have fruit between meals as a snack and follow the recommended portions.   Continue to exercise 30 minutes a minimum of six days per week.    Schedule a dental cleaning exam.   sensors at the pharmacy and bring to your appointment on March 13th.

## 2025-03-13 ENCOUNTER — ALLIED HEALTH/NURSE VISIT (OUTPATIENT)
Dept: EDUCATION SERVICES | Facility: CLINIC | Age: 40
End: 2025-03-13
Payer: COMMERCIAL

## 2025-03-13 DIAGNOSIS — E11.65 TYPE 2 DIABETES MELLITUS WITH HYPERGLYCEMIA, WITHOUT LONG-TERM CURRENT USE OF INSULIN (H): Primary | ICD-10-CM

## 2025-03-13 NOTE — PATIENT INSTRUCTIONS
Take medications as prescribed.   If your farshad 3+ sensor falls off before the 15 days are over or stops working, call the company and ask for a replacement: [phone]  Avoid sugary beverages.  Limit portions of rice at meals, add more meat or fish or tofu or eggs, and vegetables..   Have fruit between meals as a snack and follow the recommended portions.   Continue to exercise 30 minutes a minimum of six days per week.    Schedule a dental cleaning exam.

## 2025-03-13 NOTE — PROGRESS NOTES
Diabetes Self-Management Education & Support 50 minutes through professional  Chloé Livingston    Presents for: Individual review    Type of Service: In Person Visit    Assessment   Deonna has been checking BG 1-2x/day: FBS, post meal or bedtime. FBS are improving with 30% < 130 and 25%  at 130 or 131.  PP are 75% at goal of < 180 mg/dl. He presents to day for instruction on setting up Ghada 3+ with blake( see below). Deonna is active with soccer at his job, 30 minutes, 5x/week. He has been more busy on the weekends so he has not been able to add in additional exercise. He has cut back on rice portions and cut out sugary beverages. Deonna is currently taking Metformin 2000 mg and Jardiance 25 mg.             Sensor was inserted with no resistance or bleeding at insertion site.     CGM-specific education provided on:    Freestyle Ghada sensor: insertion technique, sensor site location and rotation, insulin administration in relation to sensor placement, sensor wear, reasons to remove sensor (MRI, CT, diathermy), Vitamin C & Aspirin effects on sensor, Use of trends and graphs for pattern management and problem solving, and reviewed features of phone blake    Care Plan and Education Provided:  Healthy Eating: Portion control, Being Active: Amount recommended (150 minutes moderate or 75 minutes vigorous activity and 2-3 days strength training per week), Monitoring: Blood glucose versus Continuous Glucose Monitoring, Taking Medication: When to take medication(s), Problem Solving: High glucose - causes, signs/symptoms, treatment and prevention, Low glucose - causes, signs/symptoms, treatment and prevention, and Rule of 15 and carrying a carbohydrate source at all times in case of low glucose, Reducing Risks: Goal for A1c, how it relates to glucose and how often to check, and Healthy Coping: Benefits of making appropriate lifestyle changes    Patient verbalized understanding of diabetes self-management education concepts  "discussed, opportunities for ongoing education and support, and recommendations provided today.    Plan  Take medications as prescribed.   If your farshad 3+ sensor falls off before the 15 days are over or stops working, call the company and ask for a replacement: [phone]  Avoid sugary beverages.  Limit portions of rice at meals, add more meat or fish or tofu or eggs, and vegetables..   Have fruit between meals as a snack and follow the recommended portions.   Continue to exercise 30 minutes a minimum of six days per week.    Schedule a dental cleaning exam.    Topics to cover at upcoming visits: Monitoring and Taking Medication    Follow-up:  Upcoming Diabetes Ed Appointments     Visit Type Date Time Department    DIABETES ED 3/13/2025  2:30 PM [facility] DIABETES EDUC[facility]    DIABETES ED 3/27/2025  1:30 PM [facility] DIABETES EDUCATION        See Care Plan for co-developed, patient-state behavior change goals.    Education Materials Provided:  No new materials provided today      Subjective/Objective  Lay is an 39 year old year old, presenting for the following diabetes education related to: Presents for: Individual review  Accompanied by: Self,   Diabetes education in the past 24mo: Yes  Focus of Visit: Monitoring, Taking Medication, Healthy Eating, Being Active  Diabetes type: Type 2  Disease course: Improving  Transportation concerns: No  Other concerns:: Language barrier  Cultural Influences/Ethnic Background:  Not  or       Diabetes Symptoms & Complications:  Diabetes Related Symptoms: None  Weight trend: Stable  Disease course: Improving       Patient Problem List and Family Medical History reviewed for relevant medical history, current medical status, and diabetes risk factors.    Vitals:  There were no vitals taken for this visit.  Estimated body mass index is 26.61 kg/m  as calculated from the following:    Height as of 1/14/25: 1.727 m (5' 8\").    Weight as of 1/14/25: 79.4 kg (175 lb). " "  Last 3 BP:   BP Readings from Last 3 Encounters:   01/14/25 117/86   01/09/25 125/80   10/25/23 131/82       History   Smoking Status    Never   Smokeless Tobacco    Never       Labs:  Lab Results   Component Value Date    A1C 9.8 01/08/2025     Lab Results   Component Value Date     01/09/2025     07/29/2021    .0 07/02/2021     Lab Results   Component Value Date    LDL 77 10/13/2023     07/02/2021     HDL Cholesterol   Date Value Ref Range Status   07/02/2021 44.4 >40.0 mg/dL Final     Direct Measure HDL   Date Value Ref Range Status   10/13/2023 39 (L) >=40 mg/dL Final   ]  GFR Estimate   Date Value Ref Range Status   01/09/2025 >90 >60 mL/min/1.73m2 Final     Comment:     eGFR calculated using 2021 CKD-EPI equation.   07/19/2018 >60 >60 mL/min/1.73m2 Final   07/19/2018 >60 >60 mL/min/1.73m2 Final     GFR Estimate If Black   Date Value Ref Range Status   07/19/2018 >60 >60 mL/min/1.73m2 Final   07/19/2018 >60 >60 mL/min/1.73m2 Final     Lab Results   Component Value Date    CR 0.78 01/09/2025     No results found for: \"MICROALBUMIN\"    Healthy Eating:  Healthy Eating Assessed Today: Yes  Meals include: Breakfast, Lunch  Breakfast: rice/meat/veggies,  Lunch: work days: snack on a  sugared doughnut( 1x), or a burger from a gas station or small bag of beans,  Dinner: rice, meat, vegetables  Snacks: milk or veggies  Other: 2/27/25: patient decreased rice to one small bowl per meal and added more vegetables, no sugary drinks. after evening meal, very little snacking, maybe veggies or milk , tends to stay away from fruits, sour omari or guava or  Beverages: Water, Tea (regular soda 1-2/month)  Has patient met with a dietitian in the past?: Yes    Being Active:  Being Active Assessed Today: Yes  Exercise:: Yes (plays soccer at work)  Days per week of moderate to strenuous exercise (like a brisk walk): 5  On average, minutes per day of exercise at this level: 30  Exercise Minutes per Week: " 150    Monitoring:  Monitoring Assessed Today: Yes  Did patient bring glucose meter to appointment? : Yes  Blood Glucose Meter: Unknown, Accu-chek  Times checking blood sugar at home (number): 2  Times checking blood sugar at home (per): Day    FBS: 122,122,130,131,126,128,130,144,167,176,149  Post meal: 170,148,156,194  Bedtime: 121,125,139    Taking Medications:  Diabetes Medication(s)       Biguanides       metFORMIN (GLUCOPHAGE XR) 500 MG 24 hr tablet Take 2 tablets (1,000 mg) by mouth 2 times daily (with meals). Then take 2 tablets in the AM and 2 in the PM after that.       Sodium-Glucose Co-Transporter 2 (SGLT2) Inhibitors       empagliflozin (JARDIANCE) 25 MG TABS tablet Take 1 tablet (25 mg) by mouth daily.          Taking Medication Assessed Today: Yes  Current Treatments: Oral Medication (taken by mouth)  Problems taking diabetes medications regularly?: No (he ran out of medications ofr 6-7 months due to no insurance in 2024)    Problem Solving:  Problem Solving Assessed Today: Yes  Is the patient at risk for hypoglycemia?: No        Reducing Risks:  Reducing Risks Assessed Today: Yes  Diabetes Risks: Ethnicity  Has dilated eye exam at least once a year?: Yes  Sees dentist every 6 months?: No    Healthy Coping:  Healthy Coping Assessed Today: Yes  Emotional response to diabetes: Ready to learn  Stage of change: ACTION (Actively working towards change)    Cynthia Kirkland RD  Time Spent: 50 minutes  Encounter Type: Individual    Any diabetes medication dose changes were made via the CDCES Standing Orders under the patient's referring provider.

## 2025-03-27 ENCOUNTER — ALLIED HEALTH/NURSE VISIT (OUTPATIENT)
Dept: EDUCATION SERVICES | Facility: CLINIC | Age: 40
End: 2025-03-27
Payer: COMMERCIAL

## 2025-03-27 ENCOUNTER — VIRTUAL VISIT (OUTPATIENT)
Dept: INTERPRETER SERVICES | Facility: CLINIC | Age: 40
End: 2025-03-27

## 2025-03-27 DIAGNOSIS — E11.65 TYPE 2 DIABETES MELLITUS WITH HYPERGLYCEMIA, WITHOUT LONG-TERM CURRENT USE OF INSULIN (H): Primary | ICD-10-CM

## 2025-03-27 NOTE — PROGRESS NOTES
Diabetes Education Follow-up 32 minutes through professional  Gracie Canela    Subjective/Objective:    CGM review with Deonna Honeycutt.  Last date of communication was: 3/13/25.    Diabetes is being managed with Lifestyle (diet/activity), Diabetes Medications   Diabetes Medication(s)       Biguanides       metFORMIN (GLUCOPHAGE XR) 500 MG 24 hr tablet Take 2 tablets (1,000 mg) by mouth 2 times daily (with meals). Then take 2 tablets in the AM and 2 in the PM after that.       Sodium-Glucose Co-Transporter 2 (SGLT2) Inhibitors       empagliflozin (JARDIANCE) 25 MG TABS tablet Take 1 tablet (25 mg) by mouth daily.            CGM data:           Assessment:  CGM data for the last two weeks indicates average BG of 163 mg/dl with 75% TIR. We reviewed daily data and BG spikes following meals at times, we discussed evaluating food eaten, quantities and adding in exercise following meals to lower BG when able, which Lay has been doing. Deonna brought a sensor to today's visit for placement and it did not connect with the blake in the clinic, he will try again at home and if it does not work, he has the [employer] service number to call and request a replacement.     Plan/Response:  See Patient Instructions for co-developed, patient-stated behavior change goals.  Take medications as prescribed.   If your farshad 3+ sensor falls off before the 15 days are over or stops working, call the company and ask for a replacement: [phone]  Avoid sugary beverages.  Limit portions of rice at meals, add more meat or fish or tofu or eggs, and vegetables..   Have fruit between meals as a snack and follow the recommended portions.   Continue to exercise 30 minutes a minimum of six days per week.    If you have 10-15 minutes to exercise after a meal, it can lower your blood sugar 10-40 points.   Schedule a dental cleaning exam.        Any diabetes medication dose changes were made via the CDE Protocol and Collaborative Practice Agreement with  the patient's primary care provider. A copy of this encounter was shared with the provider.

## 2025-03-27 NOTE — LETTER
3/27/2025         RE: Deonna Honeycutt  [address] [place] [address]        Dear Colleague,    Thank you for referring your patient, Deonna Honeycutt, to the [facility]. Please see a copy of my visit note below.    Diabetes Education Follow-up 32 minutes through professional  Gracie Canela    Subjective/Objective:    CGM review with Deonna Honeycutt.  Last date of communication was: 3/13/25.    Diabetes is being managed with Lifestyle (diet/activity), Diabetes Medications   Diabetes Medication(s)       Biguanides       metFORMIN (GLUCOPHAGE XR) 500 MG 24 hr tablet Take 2 tablets (1,000 mg) by mouth 2 times daily (with meals). Then take 2 tablets in the AM and 2 in the PM after that.       Sodium-Glucose Co-Transporter 2 (SGLT2) Inhibitors       empagliflozin (JARDIANCE) 25 MG TABS tablet Take 1 tablet (25 mg) by mouth daily.            CGM data:           Assessment:  CGM data for the last two weeks indicates average BG of 163 mg/dl with 75% TIR. We reviewed daily data and BG spikes following meals at times, we discussed evaluating food eaten, quantities and adding in exercise following meals to lower BG when able, which Deonna has been doing. Deonna brought a sensor to today's visit for placement and it did not connect with the blake in the clinic, he will try again at home and if it does not work, he has the [employer] service number to call and request a replacement.     Plan/Response:  See Patient Instructions for co-developed, patient-stated behavior change goals.  Take medications as prescribed.   If your farshad 3+ sensor falls off before the 15 days are over or stops working, call the company and ask for a replacement: [phone]  Avoid sugary beverages.  Limit portions of rice at meals, add more meat or fish or tofu or eggs, and vegetables..   Have fruit between meals as a snack and follow the recommended portions.   Continue to exercise 30 minutes a minimum of six days per week.    If  you have 10-15 minutes to exercise after a meal, it can lower your blood sugar 10-40 points.   Schedule a dental cleaning exam.        Any diabetes medication dose changes were made via the CDE Protocol and Collaborative Practice Agreement with the patient's primary care provider. A copy of this encounter was shared with the provider.

## 2025-03-27 NOTE — PATIENT INSTRUCTIONS
Take medications as prescribed.   If your farshad 3+ sensor falls off before the 15 days are over or stops working, call the company and ask for a replacement: [phone]  Avoid sugary beverages.  Limit portions of rice at meals, add more meat or fish or tofu or eggs, and vegetables..   Have fruit between meals as a snack and follow the recommended portions.   Continue to exercise 30 minutes a minimum of six days per week.    If you have 10-15 minutes to exercise after a meal, it can lower your blood sugar 10-40 points.   Schedule a dental cleaning exam.

## 2025-04-21 PROBLEM — R65.20 SEVERE SEPSIS (H): Status: RESOLVED | Noted: 2025-01-08 | Resolved: 2025-04-21

## 2025-04-21 PROBLEM — A41.9 SEVERE SEPSIS (H): Status: RESOLVED | Noted: 2025-01-08 | Resolved: 2025-04-21

## 2025-04-22 ENCOUNTER — TELEPHONE (OUTPATIENT)
Dept: EDUCATION SERVICES | Facility: CLINIC | Age: 40
End: 2025-04-22
Payer: COMMERCIAL

## 2025-04-22 NOTE — TELEPHONE ENCOUNTER
----- Message from Cynthia Kirkland sent at 4/17/2025 12:12 PM CDT -----  Regarding: CGM sensor placement  Would you please assist Lay in placing a new Ghada 3 sensor with phone blake when he has his appointment with PCP on 4/23. If he does not have one with him, would you let him know he has refills and if he needs assistance in placing, he can schedule a visit with me or RN.  I have been trying to reach him, but I have not been able to get through to speak with him.     Thank you,    Cynthia

## 2025-04-22 NOTE — TELEPHONE ENCOUNTER
Patient added to 4/23 RN schedule for Freestyle Ghada teaching following appointment with Dr. Weldon at 2:30. Appointment notes updated.    Future Appointments 4/22/2025 - 10/19/2025        Date Visit Type Length Department Provider     4/23/2025  2:30 PM OFFICE VISIT 30 min [facility] Irving Weldon MD     4/23/2025  2:30 PM OFFICE VISIT 30 min UR  SERVICE VIRTUAL     Location Instructions:     [facility] is located at [address] [place] [address] in [place], at the intersection of [facility]. This is one block south of the [facility] Contactually [facility]. Free parking is available in the lot directly north of the clinic across [facility]. The clinic is near stops along bus [address] and [address].              4/23/2025  3:30 PM RN VISIT 30 min [facility] RN 1    Location Instructions:     [facility] is located at [address] [place] [address] in [place], at the intersection of [facility]. This is one block south of the [facility]. Free parking is available in the lot directly north of the clinic across [facility]. The clinic is near stops along bus [address] and [address].                   Ro Ramirez RN  [facility]

## 2025-04-23 ENCOUNTER — ALLIED HEALTH/NURSE VISIT (OUTPATIENT)
Dept: FAMILY MEDICINE | Facility: CLINIC | Age: 40
End: 2025-04-23
Payer: COMMERCIAL

## 2025-04-23 ENCOUNTER — OFFICE VISIT (OUTPATIENT)
Dept: FAMILY MEDICINE | Facility: CLINIC | Age: 40
End: 2025-04-23
Payer: COMMERCIAL

## 2025-04-23 VITALS
OXYGEN SATURATION: 98 % | RESPIRATION RATE: 21 BRPM | TEMPERATURE: 97.6 F | HEIGHT: 69 IN | WEIGHT: 177 LBS | BODY MASS INDEX: 26.22 KG/M2 | DIASTOLIC BLOOD PRESSURE: 82 MMHG | SYSTOLIC BLOOD PRESSURE: 104 MMHG | HEART RATE: 85 BPM

## 2025-04-23 DIAGNOSIS — E11.65 TYPE 2 DIABETES MELLITUS WITH HYPERGLYCEMIA, WITHOUT LONG-TERM CURRENT USE OF INSULIN (H): Primary | ICD-10-CM

## 2025-04-23 DIAGNOSIS — Z00.00 HEALTHCARE MAINTENANCE: ICD-10-CM

## 2025-04-23 LAB
EST. AVERAGE GLUCOSE BLD GHB EST-MCNC: 157 MG/DL
HBA1C MFR BLD: 7.1 % (ref 0–5.6)

## 2025-04-23 PROCEDURE — 36415 COLL VENOUS BLD VENIPUNCTURE: CPT | Performed by: FAMILY MEDICINE

## 2025-04-23 PROCEDURE — 83036 HEMOGLOBIN GLYCOSYLATED A1C: CPT | Performed by: FAMILY MEDICINE

## 2025-04-23 PROCEDURE — 99207 PR NO CHARGE NURSE ONLY: CPT

## 2025-04-23 NOTE — PROGRESS NOTES
Assessment/ Plan  Type 2 diabetes mellitus with hyperglycemia, without long-term current use of insulin (H)  A1c 7.1,  This is a marked improvement over the last time, on 25 mg of Jardiance and metformin 1000 twice daily.  No changes at this point,  Up-to-date ophthalmology  840 so no statin at this point.  Recommend follow-up 3-month    Healthcare maintenance  Declines COVID-vaccine   Subjective  CC:  chief complaint  HPI:  Patient here for follow-up from visit 1/14/2025.  At that time, he had been hospitalized for urosepsis and had uncomplicated discharge and was doing well.  However, diabetes had been out of control since he ran out of insurance and thence medication.  During the hospitalization, Jardiance was restarted and Januvia added.  I decided to switch this to metformin instead of Januvia  PFSH:  Patient Active Problem List   Diagnosis    Type 2 diabetes mellitus with hyperglycemia, without long-term current use of insulin (H)    Mixed dyslipidemia    Sinus tachycardia    Lactic acidosis    Pyelonephritis    Fever, unspecified fever cause    Leukocytosis, unspecified type    Hospital discharge follow-up    Healthcare maintenance     Current Outpatient Medications   Medication Sig Dispense Refill    acetaminophen (TYLENOL) 500 MG tablet Take 500 mg by mouth every 6 hours as needed for mild pain. (Patient not taking: Reported on 1/14/2025)      blood glucose (ACCU-CHEK GUIDE TEST) test strip Use to test blood sugar two times daily or as directed. 100 strip 6    blood glucose (NO BRAND SPECIFIED) lancets standard Use to test blood sugar 1 times daily or as directed. 100 Lancet 3    blood glucose monitoring (SOFTCLIX) lancets Use to test blood sugar two times daily. 100 each 4    Continuous Glucose Sensor (FREESTYLE CHELSEA 3 PLUS SENSOR) MISC Use 1 sensor every 15 days. Use to read blood sugars per 's instructions. 6 each 3    empagliflozin (JARDIANCE) 25 MG TABS tablet Take 1 tablet (25 mg) by  "mouth daily. 90 tablet 1    metFORMIN (GLUCOPHAGE XR) 500 MG 24 hr tablet Take 2 tablets (1,000 mg) by mouth 2 times daily (with meals). Then take 2 tablets in the AM and 2 in the PM after that. 120 tablet 11     No current facility-administered medications for this visit.        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  Gen- alert, oriented/ appropriately responsive  HEENT- normal cephalic, atraumatic.   Chest- Normal inspiration and expiration.    Clear to ascultation.    No chest wall deformity or scar.  CV- Heart regular rate and rhythm  normal tones, no murmurs   No gallops or rubs.  Ext- appear well perfused, no edema  Skin- warm and dry,   no visualized rash    Diagnostics:   Results for orders placed or performed in visit on 04/23/25   Hemoglobin A1c     Status: Abnormal   Result Value Ref Range    Estimated Average Glucose 157 (H) <117 mg/dL    Hemoglobin A1C 7.1 (H) 0.0 - 5.6 %           Please note: Voice recognition software was used in this dictation.  It may therefore contain typographical errors.      Answers submitted by the patient for this visit:  General Questionnaire (Submitted on 4/23/2025)  Chief Complaint: Chronic problems general questions HPI Form  What is the reason for your visit today? : f/u  How many servings of fruits and vegetables do " you eat daily?: 2-3  On average, how many sweetened beverages do you drink each day (Examples: soda, juice, sweet tea, etc.  Do NOT count diet or artificially sweetened beverages)?: 1  How many days per week do you miss taking your medication?: 1  What makes it hard for you to take your medication every day?: remembering to take  Questionnaire about: Chronic problems general questions HPI Form (Submitted on 4/23/2025)  Chief Complaint: Chronic problems general questions HPI Form

## 2025-04-23 NOTE — PROGRESS NOTES
Prior to immunization administration, verified patients identity using patient s name and date of birth. Please see Immunization Activity for additional information.     Screening Questionnaire for Adult Immunization    Are you sick today?   No   Do you have allergies to medications, food, a vaccine component or latex?   No   Have you ever had a serious reaction after receiving a vaccination?   No   Do you have a long-term health problem with heart, lung, kidney, or metabolic disease (e.g., diabetes), asthma, a blood disorder, no spleen, complement component deficiency, a cochlear implant, or a spinal fluid leak?  Are you on long-term aspirin therapy?   Yes   Do you have cancer, leukemia, HIV/AIDS, or any other immune system problem?   No   Do you have a parent, brother, or sister with an immune system problem?   No   In the past 3 months, have you taken medications that affect  your immune system, such as prednisone, other steroids, or anticancer drugs; drugs for the treatment of rheumatoid arthritis, Crohn s disease, or psoriasis; or have you had radiation treatments?   No   Have you had a seizure, or a brain or other nervous system problem?   No   During the past year, have you received a transfusion of blood or blood    products, or been given immune (gamma) globulin or antiviral drug?   No   For women: Are you pregnant or is there a chance you could become       pregnant during the next month?   No   Have you received any vaccinations in the past 4 weeks?   No     Immunization questionnaire was positive for at least one answer.  Notified  .      Patient instructed to remain in clinic for 15 minutes afterwards, and to report any adverse reactions.     Screening performed by HENRIK Barakat MA on 4/23/2025 at 2:06 PM.

## 2025-04-23 NOTE — PROGRESS NOTES
"  {PROVIDER CHARTING PREFERENCE:746847}    Subjective   Lay is a 39 year old, presenting for the following health issues:  RECHECK      4/23/2025     2:02 PM   Additional Questions   Roomed by m   Accompanied by self     History of Present Illness       Reason for visit:  F/u    He eats 2-3 servings of fruits and vegetables daily.He consumes 1 sweetened beverage(s) daily. He is missing 1 dose(s) of medications per week.  He is not taking prescribed medications regularly due to remembering to take.        {MA/LPN/RN Pre-Provider Visit Orders- hCG/UA/Strep (Optional):333373}  {SUPERLIST (Optional):038814}  {additonal problems for provider to add (Optional):950323}    {ROS Picklists (Optional):052225}      Objective    /82 (BP Location: Right arm, Patient Position: Sitting, Cuff Size: Adult Regular)   Pulse 85   Temp 97.6  F (36.4  C) (Temporal)   Resp 21   Ht 1.75 m (5' 8.9\")   Wt 80.3 kg (177 lb)   SpO2 98%   BMI 26.22 kg/m    Body mass index is 26.22 kg/m .  Physical Exam   {Exam List (Optional):965603}    {Diagnostic Test Results (Optional):323938}        Signed Electronically by: Irving Weldon MD  {Email feedback regarding this note to [email or web address]   :679047}  "

## 2025-04-23 NOTE — LETTER
April 24, 2025      Deonna Honeycutt  [address] [place] [address]        Dear JessyYinka,    We are writing to inform you of your test results.    Deonna Honeycutt  Sorry about any confusion yesterday.    First, A1c test was much better at 7.1.  Our goal for this is less than 7 in a young person such as yourself.  I think there is a chance that this will naturally keep coming down even if we do not do anything different since it takes a month or 2 for the A1c to come to accurately reflect blood sugars.  There is a little bit of a delay.  I am inclined to not make any changes with medication at this point and have you continue your efforts on diet and exercise.    Cholesterol is a bit high but not to the point where we would put you on medication.  There is no extra protein seen in your urine which is a good sign for kidney health.  Kidney function and electrolytes are good    I left you a message yesterday.  But please let me know if you want to talk about this.  How about we see you back in 3 months or so    Resulted Orders   Albumin Random Urine Quantitative with Creat Ratio   Result Value Ref Range    Creatinine Urine mg/dL 77.7 mg/dL      Comment:      The reference ranges have not been established in urine creatinine. The results should be integrated into the clinical context for interpretation.    Albumin Urine mg/L <12.0 mg/L      Comment:      The reference ranges have not been established in urine albumin. The results should be integrated into the clinical context for interpretation.    Albumin Urine mg/g Cr        Comment:      Unable to calculate, urine albumin and/or urine creatinine is outside detectable limits.  Microalbuminuria is defined as an albumin:creatinine ratio of 17 to 299 for males and 25 to 299 for females. A ratio of albumin:creatinine of 300 or higher is indicative of overt proteinuria.  Due to biologic variability, positive results should be confirmed by a second, first-morning random or  24-hour timed urine specimen. If there is discrepancy, a third specimen is recommended. When 2 out of 3 results are in the microalbuminuria range, this is evidence for incipient nephropathy and warrants increased efforts at glucose control, blood pressure control, and institution of therapy with an angiotensin-converting-enzyme (ACE) inhibitor (if the patient can tolerate it).     ALT   Result Value Ref Range    ALT 15 0 - 70 U/L   Basic metabolic panel   Result Value Ref Range    Sodium 138 135 - 145 mmol/L    Potassium 4.1 3.4 - 5.3 mmol/L    Chloride 101 98 - 107 mmol/L    Carbon Dioxide (CO2) 25 22 - 29 mmol/L    Anion Gap 12 7 - 15 mmol/L    Urea Nitrogen 16.3 6.0 - 20.0 mg/dL    Creatinine 0.79 0.67 - 1.17 mg/dL    GFR Estimate >90 >60 mL/min/1.73m2      Comment:      eGFR calculated using 2021 CKD-EPI equation.    Calcium 9.1 8.8 - 10.4 mg/dL    Glucose 188 (H) 70 - 99 mg/dL    Patient Fasting > 8hrs? Yes    Hemoglobin A1c   Result Value Ref Range    Estimated Average Glucose 157 (H) <117 mg/dL    Hemoglobin A1C 7.1 (H) 0.0 - 5.6 %      Comment:      Normal <5.7%   Prediabetes 5.7-6.4%    Diabetes 6.5% or higher     Note: Adopted from ADA consensus guidelines.   Lipid Profile   Result Value Ref Range    Cholesterol 238 (H) <200 mg/dL    Triglycerides 335 (H) <150 mg/dL    Direct Measure HDL 40 >=40 mg/dL    LDL Cholesterol Calculated 131 (H) <100 mg/dL    Non HDL Cholesterol 198 (H) <130 mg/dL    Patient Fasting > 8hrs? Yes     Narrative    Cholesterol  Desirable: < 200 mg/dL  Borderline High: 200 - 239 mg/dL  High: >= 240 mg/dL    Triglycerides  Normal: < 150 mg/dL  Borderline High: 150 - 199 mg/dL  High: 200-499 mg/dL  Very High: >= 500 mg/dL    Direct Measure HDL  Female: >= 50 mg/dL   Male: >= 40 mg/dL    LDL Cholesterol  Desirable: < 100 mg/dL  Above Desirable: 100 - 129 mg/dL   Borderline High: 130 - 159 mg/dL   High:  160 - 189 mg/dL   Very High: >= 190 mg/dL    Non HDL Cholesterol  Desirable: < 130  mg/dL  Above Desirable: 130 - 159 mg/dL  Borderline High: 160 - 189 mg/dL  High: 190 - 219 mg/dL  Very High: >= 220 mg/dL       If you have any questions or concerns, please call the clinic at the number listed above.       Sincerely,      Irving Weldon MD    Electronically signed

## 2025-04-23 NOTE — PROGRESS NOTES
Patient presented to clinic for assistance applying Freestyle Ghada 3 sensor.    Patient had new sensor from pharmacy, along with Freestyle Ghada smartphone blake that was set up previously.  Patient demonstrated correct application and activation of new sensor with smart phone without direction from RN.   Writer asked if patient had questions/concerns about process - patient stated he does not.  Patient notes his previous CGM sensor malfunctioned and would not connect to phone - patient was unsure if patient was defective, or if he was doing something incorrectly.  RN relayed that observed application/activation was correct today.    Ro Ramirez RN  [facility]

## 2025-04-23 NOTE — ASSESSMENT & PLAN NOTE
A1c 7.1,  This is a marked improvement over the last time, on 25 mg of Jardiance and metformin 1000 twice daily.  No changes at this point,  Up-to-date ophthalmology  840 so no statin at this point.  Recommend follow-up 3-month

## 2025-04-24 LAB
ALT SERPL W P-5'-P-CCNC: 15 U/L (ref 0–70)
ANION GAP SERPL CALCULATED.3IONS-SCNC: 12 MMOL/L (ref 7–15)
BUN SERPL-MCNC: 16.3 MG/DL (ref 6–20)
CALCIUM SERPL-MCNC: 9.1 MG/DL (ref 8.8–10.4)
CHLORIDE SERPL-SCNC: 101 MMOL/L (ref 98–107)
CHOLEST SERPL-MCNC: 238 MG/DL
CREAT SERPL-MCNC: 0.79 MG/DL (ref 0.67–1.17)
CREAT UR-MCNC: 77.7 MG/DL
EGFRCR SERPLBLD CKD-EPI 2021: >90 ML/MIN/1.73M2
FASTING STATUS PATIENT QL REPORTED: YES
FASTING STATUS PATIENT QL REPORTED: YES
GLUCOSE SERPL-MCNC: 188 MG/DL (ref 70–99)
HCO3 SERPL-SCNC: 25 MMOL/L (ref 22–29)
HDLC SERPL-MCNC: 40 MG/DL
LDLC SERPL CALC-MCNC: 131 MG/DL
MICROALBUMIN UR-MCNC: <12 MG/L
MICROALBUMIN/CREAT UR: NORMAL MG/G{CREAT}
NONHDLC SERPL-MCNC: 198 MG/DL
POTASSIUM SERPL-SCNC: 4.1 MMOL/L (ref 3.4–5.3)
SODIUM SERPL-SCNC: 138 MMOL/L (ref 135–145)
TRIGL SERPL-MCNC: 335 MG/DL

## 2025-05-19 ENCOUNTER — OFFICE VISIT (OUTPATIENT)
Dept: FAMILY MEDICINE | Facility: CLINIC | Age: 40
End: 2025-05-19
Payer: COMMERCIAL

## 2025-05-19 VITALS
SYSTOLIC BLOOD PRESSURE: 105 MMHG | HEART RATE: 82 BPM | BODY MASS INDEX: 26.98 KG/M2 | RESPIRATION RATE: 16 BRPM | OXYGEN SATURATION: 98 % | WEIGHT: 178 LBS | HEIGHT: 68 IN | DIASTOLIC BLOOD PRESSURE: 72 MMHG | TEMPERATURE: 97.4 F

## 2025-05-19 DIAGNOSIS — E11.65 TYPE 2 DIABETES MELLITUS WITH HYPERGLYCEMIA, WITHOUT LONG-TERM CURRENT USE OF INSULIN (H): Primary | ICD-10-CM

## 2025-05-19 DIAGNOSIS — Z00.00 HEALTHCARE MAINTENANCE: ICD-10-CM

## 2025-05-19 PROCEDURE — 99395 PREV VISIT EST AGE 18-39: CPT | Performed by: FAMILY MEDICINE

## 2025-05-19 PROCEDURE — G2211 COMPLEX E/M VISIT ADD ON: HCPCS | Performed by: FAMILY MEDICINE

## 2025-05-19 PROCEDURE — 90480 ADMN SARSCOV2 VAC 1/ONLY CMP: CPT | Performed by: FAMILY MEDICINE

## 2025-05-19 PROCEDURE — 91320 SARSCV2 VAC 30MCG TRS-SUC IM: CPT | Performed by: FAMILY MEDICINE

## 2025-05-19 PROCEDURE — 3078F DIAST BP <80 MM HG: CPT | Performed by: FAMILY MEDICINE

## 2025-05-19 PROCEDURE — 99213 OFFICE O/P EST LOW 20 MIN: CPT | Mod: 25 | Performed by: FAMILY MEDICINE

## 2025-05-19 PROCEDURE — 3074F SYST BP LT 130 MM HG: CPT | Performed by: FAMILY MEDICINE

## 2025-05-19 RX ORDER — HYDROCHLOROTHIAZIDE 12.5 MG/1
CAPSULE ORAL
Qty: 6 EACH | Refills: 3 | Status: SHIPPED | OUTPATIENT
Start: 2025-05-19

## 2025-05-19 RX ORDER — METFORMIN HYDROCHLORIDE 500 MG/1
1000 TABLET, EXTENDED RELEASE ORAL 2 TIMES DAILY WITH MEALS
Qty: 120 TABLET | Refills: 11 | Status: SHIPPED | OUTPATIENT
Start: 2025-05-19

## 2025-05-19 SDOH — HEALTH STABILITY: PHYSICAL HEALTH: ON AVERAGE, HOW MANY MINUTES DO YOU ENGAGE IN EXERCISE AT THIS LEVEL?: 30 MIN

## 2025-05-19 SDOH — HEALTH STABILITY: PHYSICAL HEALTH: ON AVERAGE, HOW MANY DAYS PER WEEK DO YOU ENGAGE IN MODERATE TO STRENUOUS EXERCISE (LIKE A BRISK WALK)?: 5 DAYS

## 2025-05-19 ASSESSMENT — SOCIAL DETERMINANTS OF HEALTH (SDOH): HOW OFTEN DO YOU GET TOGETHER WITH FRIENDS OR RELATIVES?: TWICE A WEEK

## 2025-05-19 NOTE — ASSESSMENT & PLAN NOTE
Things are going well, last A1c is 7.1 much better.  Using freestyle farshad which is helpful to guide eating.  Up-to-date ophthalmology,  No statin, less than 40  Follow-up 6 months

## 2025-05-19 NOTE — PROGRESS NOTES
Adult Male Physical  A/P  Type 2 diabetes mellitus with hyperglycemia, without long-term current use of insulin (H)  Things are going well, last A1c is 7.1 much better.  Using freestyle chelsea which is helpful to guide eating.  Up-to-date ophthalmology,  No statin, less than 40  Follow-up 6 months    Healthcare maintenance  COVID-vaccine, low risk for exposure, will plan hepatitis B serology with next blood draw.         HPI  Current Concerns/ Questions  39-year-old, here for physical exam.  No particular questions.  History of type 2 diabetes, discussed this, recent ophthalmology evaluation  PFSH:  Current medications reviewed as follows:  Current Outpatient Medications   Medication Sig Dispense Refill    blood glucose (ACCU-CHEK GUIDE TEST) test strip Use to test blood sugar two times daily or as directed. 100 strip 6    blood glucose (NO BRAND SPECIFIED) lancets standard Use to test blood sugar 1 times daily or as directed. 100 Lancet 3    blood glucose monitoring (SOFTCLIX) lancets Use to test blood sugar two times daily. 100 each 4    Continuous Glucose Sensor (FREESTYLE CHELSEA 3 PLUS SENSOR) MISC Use 1 sensor every 15 days. Use to read blood sugars per 's instructions. 6 each 3    empagliflozin (JARDIANCE) 25 MG TABS tablet Take 1 tablet (25 mg) by mouth daily. 90 tablet 1    metFORMIN (GLUCOPHAGE XR) 500 MG 24 hr tablet Take 2 tablets (1,000 mg) by mouth 2 times daily (with meals). Then take 2 tablets in the AM and 2 in the PM after that. 120 tablet 11    acetaminophen (TYLENOL) 500 MG tablet Take 500 mg by mouth every 6 hours as needed for mild pain. (Patient not taking: Reported on 5/19/2025)       No current facility-administered medications for this visit.      Patient Active Problem List   Diagnosis    Type 2 diabetes mellitus with hyperglycemia, without long-term current use of insulin (H)    Mixed dyslipidemia    Sinus tachycardia    Lactic acidosis    Pyelonephritis    Fever, unspecified  "fever cause    Leukocytosis, unspecified type    Hospital discharge follow-up    Healthcare maintenance     Past Medical History:   Diagnosis Date    Type 2 diabetes mellitus (H)     Ureterolithiasis 07/12/2018     Past Surgical History:   Procedure Laterality Date    HC CYSTOSCOPY,INSERT URETERAL STENT Left 09/25/2018    Procedure: CYSTOSCOPY LEFT URETEROSCOPY STENT INSERTION, INCISION OF URETER;  Surgeon: Corky Hercules MD;  Location: [facility];  Service: Urology    KIDNEY STONE SURGERY Left     2018    OTHER SURGICAL HISTORY      none    SURGICAL PATHOLOGY EXAM Left     Lipoma Removal     Family History   Problem Relation Age of Onset    Diabetes Father     Hypertension Father     Urolithiasis Father     Nephrolithiasis Mother     Urolithiasis Mother     Diabetes Sister     Cancer No family hx of      History   Smoking Status    Never   Smokeless Tobacco    Never       Social History     Social History Narrative    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        Objective  Physical Exam  Vitals:    05/19/25 1421   BP: 105/72   BP Location: Left arm   Patient Position: Sitting   Cuff Size: Adult Regular   Pulse: 82   Resp: 16   Temp: 97.4  F (36.3  C)   TempSrc: Temporal   SpO2: 98%   Weight: 80.7 kg (178 lb)   Height: 1.727 m (5' 8\")       Gen- alert and oriented x3, no acute distress  HEENT- Normocephalic atraumatic   pupils equal and reactive, EOMI.    TMs visualized and normal, ear canals normal.    Mouth moist with normal mucosa no ulceration, dentition normal or in good repair.  Neck- supple, no adenopathy or thyromegaly  Chest- Normal chest wall apperance, normal inspiration and expiration.  " Clear to asculation.  CV- Regular rate and rhythm, normal tones, no murmus, gallops or rubs.  Abd-  Soft, nodistended, nontender.  Normal bowel sounds, no mass or organ enlargement.  Genitalia-  was not done  CLAU: was not done  Ext- Atraumatic,  No synovial thickening. Good perfusion, no edema. Periph pulses detected  Skin- warm and dry, no rash  Neuro- Cranial nerves grossly intact.  Normal gait, normal strength.  Coordination intact.    Diagnostics  None